# Patient Record
Sex: FEMALE | Race: WHITE | Employment: OTHER | URBAN - NONMETROPOLITAN AREA
[De-identification: names, ages, dates, MRNs, and addresses within clinical notes are randomized per-mention and may not be internally consistent; named-entity substitution may affect disease eponyms.]

---

## 2017-06-29 ENCOUNTER — HOSPITAL ENCOUNTER (OUTPATIENT)
Dept: WOMENS IMAGING | Age: 62
Discharge: HOME | End: 2017-06-29
Payer: COMMERCIAL

## 2017-06-29 DIAGNOSIS — Z12.31 SCREENING MAMMOGRAM, ENCOUNTER FOR: ICD-10-CM

## 2017-06-29 PROCEDURE — 77063 BREAST TOMOSYNTHESIS BI: CPT

## 2017-07-25 LAB
ANION GAP SERPL CALCULATED.3IONS-SCNC: 15 MMOL/L (ref 7–19)
BUN BLDV-MCNC: 22 MG/DL (ref 8–23)
CALCIUM SERPL-MCNC: 9.9 MG/DL (ref 8.8–10.2)
CHLORIDE BLD-SCNC: 100 MMOL/L (ref 98–111)
CO2: 28 MMOL/L (ref 22–29)
CREAT SERPL-MCNC: 0.7 MG/DL (ref 0.5–0.9)
GFR NON-AFRICAN AMERICAN: >60
GLUCOSE BLD-MCNC: 84 MG/DL (ref 74–109)
POTASSIUM SERPL-SCNC: 4.2 MMOL/L (ref 3.5–5)
SODIUM BLD-SCNC: 143 MMOL/L (ref 136–145)

## 2017-07-26 LAB
HAV IGM SER IA-ACNC: NORMAL
HEPATITIS B CORE IGM ANTIBODY: NORMAL
HEPATITIS B SURFACE ANTIGEN INTERPRETATION: NORMAL
HEPATITIS C ANTIBODY INTERPRETATION: NORMAL

## 2017-09-12 ENCOUNTER — OFFICE VISIT (OUTPATIENT)
Dept: FAMILY MEDICINE CLINIC | Age: 62
End: 2017-09-12
Payer: COMMERCIAL

## 2017-09-12 VITALS
OXYGEN SATURATION: 99 % | DIASTOLIC BLOOD PRESSURE: 62 MMHG | BODY MASS INDEX: 28.56 KG/M2 | WEIGHT: 182 LBS | HEART RATE: 82 BPM | SYSTOLIC BLOOD PRESSURE: 100 MMHG | HEIGHT: 67 IN | RESPIRATION RATE: 18 BRPM

## 2017-09-12 DIAGNOSIS — E78.01 FAMILIAL HYPERCHOLESTEROLEMIA: ICD-10-CM

## 2017-09-12 DIAGNOSIS — E55.9 VITAMIN D DEFICIENCY: Primary | ICD-10-CM

## 2017-09-12 DIAGNOSIS — F41.8 DEPRESSION WITH ANXIETY: ICD-10-CM

## 2017-09-12 DIAGNOSIS — I10 ESSENTIAL (PRIMARY) HYPERTENSION: ICD-10-CM

## 2017-09-12 PROCEDURE — 99213 OFFICE O/P EST LOW 20 MIN: CPT | Performed by: FAMILY MEDICINE

## 2017-09-12 RX ORDER — MONTELUKAST SODIUM 10 MG/1
10 TABLET ORAL NIGHTLY
Qty: 90 TABLET | Refills: 2 | Status: SHIPPED | OUTPATIENT
Start: 2017-09-12 | End: 2018-09-18 | Stop reason: SDUPTHER

## 2017-09-12 RX ORDER — LISINOPRIL 10 MG/1
10 TABLET ORAL DAILY
Qty: 90 TABLET | Refills: 2 | Status: SHIPPED | OUTPATIENT
Start: 2017-09-12 | End: 2018-07-05 | Stop reason: SDUPTHER

## 2017-09-12 RX ORDER — VENLAFAXINE HYDROCHLORIDE 225 MG/1
225 TABLET, EXTENDED RELEASE ORAL
Qty: 90 TABLET | Refills: 2 | Status: SHIPPED | OUTPATIENT
Start: 2017-09-12 | End: 2018-06-07 | Stop reason: SDUPTHER

## 2017-09-12 ASSESSMENT — PATIENT HEALTH QUESTIONNAIRE - PHQ9
SUM OF ALL RESPONSES TO PHQ QUESTIONS 1-9: 2
SUM OF ALL RESPONSES TO PHQ9 QUESTIONS 1 & 2: 2
2. FEELING DOWN, DEPRESSED OR HOPELESS: 1
1. LITTLE INTEREST OR PLEASURE IN DOING THINGS: 1

## 2017-09-17 PROBLEM — I10 ESSENTIAL (PRIMARY) HYPERTENSION: Status: ACTIVE | Noted: 2017-09-17

## 2017-09-17 PROBLEM — M54.32 BILATERAL SCIATICA: Status: ACTIVE | Noted: 2017-09-17

## 2017-09-17 PROBLEM — F41.8 DEPRESSION WITH ANXIETY: Status: ACTIVE | Noted: 2017-09-17

## 2017-09-17 PROBLEM — M54.31 BILATERAL SCIATICA: Status: ACTIVE | Noted: 2017-09-17

## 2017-09-17 PROBLEM — F41.9 ANXIETY: Status: ACTIVE | Noted: 2017-09-17

## 2017-09-17 PROBLEM — J30.9 ALLERGIC RHINITIS: Status: ACTIVE | Noted: 2017-09-17

## 2017-09-17 PROBLEM — M19.90 ARTHRITIS: Status: ACTIVE | Noted: 2017-09-17

## 2017-09-17 ASSESSMENT — ENCOUNTER SYMPTOMS
CHEST TIGHTNESS: 0
BLURRED VISION: 0
NAUSEA: 0
CONSTIPATION: 0
DIARRHEA: 0
COUGH: 0
ANAL BLEEDING: 0
ABDOMINAL PAIN: 0
SHORTNESS OF BREATH: 0

## 2017-10-04 ENCOUNTER — NURSE ONLY (OUTPATIENT)
Dept: FAMILY MEDICINE CLINIC | Age: 62
End: 2017-10-04
Payer: COMMERCIAL

## 2017-10-04 DIAGNOSIS — Z23 NEED FOR INFLUENZA VACCINATION: Primary | ICD-10-CM

## 2017-10-05 PROCEDURE — 90688 IIV4 VACCINE SPLT 0.5 ML IM: CPT | Performed by: FAMILY MEDICINE

## 2017-10-05 PROCEDURE — 90471 IMMUNIZATION ADMIN: CPT | Performed by: FAMILY MEDICINE

## 2017-11-07 ENCOUNTER — OFFICE VISIT (OUTPATIENT)
Dept: NEUROLOGY | Age: 62
End: 2017-11-07
Payer: COMMERCIAL

## 2017-11-07 VITALS
HEART RATE: 85 BPM | BODY MASS INDEX: 27.72 KG/M2 | WEIGHT: 176.6 LBS | HEIGHT: 67 IN | SYSTOLIC BLOOD PRESSURE: 105 MMHG | DIASTOLIC BLOOD PRESSURE: 66 MMHG

## 2017-11-07 DIAGNOSIS — M54.32 SCIATICA OF LEFT SIDE: ICD-10-CM

## 2017-11-07 DIAGNOSIS — Z99.89 CPAP (CONTINUOUS POSITIVE AIRWAY PRESSURE) DEPENDENCE: ICD-10-CM

## 2017-11-07 DIAGNOSIS — G47.33 OBSTRUCTIVE SLEEP APNEA: Primary | ICD-10-CM

## 2017-11-07 PROCEDURE — G8419 CALC BMI OUT NRM PARAM NOF/U: HCPCS | Performed by: PHYSICIAN ASSISTANT

## 2017-11-07 PROCEDURE — 99214 OFFICE O/P EST MOD 30 MIN: CPT | Performed by: PHYSICIAN ASSISTANT

## 2017-11-07 PROCEDURE — 1036F TOBACCO NON-USER: CPT | Performed by: PHYSICIAN ASSISTANT

## 2017-11-07 PROCEDURE — 3014F SCREEN MAMMO DOC REV: CPT | Performed by: PHYSICIAN ASSISTANT

## 2017-11-07 PROCEDURE — G8484 FLU IMMUNIZE NO ADMIN: HCPCS | Performed by: PHYSICIAN ASSISTANT

## 2017-11-07 PROCEDURE — G8427 DOCREV CUR MEDS BY ELIG CLIN: HCPCS | Performed by: PHYSICIAN ASSISTANT

## 2017-11-07 PROCEDURE — 3017F COLORECTAL CA SCREEN DOC REV: CPT | Performed by: PHYSICIAN ASSISTANT

## 2017-11-07 NOTE — PATIENT INSTRUCTIONS
 Yes. Here are some things that might help:  ? Stay off your back when sleeping. (This is not always practical, because people cannot control their position while asleep. Plus, it only helps some people.)  ? Lose weight, if you are overweight  ? Avoid alcohol, because it can make sleep apnea worse    How is sleep apnea treated?  As mentioned above, weight loss can help if you are overweight or obese. But losing weight can be challenging, and it takes time to lose enough weight to help with your sleep apnea. Most people need other treatment while they work on losing weight. The most effective treatment for sleep apnea is a device that keeps your airway open while you sleep. Treatment with this device is called \"continuous positive airway pressure,\" or CPAP. People getting CPAP wear a face mask at night that keeps them breathing. If your doctor or nurse recommends a CPAP machine, try to be patient about using it. The mask might seem uncomfortable to wear at first, and the machine might seem noisy, but using the machine can really pay off. People with sleep apnea who use a CPAP machine feel more rested and generally feel better. There is also another device that you wear in your mouth called an \"oral appliance\" or \"mandibular advancement device. \" It also helps keep your airway open while you sleep. This device is only recommended for mild cases of sleep apnea. It may not be covered by your insurance. In rare cases, when nothing else helps, doctors recommend surgery to keep the airway open. Surgery to do this is not always effective, and even when it is, the problem can come back. Is sleep apnea dangerous?  It can be. People with sleep apnea do not get good-quality sleep, so they are often tired and not alert. This puts them at risk for car accidents and other types of accidents.  Plus, studies show that people with sleep apnea are more likely than others to have high blood pressure, heart attacks, and other serious heart problems. In people with severe sleep apnea, getting treated (for example, with a CPAP machine) can help prevent some of these problems. Important information:  Medicare/private insurance CPAP/BiPAP/APAP requirements:  Medicare/private insurance has specific requirements for PAP compliance that must be met during the first 90 days of use to continue coverage for CPAP/BiPAP/APAP  from day 91 and beyond. The policy requires that patients use a PAP device 4 hours per 24 hour period, at least 70% of the time over a 30 day period. This data must be downloaded as a report direct from the PAP devices. This is called a compliance download. Your PAP supplier will assist you in this matter. Reminder:  Please bring a copy of the compliance download to your next office visit or have your supplier fax it to our office prior to your office visit. Note:  Where applicable, we will utilize PAP device efficiency reports, additional testing, and face-to-face  clinical evaluation subsequent to any treatment, changes in treatment, and continued treatment. Caution:  Please abstain from driving or engaging in other activities which may be hazardous in the presence of diminished alertness or daytime drowsiness. And avoid the use of sedatives or alcohol, which can worsen sleep apnea and daytime drowsiness. Mask suggestions:  - [employer]med Airfit N20 (Nasal) or F20 (Full face mask). They conform to your face, thus decreasing the potential for mask leakage. You might like the [employer] (full face mask). It has a \"memory foam\" like cushion. You might also like to try a nasal mask called a Dreamwear nasal mask or the Dreamwear nasal pillow. One other suggestion is the [employer], it is a minimal contact full face mask.   The James Stanley incredible under the nose design makes it the only full face mask that won't cause red marks on the bridge of your nose when compared to other Full Face Masks        Education:  1. American Sleep Association ([place]. org)  2. [employer] sMedio  3. [employer]. com  Patient Education        Sciatica: Care Instructions  Your Care Instructions    Sciatica (say \"fgd-MN-us-kuh\") is an irritation of one of the sciatic nerves, which come from the spinal cord in the lower back. The sciatic nerves and their branches extend down through the buttock to the foot. Sciatica can develop when an injured disc in the back presses against a spinal nerve root. Its main symptom is pain, numbness, or weakness that is often worse in the leg or foot than in the back. Sciatica often will improve and go away with time. Early treatment usually includes medicines and exercises to relieve pain. Follow-up care is a key part of your treatment and safety. Be sure to make and go to all appointments, and call your doctor if you are having problems. It's also a good idea to know your test results and keep a list of the medicines you take. How can you care for yourself at home? · Take pain medicines exactly as directed. ¨ If the doctor gave you a prescription medicine for pain, take it as prescribed. ¨ If you are not taking a prescription pain medicine, ask your doctor if you can take an over-the-counter medicine. · Use heat or ice to relieve pain. ¨ To apply heat, put a warm water bottle, heating pad set on low, or warm cloth on your back. Do not go to sleep with a heating pad on your skin. ¨ To use ice, put ice or a cold pack on the area for 10 to 20 minutes at a time. Put a thin cloth between the ice and your skin. · Avoid sitting if possible, unless it feels better than standing. · Alternate lying down with short walks. Increase your walking distance as you are able to without making your symptoms worse. · Do not do anything that makes your symptoms worse. When should you call for help? Call 911 anytime you think you may need emergency care.  For example, call if:  · You are unable to move a leg at all. Call your doctor now or seek immediate medical care if:  · You have new or worse symptoms in your legs or buttocks. Symptoms may include:  ¨ Numbness or tingling. ¨ Weakness. ¨ Pain. · You lose bladder or bowel control. Watch closely for changes in your health, and be sure to contact your doctor if:  · You are not getting better as expected. Where can you learn more? Go to https://[email or web address].[employer]. org and sign in to your [employer] account. Enter [phone] in the [employer].in box to learn more about \"Sciatica: Care Instructions. \"     If you do not have an account, please click on the \"Sign Up Now\" link. Current as of: March 21, 2017  Content Version: 11.3  © 5579-4195 [employer]. Care instructions adapted under license by [facility] ([facility]). If you have questions about a medical condition or this instruction, always ask your healthcare professional. [place] [address] any warranty or liability for your use of this information.

## 2018-03-05 DIAGNOSIS — E78.01 FAMILIAL HYPERCHOLESTEROLEMIA: ICD-10-CM

## 2018-03-05 DIAGNOSIS — E55.9 VITAMIN D DEFICIENCY: ICD-10-CM

## 2018-03-05 LAB
ALBUMIN SERPL-MCNC: 4.3 G/DL (ref 3.5–5.2)
ALP BLD-CCNC: 66 U/L (ref 35–104)
ALT SERPL-CCNC: 18 U/L (ref 5–33)
ANION GAP SERPL CALCULATED.3IONS-SCNC: 7 MMOL/L (ref 7–19)
AST SERPL-CCNC: 21 U/L (ref 5–32)
BILIRUB SERPL-MCNC: 0.4 MG/DL (ref 0.2–1.2)
BUN BLDV-MCNC: 22 MG/DL (ref 8–23)
CALCIUM SERPL-MCNC: 9.7 MG/DL (ref 8.8–10.2)
CHLORIDE BLD-SCNC: 104 MMOL/L (ref 98–111)
CHOLESTEROL, TOTAL: 164 MG/DL (ref 160–199)
CO2: 32 MMOL/L (ref 22–29)
CREAT SERPL-MCNC: 0.7 MG/DL (ref 0.5–0.9)
GFR NON-AFRICAN AMERICAN: >60
GLUCOSE BLD-MCNC: 95 MG/DL (ref 74–109)
HDLC SERPL-MCNC: 65 MG/DL (ref 65–121)
LDL CHOLESTEROL CALCULATED: 84 MG/DL
POTASSIUM SERPL-SCNC: 3.9 MMOL/L (ref 3.5–5)
SODIUM BLD-SCNC: 143 MMOL/L (ref 136–145)
TOTAL PROTEIN: 6.9 G/DL (ref 6.6–8.7)
TRIGL SERPL-MCNC: 74 MG/DL (ref 0–149)

## 2018-03-08 LAB
VITAMIN D2 AND D3, TOTAL: 47.3 NG/ML (ref 30–80)
VITAMIN D2, 25 HYDROXY: <1 NG/ML
VITAMIN D3,25 HYDROXY: 47.3 NG/ML

## 2018-03-12 ENCOUNTER — OFFICE VISIT (OUTPATIENT)
Dept: FAMILY MEDICINE CLINIC | Age: 63
End: 2018-03-12
Payer: MEDICARE

## 2018-03-12 VITALS
OXYGEN SATURATION: 99 % | WEIGHT: 166 LBS | SYSTOLIC BLOOD PRESSURE: 114 MMHG | DIASTOLIC BLOOD PRESSURE: 62 MMHG | HEART RATE: 82 BPM | HEIGHT: 67 IN | TEMPERATURE: 97.9 F | BODY MASS INDEX: 26.06 KG/M2

## 2018-03-12 DIAGNOSIS — I10 ESSENTIAL HYPERTENSION: ICD-10-CM

## 2018-03-12 DIAGNOSIS — Z13.820 ENCOUNTER FOR SCREENING FOR OSTEOPOROSIS: ICD-10-CM

## 2018-03-12 DIAGNOSIS — E78.2 MIXED HYPERLIPIDEMIA: ICD-10-CM

## 2018-03-12 DIAGNOSIS — Z12.11 SCREENING FOR COLON CANCER: ICD-10-CM

## 2018-03-12 DIAGNOSIS — Z78.0 MENOPAUSE: ICD-10-CM

## 2018-03-12 DIAGNOSIS — Z12.31 ENCOUNTER FOR SCREENING MAMMOGRAM FOR BREAST CANCER: Primary | ICD-10-CM

## 2018-03-12 PROCEDURE — G0402 INITIAL PREVENTIVE EXAM: HCPCS | Performed by: FAMILY MEDICINE

## 2018-03-12 PROCEDURE — G0009 ADMIN PNEUMOCOCCAL VACCINE: HCPCS | Performed by: FAMILY MEDICINE

## 2018-03-12 PROCEDURE — 99213 OFFICE O/P EST LOW 20 MIN: CPT | Performed by: FAMILY MEDICINE

## 2018-03-12 PROCEDURE — 90670 PCV13 VACCINE IM: CPT | Performed by: FAMILY MEDICINE

## 2018-03-12 ASSESSMENT — LIFESTYLE VARIABLES: HOW OFTEN DO YOU HAVE A DRINK CONTAINING ALCOHOL: 0

## 2018-03-12 ASSESSMENT — PATIENT HEALTH QUESTIONNAIRE - PHQ9: SUM OF ALL RESPONSES TO PHQ QUESTIONS 1-9: 2

## 2018-03-12 NOTE — PROGRESS NOTES
Medicare Annual Wellness Visit - Welcome to Medicare    Name: Gunner Trivedi Date: 3/12/2018   MRN: [id number] Sex: Female   Age: 70 y.o. Ethnicity: Non-/Non    : 1955 Race: Cole Yancey is here for   Chief Complaint   Patient presents with    Welcome To Medicare Visit        Screenings for behavioral, psychosocial and functional/safety risks, and cognitive dysfunction are all negative except as indicated below. These results, as well as other patient data from the [address] form, are documented in Flowsheets linked to this Encounter. Cscope due. Prevnar due today. Mammogram due in . Other immunizations UTD. Pap by Dr. John Marinelli. DEXA due. Allergies   Allergen Reactions    Latex        Prior to Visit Medications    Medication Sig Taking? Authorizing Provider   lisinopril (PRINIVIL;ZESTRIL) 10 MG tablet Take 1 tablet by mouth daily Yes Kristine Wilcox MD   montelukast (SINGULAIR) 10 MG tablet Take 1 tablet by mouth nightly Yes Kristine Wilcox MD   venlafaxine 225 MG extended release tablet Take 1 tablet by mouth daily (with breakfast) Yes Kristine Wilcox MD   fexofenadine [facility] ALLERGY) 180 MG tablet Take 180 mg by mouth daily Yes Historical Provider, MD   Misc Natural Products (LUTEIN 20) CAPS Take 20 mg by mouth daily Yes Historical Provider, MD   Calcium-Vitamin D (CALTRATE 600 PLUS-VIT D PO) Take  by mouth 2 times daily. Yes Historical Provider, MD   therapeutic multivitamin-minerals (THERAGRAN-M) tablet Take 1 tablet by mouth daily.  Yes Historical Provider, MD         Past Medical History:   Diagnosis Date    Anxiety     Asymptomatic postmenopausal status (age-related) (natural)     CPAP (continuous positive airway pressure) dependence     11cm    Depression     Headaches due to old head injury     Hyperlipidemia     Hypertension     Joint pain     knee and shoulder    Macular degeneration disease     beginning stage    Neoplasm of uncertain behavior of skin     Obstructive sleep apnea     AHI:  44.5 per PSG, 2/2009    Ringing in ears     Seasonal allergies     Unspecified sleep apnea     uses c-pap    Vitamin D deficiency          Past Surgical History:   Procedure Laterality Date    COLONOSCOPY  10 years    [place]-normal 10 yr recall    DILATION AND CURETTAGE OF UTERUS  2007       Family History   Problem Relation Age of Onset    Stroke Father     Hypertension Father     Other Father      tumor removal, [facility] Stroke Mother     Hypertension Mother    Josh Shillings Mother     Other Maternal Grandmother      hodgkins    Other Maternal Grandfather      leukemia    Cancer Paternal Grandmother 80     pancreatic cancer    Heart Disease Other      paternal side of family       CareTeam (Including outside providers/suppliers regularly involved in providing care):   Patient Care Team:  Faizan Mccann MD as PCP - General (Family Medicine)    Wt Readings from Last 3 Encounters:   03/12/18 166 lb (75.3 kg)   11/07/17 176 lb 9.6 oz (80.1 kg)   09/12/17 182 lb (82.6 kg)     Vitals:    03/12/18 1213   BP: 114/62   Pulse: 82   Temp: 97.9 °F (36.6 °C)   SpO2: 99%   Weight: 166 lb (75.3 kg)   Height: 5' 7\" (1.702 m)     Vision   Right eye 20/25  Left eye 20/25  Both eyes 20/20      General Appearance: alert and oriented to person, place and time, well developed and well- nourished, in no acute distress  Skin: warm and dry, no rash or erythema  Head: normocephalic and atraumatic  Eyes: pupils equal, round, and reactive to light, extraocular eye movements intact, conjunctivae normal  ENT: tympanic membrane, external ear and ear canal normal bilaterally, nose without deformity, nasal mucosa and turbinates normal without polyps  Neck: supple and non-tender without mass, no thyromegaly or thyroid nodules, no cervical lymphadenopathy  Pulmonary/Chest: clear to auscultation bilaterally- no wheezes, rales or rhonchi, normal air

## 2018-03-12 NOTE — PROGRESS NOTES
 Smokeless tobacco: Never Used    Alcohol use Yes      Current Outpatient Prescriptions   Medication Sig Dispense Refill    lisinopril (PRINIVIL;ZESTRIL) 10 MG tablet Take 1 tablet by mouth daily 90 tablet 2    montelukast (SINGULAIR) 10 MG tablet Take 1 tablet by mouth nightly 90 tablet 2    venlafaxine 225 MG extended release tablet Take 1 tablet by mouth daily (with breakfast) 90 tablet 2    fexofenadine (ALLEGRA ALLERGY) 180 MG tablet Take 180 mg by mouth daily      Misc Natural Products (LUTEIN 20) CAPS Take 20 mg by mouth daily      Calcium-Vitamin D (CALTRATE 600 PLUS-VIT D PO) Take  by mouth 2 times daily.  therapeutic multivitamin-minerals (THERAGRAN-M) tablet Take 1 tablet by mouth daily. No current facility-administered medications for this visit. Allergies   Allergen Reactions    Latex        Health Maintenance   Topic Date Due    HIV screen  02/15/1968    Cervical cancer screen  02/15/1974    Colon cancer screen colonoscopy  02/15/2003    Shingles Vaccine (1 of 2 - 2 Dose Series) 06/15/2013    DEXA (modify frequency per FRAX score)  02/15/2018    Potassium monitoring  03/05/2019    Creatinine monitoring  03/05/2019    Pneumococcal low/med risk (2 of 2 - PPSV23) 03/12/2019    Breast cancer screen  06/29/2019    Lipid screen  03/05/2023    DTaP/Tdap/Td vaccine (2 - Td) 07/15/2023    Flu vaccine  Completed    Hepatitis C screen  Completed       Subjective:      Review of Systems   Constitutional: Negative for chills and fever. HENT: Negative for congestion. Respiratory: Negative for cough, chest tightness and shortness of breath. Cardiovascular: Negative for chest pain, palpitations and leg swelling. Gastrointestinal: Negative for abdominal pain, anal bleeding, constipation, diarrhea and nausea. Genitourinary: Negative for difficulty urinating. Psychiatric/Behavioral: Negative. See HPI for visit specific review of symptoms.   All others

## 2018-03-16 ASSESSMENT — ENCOUNTER SYMPTOMS
SHORTNESS OF BREATH: 0
NAUSEA: 0
ANAL BLEEDING: 0
CHEST TIGHTNESS: 0
COUGH: 0
DIARRHEA: 0
CONSTIPATION: 0
ABDOMINAL PAIN: 0

## 2018-03-27 ENCOUNTER — TELEPHONE (OUTPATIENT)
Dept: GASTROENTEROLOGY | Age: 63
End: 2018-03-27

## 2018-06-07 RX ORDER — VENLAFAXINE HYDROCHLORIDE 225 MG/1
TABLET, EXTENDED RELEASE ORAL
Qty: 90 TABLET | Refills: 2 | Status: SHIPPED | OUTPATIENT
Start: 2018-06-07 | End: 2019-03-04 | Stop reason: SDUPTHER

## 2018-06-08 ENCOUNTER — OFFICE VISIT (OUTPATIENT)
Dept: URGENT CARE | Age: 63
End: 2018-06-08
Payer: MEDICARE

## 2018-06-08 VITALS
OXYGEN SATURATION: 97 % | SYSTOLIC BLOOD PRESSURE: 117 MMHG | BODY MASS INDEX: 24.74 KG/M2 | HEIGHT: 67 IN | WEIGHT: 157.6 LBS | HEART RATE: 90 BPM | RESPIRATION RATE: 18 BRPM | TEMPERATURE: 98 F | DIASTOLIC BLOOD PRESSURE: 60 MMHG

## 2018-06-08 DIAGNOSIS — H10.9 BACTERIAL CONJUNCTIVITIS OF LEFT EYE: Primary | ICD-10-CM

## 2018-06-08 PROCEDURE — 99213 OFFICE O/P EST LOW 20 MIN: CPT | Performed by: NURSE PRACTITIONER

## 2018-06-08 PROCEDURE — G8400 PT W/DXA NO RESULTS DOC: HCPCS | Performed by: NURSE PRACTITIONER

## 2018-06-08 PROCEDURE — 1123F ACP DISCUSS/DSCN MKR DOCD: CPT | Performed by: NURSE PRACTITIONER

## 2018-06-08 PROCEDURE — G8427 DOCREV CUR MEDS BY ELIG CLIN: HCPCS | Performed by: NURSE PRACTITIONER

## 2018-06-08 PROCEDURE — 1036F TOBACCO NON-USER: CPT | Performed by: NURSE PRACTITIONER

## 2018-06-08 PROCEDURE — 1090F PRES/ABSN URINE INCON ASSESS: CPT | Performed by: NURSE PRACTITIONER

## 2018-06-08 PROCEDURE — 3017F COLORECTAL CA SCREEN DOC REV: CPT | Performed by: NURSE PRACTITIONER

## 2018-06-08 PROCEDURE — G8420 CALC BMI NORM PARAMETERS: HCPCS | Performed by: NURSE PRACTITIONER

## 2018-06-08 PROCEDURE — 4040F PNEUMOC VAC/ADMIN/RCVD: CPT | Performed by: NURSE PRACTITIONER

## 2018-06-08 RX ORDER — CIPROFLOXACIN HYDROCHLORIDE 3.5 MG/ML
1 SOLUTION/ DROPS TOPICAL 3 TIMES DAILY
Qty: 1 BOTTLE | Refills: 0 | Status: SHIPPED | OUTPATIENT
Start: 2018-06-08 | End: 2018-06-15

## 2018-06-08 ASSESSMENT — ENCOUNTER SYMPTOMS
PHOTOPHOBIA: 1
EYE PAIN: 1
SINUS PRESSURE: 1
EYE ITCHING: 1
RHINORRHEA: 1
EYE REDNESS: 1
EYE DISCHARGE: 1

## 2018-07-02 ENCOUNTER — HOSPITAL ENCOUNTER (OUTPATIENT)
Dept: WOMENS IMAGING | Age: 63
Discharge: HOME | End: 2018-07-02
Payer: MEDICARE

## 2018-07-02 DIAGNOSIS — Z13.820 ENCOUNTER FOR SCREENING FOR OSTEOPOROSIS: ICD-10-CM

## 2018-07-02 DIAGNOSIS — Z12.31 ENCOUNTER FOR SCREENING MAMMOGRAM FOR BREAST CANCER: ICD-10-CM

## 2018-07-02 DIAGNOSIS — Z78.0 MENOPAUSE: ICD-10-CM

## 2018-07-02 LAB — HEREDITARY CANCER TEST-MYRIAD: NORMAL

## 2018-07-02 PROCEDURE — 77063 BREAST TOMOSYNTHESIS BI: CPT

## 2018-07-02 PROCEDURE — 77080 DXA BONE DENSITY AXIAL: CPT

## 2018-07-02 PROCEDURE — 36415 COLL VENOUS BLD VENIPUNCTURE: CPT

## 2018-07-05 RX ORDER — LISINOPRIL 10 MG/1
TABLET ORAL
Qty: 90 TABLET | Refills: 2 | Status: SHIPPED | OUTPATIENT
Start: 2018-07-05 | End: 2019-03-18 | Stop reason: SDUPTHER

## 2018-08-15 ENCOUNTER — ANESTHESIA EVENT (OUTPATIENT)
Dept: ENDOSCOPY | Age: 63
End: 2018-08-15
Payer: MEDICARE

## 2018-08-16 ENCOUNTER — ANESTHESIA (OUTPATIENT)
Dept: ENDOSCOPY | Age: 63
End: 2018-08-16
Payer: MEDICARE

## 2018-08-16 ENCOUNTER — HOSPITAL ENCOUNTER (OUTPATIENT)
Age: 63
Setting detail: OUTPATIENT SURGERY
Discharge: HOME | End: 2018-08-16
Attending: INTERNAL MEDICINE | Admitting: INTERNAL MEDICINE
Payer: MEDICARE

## 2018-08-16 ENCOUNTER — TELEPHONE (OUTPATIENT)
Dept: GASTROENTEROLOGY | Age: 63
End: 2018-08-16

## 2018-08-16 VITALS
DIASTOLIC BLOOD PRESSURE: 55 MMHG | RESPIRATION RATE: 13 BRPM | OXYGEN SATURATION: 100 % | SYSTOLIC BLOOD PRESSURE: 101 MMHG

## 2018-08-16 VITALS
RESPIRATION RATE: 18 BRPM | HEIGHT: 67 IN | TEMPERATURE: 98 F | WEIGHT: 158 LBS | SYSTOLIC BLOOD PRESSURE: 107 MMHG | DIASTOLIC BLOOD PRESSURE: 66 MMHG | OXYGEN SATURATION: 100 % | BODY MASS INDEX: 24.8 KG/M2 | HEART RATE: 78 BPM

## 2018-08-16 PROBLEM — Z86.010 HISTORY OF COLON POLYPS: Status: ACTIVE | Noted: 2018-08-16

## 2018-08-16 PROBLEM — Z86.0100 HISTORY OF COLON POLYPS: Status: ACTIVE | Noted: 2018-08-16

## 2018-08-16 PROCEDURE — 7100000011 HC PHASE II RECOVERY - ADDTL 15 MIN: Performed by: INTERNAL MEDICINE

## 2018-08-16 PROCEDURE — 45378 DIAGNOSTIC COLONOSCOPY: CPT | Performed by: INTERNAL MEDICINE

## 2018-08-16 PROCEDURE — 2500000003 HC RX 250 WO HCPCS: Performed by: NURSE ANESTHETIST, CERTIFIED REGISTERED

## 2018-08-16 PROCEDURE — 7100000010 HC PHASE II RECOVERY - FIRST 15 MIN: Performed by: INTERNAL MEDICINE

## 2018-08-16 PROCEDURE — 3609027000 HC COLONOSCOPY: Performed by: INTERNAL MEDICINE

## 2018-08-16 PROCEDURE — 2580000003 HC RX 258: Performed by: INTERNAL MEDICINE

## 2018-08-16 PROCEDURE — 6360000002 HC RX W HCPCS: Performed by: NURSE ANESTHETIST, CERTIFIED REGISTERED

## 2018-08-16 PROCEDURE — 3700000000 HC ANESTHESIA ATTENDED CARE: Performed by: INTERNAL MEDICINE

## 2018-08-16 PROCEDURE — 3700000001 HC ADD 15 MINUTES (ANESTHESIA): Performed by: INTERNAL MEDICINE

## 2018-08-16 RX ORDER — SODIUM CHLORIDE, SODIUM LACTATE, POTASSIUM CHLORIDE, CALCIUM CHLORIDE 600; 310; 30; 20 MG/100ML; MG/100ML; MG/100ML; MG/100ML
INJECTION, SOLUTION INTRAVENOUS CONTINUOUS
Status: DISCONTINUED | OUTPATIENT
Start: 2018-08-16 | End: 2018-08-16 | Stop reason: HOSPADM

## 2018-08-16 RX ORDER — PROPOFOL 10 MG/ML
INJECTION, EMULSION INTRAVENOUS PRN
Status: DISCONTINUED | OUTPATIENT
Start: 2018-08-16 | End: 2018-08-16 | Stop reason: SDUPTHER

## 2018-08-16 RX ORDER — LIDOCAINE HYDROCHLORIDE 20 MG/ML
INJECTION, SOLUTION INFILTRATION; PERINEURAL PRN
Status: DISCONTINUED | OUTPATIENT
Start: 2018-08-16 | End: 2018-08-16 | Stop reason: SDUPTHER

## 2018-08-16 RX ORDER — LIDOCAINE HYDROCHLORIDE 10 MG/ML
1 INJECTION, SOLUTION EPIDURAL; INFILTRATION; INTRACAUDAL; PERINEURAL ONCE
Status: DISCONTINUED | OUTPATIENT
Start: 2018-08-16 | End: 2018-08-16 | Stop reason: HOSPADM

## 2018-08-16 RX ADMIN — LIDOCAINE HYDROCHLORIDE 40 MG: 20 INJECTION, SOLUTION INFILTRATION; PERINEURAL at 09:05

## 2018-08-16 RX ADMIN — SODIUM CHLORIDE, POTASSIUM CHLORIDE, SODIUM LACTATE AND CALCIUM CHLORIDE: 600; 310; 30; 20 INJECTION, SOLUTION INTRAVENOUS at 08:32

## 2018-08-16 RX ADMIN — PROPOFOL 130 MG: 10 INJECTION, EMULSION INTRAVENOUS at 09:05

## 2018-08-16 ASSESSMENT — PAIN - FUNCTIONAL ASSESSMENT: PAIN_FUNCTIONAL_ASSESSMENT: 0-10

## 2018-08-16 NOTE — ANESTHESIA PRE PROCEDURE
PLT    CMP:   Lab Results   Component Value Date     03/05/2018    K 3.9 03/05/2018     03/05/2018    CO2 32 03/05/2018    BUN 22 03/05/2018    CREATININE 0.7 03/05/2018    LABGLOM >60 03/05/2018    GLUCOSE 95 03/05/2018    PROT 6.9 03/05/2018    CALCIUM 9.7 03/05/2018    BILITOT 0.4 03/05/2018    ALKPHOS 66 03/05/2018    AST 21 03/05/2018    ALT 18 03/05/2018       POC Tests: No results for input(s): POCGLU, POCNA, POCK, POCCL, POCBUN, POCHEMO, POCHCT in the last 72 hours. Coags: No results found for: PROTIME, INR, APTT    HCG (If Applicable): No results found for: PREGTESTUR, PREGSERUM, HCG, HCGQUANT     ABGs: No results found for: PHART, PO2ART, DWZ9RJL, QMQ9RYI, BEART, I3XSAZRT     Type & Screen (If Applicable):  No results found for: [facility]    Anesthesia Evaluation  Patient summary reviewed and Nursing notes reviewed no history of anesthetic complications:   Airway: Mallampati: II  TM distance: >3 FB   Neck ROM: full  Mouth opening: < 3 FB Dental: normal exam         Pulmonary:normal exam    (+) sleep apnea: on CPAP,                             Cardiovascular:    (+) hypertension:, hyperlipidemia         Beta Blocker:  Not on Beta Blocker         Neuro/Psych:   (+) headaches:, psychiatric history:depression/anxiety             GI/Hepatic/Renal:   (+) bowel prep,           Endo/Other: Negative Endo/Other ROS                    Abdominal:           Vascular:                                        Anesthesia Plan      general and TIVA     ASA 2       Induction: intravenous. Anesthetic plan and risks discussed with patient.                       HE Culp - CRNA   8/16/2018

## 2018-08-16 NOTE — H&P
Problems    Diagnosis Date Noted    History of colon polyps [Z86.010] 08/16/2018        All other pertinent GI symptoms negative. Physical Exam:  Cardiac:  [x]WNL  []Comments:  Pulmonary:  [x]WNL   []Comments:  Neuro/Mental Status:  [x]WNL  []Comments:  Abdominal:  [x]WNL    []Comments:  Other:   []WNL  []Comments:    Informed Consent:  The risks and benefits of the procedure have been discussed with either the patient or if they cannot consent, their representative. Assessment:  Patient examined and appropriate for planned sedation and procedure. Plan:  Proceed with planned sedation and procedure as above.     Anthony Omalley, DO  9:00 AM

## 2018-08-17 ENCOUNTER — ANESTHESIA (OUTPATIENT)
Dept: OPERATING ROOM | Age: 63
End: 2018-08-17

## 2018-08-17 ENCOUNTER — HOSPITAL ENCOUNTER (OUTPATIENT)
Age: 63
Setting detail: OUTPATIENT SURGERY
Discharge: HOME | End: 2018-08-17
Attending: INTERNAL MEDICINE | Admitting: INTERNAL MEDICINE
Payer: MEDICARE

## 2018-08-17 ENCOUNTER — ANESTHESIA EVENT (OUTPATIENT)
Dept: OPERATING ROOM | Age: 63
End: 2018-08-17

## 2018-08-17 VITALS
DIASTOLIC BLOOD PRESSURE: 65 MMHG | WEIGHT: 158 LBS | SYSTOLIC BLOOD PRESSURE: 102 MMHG | BODY MASS INDEX: 24.8 KG/M2 | OXYGEN SATURATION: 100 % | HEART RATE: 91 BPM | RESPIRATION RATE: 18 BRPM | HEIGHT: 67 IN

## 2018-08-17 VITALS — SYSTOLIC BLOOD PRESSURE: 80 MMHG | DIASTOLIC BLOOD PRESSURE: 47 MMHG | OXYGEN SATURATION: 99 %

## 2018-08-17 PROCEDURE — G8907 PT DOC NO EVENTS ON DISCHARG: HCPCS

## 2018-08-17 PROCEDURE — G8918 PT W/O PREOP ORDER IV AB PRO: HCPCS

## 2018-08-17 PROCEDURE — G0105 COLORECTAL SCRN; HI RISK IND: HCPCS

## 2018-08-17 PROCEDURE — G0105 COLORECTAL SCRN; HI RISK IND: HCPCS | Performed by: INTERNAL MEDICINE

## 2018-08-17 RX ORDER — PROPOFOL 10 MG/ML
INJECTION, EMULSION INTRAVENOUS PRN
Status: DISCONTINUED | OUTPATIENT
Start: 2018-08-17 | End: 2018-08-17 | Stop reason: SDUPTHER

## 2018-08-17 RX ORDER — LIDOCAINE HYDROCHLORIDE 10 MG/ML
INJECTION, SOLUTION EPIDURAL; INFILTRATION; INTRACAUDAL; PERINEURAL PRN
Status: DISCONTINUED | OUTPATIENT
Start: 2018-08-17 | End: 2018-08-17 | Stop reason: SDUPTHER

## 2018-08-17 RX ORDER — SODIUM CHLORIDE 9 MG/ML
INJECTION, SOLUTION INTRAVENOUS CONTINUOUS
Status: DISCONTINUED | OUTPATIENT
Start: 2018-08-17 | End: 2018-08-17 | Stop reason: HOSPADM

## 2018-08-17 RX ADMIN — PROPOFOL 220 MG: 10 INJECTION, EMULSION INTRAVENOUS at 12:41

## 2018-08-17 RX ADMIN — SODIUM CHLORIDE: 9 INJECTION, SOLUTION INTRAVENOUS at 12:11

## 2018-08-17 RX ADMIN — LIDOCAINE HYDROCHLORIDE 30 MG: 10 INJECTION, SOLUTION EPIDURAL; INFILTRATION; INTRACAUDAL; PERINEURAL at 12:41

## 2018-08-17 NOTE — H&P
Patient Name: Paulina Dewitt  : 1955  MRN: [id number]    Allergies: Allergies   Allergen Reactions    Latex          ENDOSCOPY / COLONOSCOPY / BRONCHOSCOPY      PRE-SEDATION ASSESSMENT      Procedure:    [x] Colonoscopy     [] Endoscopy      [] ERCP      [] Bronchoscopy      [] Other  [] History and Physical completed in chart for Inpatient or within 30 daysfrom office. I have examined the patient's status immediately prior to the procedure and:    [x] No interval change in patient status since H&P completed  [] Interval change in patient status (explained below)           BRIEF H&P    HPI/changes/indicators/diagnosis  Active Hospital Problems    Diagnosis Date Noted    History of colon polyps [Z86.010] 2018       Medications:   Prior to Admission medications    Medication Sig Start Date End Date Taking? Authorizing Provider   lisinopril (PRINIVIL;ZESTRIL) 10 MG tablet TAKE ONE TABLET BY MOUTH ONCE DAILY 18   Alvino Dubin, MD   venlafaxine 225 MG extended release tablet TAKE ONE  BY MOUTH ONCE DAILY(WITH BREAKFAST) 18   Alvino Dubin, MD   montelukast (SINGULAIR) 10 MG tablet Take 1 tablet by mouth nightly 17   Alvino Dubin, MD   fexofenadine (ALLEGRA ALLERGY) 180 MG tablet Take 180 mg by mouth daily    Historical Provider, MD   Misc Natural Products (LUTEIN 20) CAPS Take 20 mg by mouth daily    Historical Provider, MD   Calcium-Vitamin D (CALTRATE 600 PLUS-VIT D PO) Take  by mouth 2 times daily. Historical Provider, MD   therapeutic multivitamin-minerals (THERAGRAN-M) tablet Take 1 tablet by mouth daily. Historical Provider, MD       Allergies:   is allergic to latex.       Vital Signs:   Vitals:    18 1207   BP: 115/68   Pulse: 86   Resp: 18   SpO2: 100%       ROS:  Cardiac:  [x]WNL  []Comments:  Pulmonary:  [x]WNL   []Comments:  Neuro/Mental Status:  [x]WNL  []Comments:  Abdominal:                   Active Hospital Problems    Diagnosis Date Noted    History of

## 2018-08-17 NOTE — ANESTHESIA PRE PROCEDURE
Department of Anesthesiology  Preprocedure Note       Name:  Berkley Hernandez   Age:  70 y.o.  :  1955                                          MRN:  [id number]         Date:  2018      Surgeon: Turner Barthel):  Cheryle Heart Hendon, DO    Procedure: Procedure(s):  COLONOSCOPY    Medications prior to admission:   Prior to Admission medications    Medication Sig Start Date End Date Taking? Authorizing Provider   lisinopril (PRINIVIL;ZESTRIL) 10 MG tablet TAKE ONE TABLET BY MOUTH ONCE DAILY 18   Sultana Norton MD   venlafaxine 225 MG extended release tablet TAKE ONE  BY MOUTH ONCE DAILY(WITH BREAKFAST) 18   Sultana Norton MD   montelukast (SINGULAIR) 10 MG tablet Take 1 tablet by mouth nightly 17   Sultana Norton MD   fexofenadine (ALLEGRA ALLERGY) 180 MG tablet Take 180 mg by mouth daily    Historical Provider, MD   Misc Natural Products (LUTEIN 20) CAPS Take 20 mg by mouth daily    Historical Provider, MD   Calcium-Vitamin D (CALTRATE 600 PLUS-VIT D PO) Take  by mouth 2 times daily. Historical Provider, MD   therapeutic multivitamin-minerals (THERAGRAN-M) tablet Take 1 tablet by mouth daily. Historical Provider, MD       Current medications:    No current facility-administered medications for this visit. No current outpatient prescriptions on file. Facility-Administered Medications Ordered in Other Visits   Medication Dose Route Frequency Provider Last Rate Last Dose    0.9 % sodium chloride infusion   Intravenous Continuous Anthony Omalley  mL/hr at 18 1211         Allergies:     Allergies   Allergen Reactions    Latex        Problem List:    Patient Active Problem List   Diagnosis Code    Colon cancer screening Z12.11    HTN (hypertension) I10    Sleep apnea G47.30    Hyperlipidemia E78.5    Obstructive sleep apnea G47.33    CPAP (continuous positive airway pressure) dependence Z99.89    Vitamin D deficiency E55.9    Essential hypertension I10    Sciatica of 32 03/05/2018    BUN 22 03/05/2018    CREATININE 0.7 03/05/2018    LABGLOM >60 03/05/2018    GLUCOSE 95 03/05/2018    PROT 6.9 03/05/2018    CALCIUM 9.7 03/05/2018    BILITOT 0.4 03/05/2018    ALKPHOS 66 03/05/2018    AST 21 03/05/2018    ALT 18 03/05/2018       POC Tests: No results for input(s): POCGLU, POCNA, POCK, POCCL, POCBUN, POCHEMO, POCHCT in the last 72 hours. Coags: No results found for: PROTIME, INR, APTT    HCG (If Applicable): No results found for: PREGTESTUR, PREGSERUM, HCG, HCGQUANT     ABGs: No results found for: PHART, PO2ART, LQQ0ETG, UMO4DUW, BEART, T3YCMSCT     Type & Screen (If Applicable):  No results found for: LABABO, [address]    Anesthesia Evaluation  Patient summary reviewed and Nursing notes reviewed no history of anesthetic complications:   Airway: Mallampati: II  TM distance: >3 FB   Neck ROM: full  Mouth opening: < 3 FB Dental: normal exam         Pulmonary:normal exam    (+) sleep apnea: on CPAP,                             Cardiovascular:    (+) hypertension:, hyperlipidemia         Beta Blocker:  Not on Beta Blocker         Neuro/Psych:   (+) headaches:, psychiatric history:depression/anxiety             GI/Hepatic/Renal:   (+) bowel prep,           Endo/Other: Negative Endo/Other ROS                    Abdominal:           Vascular:                                          Anesthesia Plan      general and TIVA     ASA 2       Induction: intravenous. Anesthetic plan and risks discussed with patient.                       HE Shaw - CRNA   8/17/2018

## 2018-08-17 NOTE — ANESTHESIA POSTPROCEDURE EVALUATION
Department of Anesthesiology  Postprocedure Note    Patient: Gabriele Cole  MRN: [id number]  YOB: 1955  Date of evaluation: 8/17/2018  Time:  1:01 PM     Procedure Summary     Date:  08/17/18 Room / Location:  [facility] ENDO  / [facility] OR    Anesthesia Start:  1238 Anesthesia Stop:      Procedure:  COLONOSCOPY (N/A ) Diagnosis:  (SCREEN - HX CLN POLYPS (REPEAT FROM 8/16/18))    Surgeon:  Marquis Back DO Responsible Provider:  HE Miller CRNA    Anesthesia Type:  general, TIVA ASA Status:  2          Anesthesia Type: general, TIVA    Meghan Phase I:      Meghan Phase II:      Last vitals: Reviewed and per EMR flowsheets.        Anesthesia Post Evaluation    Patient location during evaluation: bedside  Patient participation: complete - patient participated  Level of consciousness: awake  Pain score: 0  Airway patency: patent  Nausea & Vomiting: no vomiting and no nausea  Complications: no  Cardiovascular status: hemodynamically stable  Respiratory status: acceptable  Hydration status: stable

## 2018-09-18 ENCOUNTER — OFFICE VISIT (OUTPATIENT)
Dept: FAMILY MEDICINE CLINIC | Age: 63
End: 2018-09-18
Payer: MEDICARE

## 2018-09-18 VITALS
DIASTOLIC BLOOD PRESSURE: 80 MMHG | SYSTOLIC BLOOD PRESSURE: 120 MMHG | OXYGEN SATURATION: 99 % | WEIGHT: 160 LBS | BODY MASS INDEX: 25.06 KG/M2 | HEART RATE: 83 BPM

## 2018-09-18 DIAGNOSIS — J30.9 ALLERGIC RHINITIS, UNSPECIFIED SEASONALITY, UNSPECIFIED TRIGGER: ICD-10-CM

## 2018-09-18 DIAGNOSIS — Z23 FLU VACCINE NEED: Primary | ICD-10-CM

## 2018-09-18 DIAGNOSIS — M85.80 OSTEOPENIA, UNSPECIFIED LOCATION: ICD-10-CM

## 2018-09-18 DIAGNOSIS — I10 ESSENTIAL (PRIMARY) HYPERTENSION: ICD-10-CM

## 2018-09-18 DIAGNOSIS — E78.2 MIXED HYPERLIPIDEMIA: ICD-10-CM

## 2018-09-18 PROCEDURE — 99213 OFFICE O/P EST LOW 20 MIN: CPT | Performed by: FAMILY MEDICINE

## 2018-09-18 PROCEDURE — G8419 CALC BMI OUT NRM PARAM NOF/U: HCPCS | Performed by: FAMILY MEDICINE

## 2018-09-18 PROCEDURE — G0008 ADMIN INFLUENZA VIRUS VAC: HCPCS | Performed by: FAMILY MEDICINE

## 2018-09-18 PROCEDURE — 90662 IIV NO PRSV INCREASED AG IM: CPT | Performed by: FAMILY MEDICINE

## 2018-09-18 PROCEDURE — 1036F TOBACCO NON-USER: CPT | Performed by: FAMILY MEDICINE

## 2018-09-18 PROCEDURE — 1123F ACP DISCUSS/DSCN MKR DOCD: CPT | Performed by: FAMILY MEDICINE

## 2018-09-18 PROCEDURE — 4040F PNEUMOC VAC/ADMIN/RCVD: CPT | Performed by: FAMILY MEDICINE

## 2018-09-18 PROCEDURE — 3017F COLORECTAL CA SCREEN DOC REV: CPT | Performed by: FAMILY MEDICINE

## 2018-09-18 PROCEDURE — G8399 PT W/DXA RESULTS DOCUMENT: HCPCS | Performed by: FAMILY MEDICINE

## 2018-09-18 PROCEDURE — 1090F PRES/ABSN URINE INCON ASSESS: CPT | Performed by: FAMILY MEDICINE

## 2018-09-18 PROCEDURE — 1101F PT FALLS ASSESS-DOCD LE1/YR: CPT | Performed by: FAMILY MEDICINE

## 2018-09-18 PROCEDURE — G8427 DOCREV CUR MEDS BY ELIG CLIN: HCPCS | Performed by: FAMILY MEDICINE

## 2018-09-18 RX ORDER — MONTELUKAST SODIUM 10 MG/1
10 TABLET ORAL NIGHTLY
Qty: 90 TABLET | Refills: 2 | Status: SHIPPED | OUTPATIENT
Start: 2018-09-18 | End: 2019-03-18 | Stop reason: SDUPTHER

## 2018-09-18 NOTE — PROGRESS NOTES
Age of Onset   [employer] Stroke Father     Hypertension Father     Other Father         tumor removal, lower abd area   [employer] Stroke Mother     Hypertension Mother    Jacki Hinklebert Mother     Other Maternal Grandmother         hodgkins    Other Maternal Grandfather         leukemia    Cancer Paternal Grandmother 80        pancreatic cancer    Heart Disease Paternal Cousin         paternal side of family    Cancer Paternal Cousin         colon       Social History   Substance Use Topics    Smoking status: Never Smoker    Smokeless tobacco: Never Used    Alcohol use No      Current Outpatient Prescriptions   Medication Sig Dispense Refill    montelukast (SINGULAIR) 10 MG tablet Take 1 tablet by mouth nightly 90 tablet 2    lisinopril (PRINIVIL;ZESTRIL) 10 MG tablet TAKE ONE TABLET BY MOUTH ONCE DAILY 90 tablet 2    venlafaxine 225 MG extended release tablet TAKE ONE  BY MOUTH ONCE DAILY(WITH BREAKFAST) 90 tablet 2    fexofenadine (ALLEGRA ALLERGY) 180 MG tablet Take 180 mg by mouth daily      Misc Natural Products (LUTEIN 20) CAPS Take 20 mg by mouth daily      Calcium-Vitamin D (CALTRATE 600 PLUS-VIT D PO) Take  by mouth 2 times daily.  therapeutic multivitamin-minerals (THERAGRAN-M) tablet Take 1 tablet by mouth daily. No current facility-administered medications for this visit.       Allergies   Allergen Reactions    Latex        Health Maintenance   Topic Date Due    Cervical cancer screen  02/15/1974    Shingles Vaccine (1 of 2 - 2 Dose Series) 06/15/2013    HIV screen  09/02/2019 (Originally 2/15/1968)    Potassium monitoring  03/05/2019    Creatinine monitoring  03/05/2019    Pneumococcal low/med risk (2 of 2 - PPSV23) 03/12/2019    Breast cancer screen  07/02/2020    Lipid screen  03/05/2023    DTaP/Tdap/Td vaccine (2 - Td) 07/15/2023    Colon cancer screen colonoscopy  08/17/2023    DEXA (modify frequency per FRAX score)  Completed    Flu vaccine  Completed    Hepatitis C screen

## 2018-09-25 PROBLEM — M85.80 OSTEOPENIA: Status: ACTIVE | Noted: 2018-09-25

## 2018-09-25 ASSESSMENT — ENCOUNTER SYMPTOMS
ANAL BLEEDING: 0
SHORTNESS OF BREATH: 0
COUGH: 0
NAUSEA: 0
ABDOMINAL PAIN: 0
DIARRHEA: 0
CHEST TIGHTNESS: 0
CONSTIPATION: 0

## 2018-11-07 ENCOUNTER — OFFICE VISIT (OUTPATIENT)
Dept: NEUROLOGY | Age: 63
End: 2018-11-07
Payer: MEDICARE

## 2018-11-07 VITALS
SYSTOLIC BLOOD PRESSURE: 112 MMHG | WEIGHT: 168 LBS | OXYGEN SATURATION: 98 % | DIASTOLIC BLOOD PRESSURE: 71 MMHG | HEIGHT: 67 IN | BODY MASS INDEX: 26.37 KG/M2 | HEART RATE: 88 BPM

## 2018-11-07 DIAGNOSIS — Z99.89 CPAP (CONTINUOUS POSITIVE AIRWAY PRESSURE) DEPENDENCE: ICD-10-CM

## 2018-11-07 DIAGNOSIS — G47.33 OBSTRUCTIVE SLEEP APNEA: Primary | ICD-10-CM

## 2018-11-07 PROCEDURE — G8482 FLU IMMUNIZE ORDER/ADMIN: HCPCS | Performed by: PHYSICIAN ASSISTANT

## 2018-11-07 PROCEDURE — 1036F TOBACCO NON-USER: CPT | Performed by: PHYSICIAN ASSISTANT

## 2018-11-07 PROCEDURE — 1123F ACP DISCUSS/DSCN MKR DOCD: CPT | Performed by: PHYSICIAN ASSISTANT

## 2018-11-07 PROCEDURE — G8399 PT W/DXA RESULTS DOCUMENT: HCPCS | Performed by: PHYSICIAN ASSISTANT

## 2018-11-07 PROCEDURE — G8419 CALC BMI OUT NRM PARAM NOF/U: HCPCS | Performed by: PHYSICIAN ASSISTANT

## 2018-11-07 PROCEDURE — 3017F COLORECTAL CA SCREEN DOC REV: CPT | Performed by: PHYSICIAN ASSISTANT

## 2018-11-07 PROCEDURE — 4040F PNEUMOC VAC/ADMIN/RCVD: CPT | Performed by: PHYSICIAN ASSISTANT

## 2018-11-07 PROCEDURE — G8427 DOCREV CUR MEDS BY ELIG CLIN: HCPCS | Performed by: PHYSICIAN ASSISTANT

## 2018-11-07 PROCEDURE — 99213 OFFICE O/P EST LOW 20 MIN: CPT | Performed by: PHYSICIAN ASSISTANT

## 2018-11-07 PROCEDURE — 1101F PT FALLS ASSESS-DOCD LE1/YR: CPT | Performed by: PHYSICIAN ASSISTANT

## 2018-11-07 PROCEDURE — 1090F PRES/ABSN URINE INCON ASSESS: CPT | Performed by: PHYSICIAN ASSISTANT

## 2018-11-07 NOTE — PATIENT INSTRUCTIONS
another sleep test. While the treatment may seem uncomfortable, noisy, or bulky at first, most people accept the treatment after experiencing better sleep. However, difficulty with mask comfort and nasal congestion prevent up to 50 percent of people from using the treatment on a regular basis. Continued follow up with a healthcare provider helps to ensure that the treatment is effective and comfortable. Information from the CPAP machine is often used by physicians, therapists, and insurers to track the success of treatment. CPAP can be delivered with different features to improve comfort and solve problems that may come up during treatment. Changes in treatment may be needed if symptoms do not improve or if the persons condition changes, such as a gain or loss of weight. Adjust sleep position -- Adjusting sleep position (to stay off the back) may help improve sleep quality in people who have PRASAD when sleeping on the back. However, this is difficult to maintain throughout the night and is rarely an adequate solution. Weight loss -- Weight loss may be helpful for obese or overweight patients. Weight loss may be accomplished with dietary changes, exercise, and/or surgical treatment. However, it can be difficult to maintain weight loss; the five-year success of non-surgical weight loss is only 5 percent, meaning that 95 percent of people regain lost weight. Avoid alcohol and other sedatives -- Alcohol can worsen sleepiness, potentially increasing the risk of accidents or injury. People with PRASAD are often counseled to drink little to no alcohol, even during the daytime. Similarly, people who take anti-anxiety medications or sedatives to sleep should speak with their healthcare provider about the safety of these medications. People with PRASAD must notify all healthcare providers, including surgeons, about their condition and the potential risks of being sedated.  People with PRASAD who are given anesthesia and/or pain

## 2019-03-04 RX ORDER — VENLAFAXINE HYDROCHLORIDE 225 MG/1
TABLET, EXTENDED RELEASE ORAL
Qty: 90 TABLET | Refills: 2 | Status: SHIPPED | OUTPATIENT
Start: 2019-03-04 | End: 2019-12-04 | Stop reason: SDUPTHER

## 2019-03-18 ENCOUNTER — OFFICE VISIT (OUTPATIENT)
Dept: FAMILY MEDICINE CLINIC | Age: 64
End: 2019-03-18
Payer: MEDICARE

## 2019-03-18 VITALS
WEIGHT: 167.5 LBS | DIASTOLIC BLOOD PRESSURE: 62 MMHG | TEMPERATURE: 97.9 F | HEIGHT: 67 IN | OXYGEN SATURATION: 96 % | HEART RATE: 82 BPM | SYSTOLIC BLOOD PRESSURE: 106 MMHG | BODY MASS INDEX: 26.29 KG/M2

## 2019-03-18 DIAGNOSIS — Z00.00 MEDICARE ANNUAL WELLNESS VISIT, INITIAL: Primary | ICD-10-CM

## 2019-03-18 DIAGNOSIS — Z12.31 ENCOUNTER FOR SCREENING MAMMOGRAM FOR BREAST CANCER: ICD-10-CM

## 2019-03-18 DIAGNOSIS — E55.9 VITAMIN D DEFICIENCY: ICD-10-CM

## 2019-03-18 DIAGNOSIS — E78.2 MIXED HYPERLIPIDEMIA: ICD-10-CM

## 2019-03-18 DIAGNOSIS — I10 ESSENTIAL (PRIMARY) HYPERTENSION: ICD-10-CM

## 2019-03-18 DIAGNOSIS — M85.80 OSTEOPENIA, UNSPECIFIED LOCATION: ICD-10-CM

## 2019-03-18 DIAGNOSIS — Z23 NEED FOR PNEUMOCOCCAL VACCINATION: ICD-10-CM

## 2019-03-18 PROCEDURE — G0009 ADMIN PNEUMOCOCCAL VACCINE: HCPCS | Performed by: FAMILY MEDICINE

## 2019-03-18 PROCEDURE — 90732 PPSV23 VACC 2 YRS+ SUBQ/IM: CPT | Performed by: FAMILY MEDICINE

## 2019-03-18 PROCEDURE — 1036F TOBACCO NON-USER: CPT | Performed by: FAMILY MEDICINE

## 2019-03-18 PROCEDURE — 1123F ACP DISCUSS/DSCN MKR DOCD: CPT | Performed by: FAMILY MEDICINE

## 2019-03-18 PROCEDURE — G8482 FLU IMMUNIZE ORDER/ADMIN: HCPCS | Performed by: FAMILY MEDICINE

## 2019-03-18 PROCEDURE — G0438 PPPS, INITIAL VISIT: HCPCS | Performed by: FAMILY MEDICINE

## 2019-03-18 PROCEDURE — 99213 OFFICE O/P EST LOW 20 MIN: CPT | Performed by: FAMILY MEDICINE

## 2019-03-18 PROCEDURE — G8427 DOCREV CUR MEDS BY ELIG CLIN: HCPCS | Performed by: FAMILY MEDICINE

## 2019-03-18 PROCEDURE — 1090F PRES/ABSN URINE INCON ASSESS: CPT | Performed by: FAMILY MEDICINE

## 2019-03-18 PROCEDURE — 3017F COLORECTAL CA SCREEN DOC REV: CPT | Performed by: FAMILY MEDICINE

## 2019-03-18 PROCEDURE — 4040F PNEUMOC VAC/ADMIN/RCVD: CPT | Performed by: FAMILY MEDICINE

## 2019-03-18 PROCEDURE — G8399 PT W/DXA RESULTS DOCUMENT: HCPCS | Performed by: FAMILY MEDICINE

## 2019-03-18 PROCEDURE — G8419 CALC BMI OUT NRM PARAM NOF/U: HCPCS | Performed by: FAMILY MEDICINE

## 2019-03-18 RX ORDER — LISINOPRIL 10 MG/1
TABLET ORAL
Qty: 90 TABLET | Refills: 3 | Status: SHIPPED | OUTPATIENT
Start: 2019-03-18 | End: 2020-03-30

## 2019-03-18 RX ORDER — MONTELUKAST SODIUM 10 MG/1
10 TABLET ORAL NIGHTLY
Qty: 90 TABLET | Refills: 3 | Status: SHIPPED | OUTPATIENT
Start: 2019-03-18 | End: 2019-09-19 | Stop reason: SDUPTHER

## 2019-03-18 ASSESSMENT — ANXIETY QUESTIONNAIRES: GAD7 TOTAL SCORE: 2

## 2019-03-18 ASSESSMENT — VISUAL ACUITY
OD_CC: 20/50
OS_CC: 20/25

## 2019-03-18 ASSESSMENT — PATIENT HEALTH QUESTIONNAIRE - PHQ9
SUM OF ALL RESPONSES TO PHQ QUESTIONS 1-9: 2
SUM OF ALL RESPONSES TO PHQ QUESTIONS 1-9: 2

## 2019-03-18 ASSESSMENT — LIFESTYLE VARIABLES: HOW OFTEN DO YOU HAVE A DRINK CONTAINING ALCOHOL: 0

## 2019-03-30 ASSESSMENT — ENCOUNTER SYMPTOMS
SHORTNESS OF BREATH: 0
ABDOMINAL PAIN: 0
ANAL BLEEDING: 0
CHEST TIGHTNESS: 0
DIARRHEA: 0
NAUSEA: 0
CONSTIPATION: 0
COUGH: 0

## 2019-07-05 ENCOUNTER — HOSPITAL ENCOUNTER (OUTPATIENT)
Dept: WOMENS IMAGING | Age: 64
Discharge: HOME | End: 2019-07-05
Payer: MEDICARE

## 2019-07-05 DIAGNOSIS — Z12.31 ENCOUNTER FOR SCREENING MAMMOGRAM FOR BREAST CANCER: ICD-10-CM

## 2019-07-05 PROCEDURE — 77063 BREAST TOMOSYNTHESIS BI: CPT

## 2019-09-19 ENCOUNTER — OFFICE VISIT (OUTPATIENT)
Dept: FAMILY MEDICINE CLINIC | Age: 64
End: 2019-09-19
Payer: MEDICARE

## 2019-09-19 VITALS
DIASTOLIC BLOOD PRESSURE: 56 MMHG | HEART RATE: 83 BPM | BODY MASS INDEX: 26.94 KG/M2 | WEIGHT: 172 LBS | OXYGEN SATURATION: 95 % | TEMPERATURE: 97.9 F | SYSTOLIC BLOOD PRESSURE: 108 MMHG

## 2019-09-19 DIAGNOSIS — Z11.51 SCREENING FOR HPV (HUMAN PAPILLOMAVIRUS): ICD-10-CM

## 2019-09-19 DIAGNOSIS — E78.2 MIXED HYPERLIPIDEMIA: ICD-10-CM

## 2019-09-19 DIAGNOSIS — Z23 FLU VACCINE NEED: Primary | ICD-10-CM

## 2019-09-19 DIAGNOSIS — I10 ESSENTIAL (PRIMARY) HYPERTENSION: ICD-10-CM

## 2019-09-19 DIAGNOSIS — F41.8 DEPRESSION WITH ANXIETY: ICD-10-CM

## 2019-09-19 DIAGNOSIS — Z92.89 PERSONAL HISTORY OF OTHER MEDICAL TREATMENT: ICD-10-CM

## 2019-09-19 DIAGNOSIS — E55.9 VITAMIN D DEFICIENCY: ICD-10-CM

## 2019-09-19 DIAGNOSIS — F41.9 ANXIETY: ICD-10-CM

## 2019-09-19 DIAGNOSIS — Z12.31 ENCOUNTER FOR SCREENING MAMMOGRAM FOR BREAST CANCER: ICD-10-CM

## 2019-09-19 LAB
ALBUMIN SERPL-MCNC: 4.3 G/DL (ref 3.5–5.2)
ALP BLD-CCNC: 69 U/L (ref 35–104)
ALT SERPL-CCNC: 15 U/L (ref 5–33)
ANION GAP SERPL CALCULATED.3IONS-SCNC: 12 MMOL/L (ref 7–19)
AST SERPL-CCNC: 16 U/L (ref 5–32)
BILIRUB SERPL-MCNC: 0.4 MG/DL (ref 0.2–1.2)
BUN BLDV-MCNC: 19 MG/DL (ref 8–23)
CALCIUM SERPL-MCNC: 9.5 MG/DL (ref 8.8–10.2)
CHLORIDE BLD-SCNC: 103 MMOL/L (ref 98–111)
CHOLESTEROL, TOTAL: 173 MG/DL (ref 160–199)
CO2: 28 MMOL/L (ref 22–29)
CREAT SERPL-MCNC: 0.9 MG/DL (ref 0.5–0.9)
GFR NON-AFRICAN AMERICAN: >60
GLUCOSE BLD-MCNC: 96 MG/DL (ref 74–109)
HDLC SERPL-MCNC: 64 MG/DL (ref 65–121)
LDL CHOLESTEROL CALCULATED: 92 MG/DL
POTASSIUM SERPL-SCNC: 4.8 MMOL/L (ref 3.5–5)
SODIUM BLD-SCNC: 143 MMOL/L (ref 136–145)
TOTAL PROTEIN: 7 G/DL (ref 6.6–8.7)
TRIGL SERPL-MCNC: 83 MG/DL (ref 0–149)
VITAMIN D 25-HYDROXY: 50.6 NG/ML

## 2019-09-19 PROCEDURE — G0008 ADMIN INFLUENZA VIRUS VAC: HCPCS | Performed by: FAMILY MEDICINE

## 2019-09-19 PROCEDURE — 1123F ACP DISCUSS/DSCN MKR DOCD: CPT | Performed by: FAMILY MEDICINE

## 2019-09-19 PROCEDURE — G8419 CALC BMI OUT NRM PARAM NOF/U: HCPCS | Performed by: FAMILY MEDICINE

## 2019-09-19 PROCEDURE — 4040F PNEUMOC VAC/ADMIN/RCVD: CPT | Performed by: FAMILY MEDICINE

## 2019-09-19 PROCEDURE — 90653 IIV ADJUVANT VACCINE IM: CPT | Performed by: FAMILY MEDICINE

## 2019-09-19 PROCEDURE — G8427 DOCREV CUR MEDS BY ELIG CLIN: HCPCS | Performed by: FAMILY MEDICINE

## 2019-09-19 PROCEDURE — G8399 PT W/DXA RESULTS DOCUMENT: HCPCS | Performed by: FAMILY MEDICINE

## 2019-09-19 PROCEDURE — 1090F PRES/ABSN URINE INCON ASSESS: CPT | Performed by: FAMILY MEDICINE

## 2019-09-19 PROCEDURE — 3017F COLORECTAL CA SCREEN DOC REV: CPT | Performed by: FAMILY MEDICINE

## 2019-09-19 PROCEDURE — 99214 OFFICE O/P EST MOD 30 MIN: CPT | Performed by: FAMILY MEDICINE

## 2019-09-19 PROCEDURE — 1036F TOBACCO NON-USER: CPT | Performed by: FAMILY MEDICINE

## 2019-09-19 RX ORDER — MONTELUKAST SODIUM 10 MG/1
10 TABLET ORAL NIGHTLY
Qty: 90 TABLET | Refills: 3 | Status: SHIPPED | OUTPATIENT
Start: 2019-09-19 | End: 2020-09-16

## 2019-09-29 ASSESSMENT — ENCOUNTER SYMPTOMS
DIARRHEA: 0
CONSTIPATION: 0
SHORTNESS OF BREATH: 0
COUGH: 0
NAUSEA: 0
CHEST TIGHTNESS: 0
ABDOMINAL PAIN: 0
ANAL BLEEDING: 0

## 2019-11-07 ENCOUNTER — OFFICE VISIT (OUTPATIENT)
Dept: NEUROLOGY | Age: 64
End: 2019-11-07
Payer: MEDICARE

## 2019-11-07 VITALS
OXYGEN SATURATION: 99 % | WEIGHT: 174 LBS | BODY MASS INDEX: 27.31 KG/M2 | HEIGHT: 67 IN | DIASTOLIC BLOOD PRESSURE: 67 MMHG | HEART RATE: 91 BPM | SYSTOLIC BLOOD PRESSURE: 113 MMHG

## 2019-11-07 DIAGNOSIS — Z99.89 CPAP (CONTINUOUS POSITIVE AIRWAY PRESSURE) DEPENDENCE: ICD-10-CM

## 2019-11-07 DIAGNOSIS — G47.33 OBSTRUCTIVE SLEEP APNEA: Primary | ICD-10-CM

## 2019-11-07 PROCEDURE — 99213 OFFICE O/P EST LOW 20 MIN: CPT | Performed by: PHYSICIAN ASSISTANT

## 2019-11-07 PROCEDURE — 1090F PRES/ABSN URINE INCON ASSESS: CPT | Performed by: PHYSICIAN ASSISTANT

## 2019-11-07 PROCEDURE — G8427 DOCREV CUR MEDS BY ELIG CLIN: HCPCS | Performed by: PHYSICIAN ASSISTANT

## 2019-11-07 PROCEDURE — 1123F ACP DISCUSS/DSCN MKR DOCD: CPT | Performed by: PHYSICIAN ASSISTANT

## 2019-11-07 PROCEDURE — 4040F PNEUMOC VAC/ADMIN/RCVD: CPT | Performed by: PHYSICIAN ASSISTANT

## 2019-11-07 PROCEDURE — G8419 CALC BMI OUT NRM PARAM NOF/U: HCPCS | Performed by: PHYSICIAN ASSISTANT

## 2019-11-07 PROCEDURE — G8399 PT W/DXA RESULTS DOCUMENT: HCPCS | Performed by: PHYSICIAN ASSISTANT

## 2019-11-07 PROCEDURE — 1036F TOBACCO NON-USER: CPT | Performed by: PHYSICIAN ASSISTANT

## 2019-11-07 PROCEDURE — G8482 FLU IMMUNIZE ORDER/ADMIN: HCPCS | Performed by: PHYSICIAN ASSISTANT

## 2019-11-07 PROCEDURE — 3017F COLORECTAL CA SCREEN DOC REV: CPT | Performed by: PHYSICIAN ASSISTANT

## 2019-11-18 ENCOUNTER — TELEPHONE (OUTPATIENT)
Dept: FAMILY MEDICINE CLINIC | Age: 64
End: 2019-11-18

## 2019-12-04 RX ORDER — VENLAFAXINE HYDROCHLORIDE 225 MG/1
TABLET, EXTENDED RELEASE ORAL
Qty: 90 TABLET | Refills: 2 | Status: SHIPPED | OUTPATIENT
Start: 2019-12-04 | End: 2019-12-04 | Stop reason: SDUPTHER

## 2019-12-04 RX ORDER — VENLAFAXINE HYDROCHLORIDE 225 MG/1
TABLET, EXTENDED RELEASE ORAL
Qty: 90 TABLET | Refills: 2 | Status: SHIPPED | OUTPATIENT
Start: 2019-12-04 | End: 2020-11-05 | Stop reason: CLARIF

## 2020-03-16 DIAGNOSIS — E78.2 MIXED HYPERLIPIDEMIA: ICD-10-CM

## 2020-03-16 LAB
ALBUMIN SERPL-MCNC: 4.4 G/DL (ref 3.5–5.2)
ALP BLD-CCNC: 58 U/L (ref 35–104)
ALT SERPL-CCNC: 18 U/L (ref 5–33)
ANION GAP SERPL CALCULATED.3IONS-SCNC: 13 MMOL/L (ref 7–19)
AST SERPL-CCNC: 18 U/L (ref 5–32)
BILIRUB SERPL-MCNC: 0.6 MG/DL (ref 0.2–1.2)
BUN BLDV-MCNC: 22 MG/DL (ref 8–23)
CALCIUM SERPL-MCNC: 9.5 MG/DL (ref 8.8–10.2)
CHLORIDE BLD-SCNC: 102 MMOL/L (ref 98–111)
CHOLESTEROL, TOTAL: 168 MG/DL (ref 160–199)
CO2: 28 MMOL/L (ref 22–29)
CREAT SERPL-MCNC: 0.8 MG/DL (ref 0.5–0.9)
GFR NON-AFRICAN AMERICAN: >60
GLUCOSE BLD-MCNC: 93 MG/DL (ref 74–109)
HDLC SERPL-MCNC: 67 MG/DL (ref 65–121)
LDL CHOLESTEROL CALCULATED: 84 MG/DL
POTASSIUM SERPL-SCNC: 4.2 MMOL/L (ref 3.5–5)
SODIUM BLD-SCNC: 143 MMOL/L (ref 136–145)
TOTAL PROTEIN: 6.8 G/DL (ref 6.6–8.7)
TRIGL SERPL-MCNC: 83 MG/DL (ref 0–149)

## 2020-03-30 RX ORDER — LISINOPRIL 10 MG/1
TABLET ORAL
Qty: 90 TABLET | Refills: 0 | Status: SHIPPED | OUTPATIENT
Start: 2020-03-30 | End: 2020-07-01

## 2020-04-06 ENCOUNTER — VIRTUAL VISIT (OUTPATIENT)
Dept: FAMILY MEDICINE CLINIC | Age: 65
End: 2020-04-06
Payer: MEDICARE

## 2020-04-06 VITALS — BODY MASS INDEX: 25.46 KG/M2 | WEIGHT: 168 LBS | HEIGHT: 68 IN

## 2020-04-06 PROCEDURE — G8399 PT W/DXA RESULTS DOCUMENT: HCPCS | Performed by: FAMILY MEDICINE

## 2020-04-06 PROCEDURE — 3017F COLORECTAL CA SCREEN DOC REV: CPT | Performed by: FAMILY MEDICINE

## 2020-04-06 PROCEDURE — G8427 DOCREV CUR MEDS BY ELIG CLIN: HCPCS | Performed by: FAMILY MEDICINE

## 2020-04-06 PROCEDURE — 4040F PNEUMOC VAC/ADMIN/RCVD: CPT | Performed by: FAMILY MEDICINE

## 2020-04-06 PROCEDURE — 1036F TOBACCO NON-USER: CPT | Performed by: FAMILY MEDICINE

## 2020-04-06 PROCEDURE — 99213 OFFICE O/P EST LOW 20 MIN: CPT | Performed by: FAMILY MEDICINE

## 2020-04-06 PROCEDURE — 1090F PRES/ABSN URINE INCON ASSESS: CPT | Performed by: FAMILY MEDICINE

## 2020-04-06 PROCEDURE — 1123F ACP DISCUSS/DSCN MKR DOCD: CPT | Performed by: FAMILY MEDICINE

## 2020-04-06 PROCEDURE — G8417 CALC BMI ABV UP PARAM F/U: HCPCS | Performed by: FAMILY MEDICINE

## 2020-04-06 PROCEDURE — G0439 PPPS, SUBSEQ VISIT: HCPCS | Performed by: FAMILY MEDICINE

## 2020-04-06 ASSESSMENT — LIFESTYLE VARIABLES: HOW OFTEN DO YOU HAVE A DRINK CONTAINING ALCOHOL: 0

## 2020-04-06 ASSESSMENT — PATIENT HEALTH QUESTIONNAIRE - PHQ9
SUM OF ALL RESPONSES TO PHQ QUESTIONS 1-9: 2
SUM OF ALL RESPONSES TO PHQ QUESTIONS 1-9: 2

## 2020-04-06 NOTE — PROGRESS NOTES
Medicare Annual Wellness Visit - Subsequent    Name: Recardo Dancer Date: 2020   MRN: [id number] Sex: Female   Age: 77 y.o. Ethnicity: Non-/Non    : 1955 Race: Thea Fields is here for   Chief Complaint   Patient presents with    Medicare AWV        Screenings for behavioral, psychosocial and functional/safety risks, and cognitive dysfunction are all negative except as indicated below. These results, as well as other patient data from the [address] form, are documented in Flowsheets linked to this Encounter. Mammogram due in July. Cscope UTD. Immunizations UTD. Pap UTD< wnl. Encounter through doxy. me,  End call time 1:32:42pm    Lab Results   Component Value Date     2020    K 4.2 2020     2020    CO2 28 2020    BUN 22 2020    CREATININE 0.8 2020    GLUCOSE 93 2020    CALCIUM 9.5 2020    PROT 6.8 2020    LABALBU 4.4 2020    BILITOT 0.6 2020    ALKPHOS 58 2020    AST 18 2020    ALT 18 2020    LABGLOM >60 2020         Lab Results   Component Value Date    CHOL 168 2020    CHOL 173 2019    CHOL 164 2018     Lab Results   Component Value Date    TRIG 83 2020    TRIG 83 2019    TRIG 74 2018     Lab Results   Component Value Date    HDL 67 2020    HDL 64 (L) 2019    HDL 65 2018     Lab Results   Component Value Date    LDLCALC 84 2020    LDLCALC 92 2019    LDLCALC 84 2018     No results found for: LABVLDL, VLDL  No results found for: CHOLHDLRATIO    Allergies   Allergen Reactions    Latex        Prior to Visit Medications    Medication Sig Taking?  Authorizing Provider   lisinopril (PRINIVIL;ZESTRIL) 10 MG tablet Take 1 tablet by mouth once daily Yes Laine Lim MD   venlafaxine 225 MG extended release tablet TAKE 1 TABLET BY MOUTH ONCE DAILY WITH BREAKFAST Yes Luisa Toro Grandfather         leukemia    Cancer Paternal Grandmother 80        pancreatic cancer    Heart Disease Paternal Cousin         paternal side of family    Cancer Paternal Cousin         colon       CareTeam (Including outside providers/suppliers regularly involved in providing care):   Patient Care Team:  Agnes Tinsley MD as PCP - General (Family Medicine)  Agnes Tinsley MD as PCP - [facility] Empaneled Provider    Wt Readings from Last 3 Encounters:   04/06/20 168 lb (76.2 kg)   11/07/19 174 lb (78.9 kg)   09/19/19 172 lb (78 kg)     Vitals:    04/06/20 1305   Weight: 168 lb (76.2 kg)   Height: 5' 7.5\" (1.715 m)       The following problems were reviewed today and where indicated follow up appointments were made and/or referrals ordered. Risk Factor Screenings with Interventions     Fall Risk:  Timed Up and Go Test > 12 seconds? (Complete if either Fall Risk answers are Yes): no  2 or more falls in past year?: no  Fall with injury in past year?: no  Fall Risk Interventions:    · No further action needed. Depression:  PHQ-2 Score: 2  Depression Interventions:  · Patient declines any further evaluation/treatment for this issue  Weather improving mood. Anxiety Interventions:  · No further action needed. Cognitive:     Cognitive Impairment Interventions:  · No further action needed. Substance Abuse:  Social History     Socioeconomic History    Marital status: Single     Spouse name: Not on file    Number of children: Not on file    Years of education: Not on file    Highest education level: Not on file   Occupational History    Not on file   Social Needs    Financial resource strain: Not on file    Food insecurity     Worry: Not on file     Inability: Not on file    Transportation needs     Medical: Not on file     Non-medical: Not on file   Tobacco Use    Smoking status: Never Smoker    Smokeless tobacco: Never Used   Substance and Sexual Activity    Alcohol use: No    Drug use:  No screen  03/16/2025    DEXA (modify frequency per FRAX score)  Completed    Flu vaccine  Completed    Pneumococcal 65+ years Vaccine  Completed    Hepatitis C screen  Completed    Hepatitis A vaccine  Aged Out    Hepatitis B vaccine  Aged Out    Hib vaccine  Aged Out    Meningococcal (ACWY) vaccine  Aged Out       Recommendations for [employer] Due: see orders. Recommended screening schedule for the next 5-10 years is provided to the patient in written form: see Patient Instructions/AVS.    Labs reviewed and discussed. Encouraged healthy diet and regular exercise. Immunizations UTD. See HPI for further details.

## 2020-04-15 PROBLEM — F41.9 ANXIETY: Status: RESOLVED | Noted: 2017-09-17 | Resolved: 2020-04-15

## 2020-04-15 ASSESSMENT — ENCOUNTER SYMPTOMS
CONSTIPATION: 0
NAUSEA: 0
SHORTNESS OF BREATH: 0
CHEST TIGHTNESS: 0
ABDOMINAL PAIN: 0
DIARRHEA: 0
COUGH: 0
ANAL BLEEDING: 0

## 2020-04-15 NOTE — PROGRESS NOTES
Laterality Date    COLONOSCOPY  10 years    [place]-normal 10 yr recall    DILATION AND CURETTAGE OF UTERUS  2007    [place] COLONOSCOPY FLX DX W/COLLJ SPEC WHEN PFRMD N/A 8/16/2018    Dr Omalley-incomplete    [place] COLONOSCOPY FLX DX W/COLLJ SPEC WHEN PFRMD N/A 8/17/2018    Dr Abena Capone, 5 yr recall   ,   Social History     Tobacco Use    Smoking status: Never Smoker    Smokeless tobacco: Never Used   Substance Use Topics    Alcohol use: No    Drug use: No       PHYSICAL EXAMINATION:  [ INSTRUCTIONS:  \"[x]\" Indicates a positive item  \"[]\" Indicates a negative item  -- DELETE ALL ITEMS NOT EXAMINED]  Vital Signs: (As obtained by patient/caregiver or practitioner observation)    Blood pressure-  Heart rate-    Respiratory rate-    Temperature-  Pulse oximetry-     Constitutional: [x] Appears well-developed and well-nourished [x] No apparent distress      [] Abnormal-   Mental status  [x] Alert and awake  [x] Oriented to person/place/time []Able to follow commands      Eyes:  EOM    [x]  Normal  [] Abnormal-  Sclera  [x]  Normal  [] Abnormal -         Discharge []  None visible  [] Abnormal -    HENT:   [x] Normocephalic, atraumatic.   [] Abnormal   [] Mouth/Throat: Mucous membranes are moist.     External Ears [x] Normal  [] Abnormal-     Neck: [x] No visualized mass     Pulmonary/Chest: [x] Respiratory effort normal.  [x] No visualized signs of difficulty breathing or respiratory distress        [] Abnormal-      Musculoskeletal:   [x] Normal gait with no signs of ataxia         [] Normal range of motion of neck        [] Abnormal-       Neurological:        [x] No Facial Asymmetry (Cranial nerve 7 motor function) (limited exam to video visit)          [] No gaze palsy        [] Abnormal-         Skin:        [x] No significant exanthematous lesions or discoloration noted on facial skin         [] Abnormal-            Psychiatric:       [x] Normal Affect [] No Hallucinations        [] Abnormal-     Other pertinent observable physical exam findings-     ASSESSMENT/PLAN:  1. Sensorineural hearing loss (SNHL) of both ears  Refer to audiology for testing after pandemic resolved. [facility] (Audiology) [place]    2. Encounter for screening mammogram for breast cancer  Refer for mammogram.   - [place] DIGITAL SCREEN W CAD BILATERAL; Future    3. Depression with anxiety  Stable, continue effexor at current dose. 4. Essential (primary) hypertension  Blood pressure is stable. Continue current medications. Monitor ambulatory bp readings, if persistently >140/90, return to clinic. 5. Mixed hyperlipidemia  STable, continue same. Return in about 6 months (around 10/6/2020). Brigid Bruno is a 77 y.o. female being evaluated by a Virtual Visit (video visit) encounter to address concerns as mentioned above. A caregiver was present when appropriate. Due to this being a TeleHealth encounter ([facility]), evaluation of the following organ systems was limited: Vitals/Constitutional/EENT/Resp/CV/GI//MS/Neuro/Skin/Heme-Lymph-Imm. Pursuant to the emergency declaration under the [facility], [address] [place] [address] authority and the [employer] Act, this Virtual Visit was conducted with patient's (and/or legal guardian's) consent, to reduce the patient's risk of exposure to COVID-19 and provide necessary medical care. The patient (and/or legal guardian) has also been advised to contact this office for worsening conditions or problems, and seek emergency medical treatment and/or call 911 if deemed necessary. Services were provided through a video synchronous discussion virtually to substitute for in-person clinic visit. Patient and provider were located at their individual homes. --Harper Holstein, MD on 4/15/2020 at 5:23 PM    An electronic signature was used to authenticate this note.

## 2020-05-21 ENCOUNTER — PROCEDURE VISIT (OUTPATIENT)
Dept: OTOLARYNGOLOGY | Age: 65
End: 2020-05-21
Payer: MEDICARE

## 2020-05-21 PROBLEM — H90.3 SENSORINEURAL HEARING LOSS (SNHL) OF BOTH EARS: Status: ACTIVE | Noted: 2020-05-21

## 2020-05-21 PROCEDURE — 92557 COMPREHENSIVE HEARING TEST: CPT | Performed by: AUDIOLOGIST

## 2020-05-21 PROCEDURE — 92550 TYMPANOMETRY & REFLEX THRESH: CPT | Performed by: AUDIOLOGIST

## 2020-05-22 ENCOUNTER — VIRTUAL VISIT (OUTPATIENT)
Dept: FAMILY MEDICINE CLINIC | Age: 65
End: 2020-05-22
Payer: MEDICARE

## 2020-05-22 PROCEDURE — 99213 OFFICE O/P EST LOW 20 MIN: CPT | Performed by: CLINICAL NURSE SPECIALIST

## 2020-05-22 PROCEDURE — 1123F ACP DISCUSS/DSCN MKR DOCD: CPT | Performed by: CLINICAL NURSE SPECIALIST

## 2020-05-22 PROCEDURE — G8427 DOCREV CUR MEDS BY ELIG CLIN: HCPCS | Performed by: CLINICAL NURSE SPECIALIST

## 2020-05-22 PROCEDURE — 1090F PRES/ABSN URINE INCON ASSESS: CPT | Performed by: CLINICAL NURSE SPECIALIST

## 2020-05-22 PROCEDURE — G8399 PT W/DXA RESULTS DOCUMENT: HCPCS | Performed by: CLINICAL NURSE SPECIALIST

## 2020-05-22 PROCEDURE — 3017F COLORECTAL CA SCREEN DOC REV: CPT | Performed by: CLINICAL NURSE SPECIALIST

## 2020-05-22 PROCEDURE — 4040F PNEUMOC VAC/ADMIN/RCVD: CPT | Performed by: CLINICAL NURSE SPECIALIST

## 2020-05-22 RX ORDER — FLUCONAZOLE 100 MG/1
100 TABLET ORAL DAILY
Qty: 7 TABLET | Refills: 0 | Status: SHIPPED | OUTPATIENT
Start: 2020-05-22 | End: 2020-05-29

## 2020-05-22 RX ORDER — CLOTRIMAZOLE AND BETAMETHASONE DIPROPIONATE 10; .64 MG/G; MG/G
CREAM TOPICAL
Qty: 15 G | Refills: 0 | Status: SHIPPED | OUTPATIENT
Start: 2020-05-22 | End: 2021-01-29

## 2020-05-22 NOTE — PROGRESS NOTES
SUBJECTIVE:  Amanda Woods is a 77 y.o. who presents today for Rash      HPI    VIRTUAL VISIT VIA TELEPHONE    _______________________________________________________________    Due to COVID 23 outbreak, patient's office visit was converted to telephone virtual visit. Patient was contacted and agreed to proceed with a telephone virtual visit. The risks and benefits of converting to a telephone virtual visit were discussed in light of the current infectious disease epidemic. Patient also understood that insurance coverage and co-pays are up to their individual insurance plans. Ms Oli Kearns was evaluated today via doxy. me for c/o rash/skin redness to her face. She developed a flat, erythematous area to the left side of her face at least 2 weeks ago. 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 She has used that without any relief. At first she thought possibly poison ivy and used alcohol but no change. The redness has worsened crossing her mouth and over to the right chin area. It remains flat, dry and scaling. Itching. No bleeding. No systemic symptoms or lesions elsewhere. She has been wearing face covering when out in public. Yesterday she wore a mask and it seemed to aggravate symptoms.      Past Medical History:   Diagnosis Date    Anxiety     Asymptomatic postmenopausal status (age-related) (natural)     CPAP (continuous positive airway pressure) dependence     11cm    Depression     Headaches due to old head injury     Hyperlipidemia     Hypertension     Joint pain     knee and shoulder    Macular degeneration disease     beginning stage    Neoplasm of uncertain behavior of skin     Obstructive sleep apnea     AHI:  44.5 per PSG, 2/2009    Ringing in ears     Seasonal allergies     Unspecified sleep apnea     uses c-pap    Vitamin D deficiency      Past Surgical History:   Procedure Laterality Date    COLONOSCOPY  10 years    [place] [id number] facility-administered medications for this visit. Allergies   Allergen Reactions    Latex        Review of Systems   Constitutional: Negative for chills and fever. Skin: Positive for rash. OBJECTIVE:  There were no vitals taken for this visit. Physical Exam  Constitutional:       General: She is not in acute distress. Appearance: Normal appearance. She is not ill-appearing or toxic-appearing. HENT:      Head: Normocephalic. Nose: Nose normal. No congestion. Neck:      Musculoskeletal: Normal range of motion. Pulmonary:      Effort: Pulmonary effort is normal. No respiratory distress. Breath sounds: No wheezing. Skin:     Findings: Erythema (flat erythema from left cheek across mouth to right chin area) present. Neurological:      Mental Status: She is alert. ASSESSMENT/PLAN:  1. Fungal skin infection  Suspected, most likely moisture from mask wearing is aggravating vs irritant/contact  Limit use of face covering when possible  Return if symptoms persist or worsen  - fluconazole (DIFLUCAN) 100 MG tablet; Take 1 tablet by mouth daily for 7 days  Dispense: 7 tablet; Refill: 0  - clotrimazole-betamethasone (LOTRISONE) 1-0.05 % cream; Apply topically 2 times daily. Dispense: 15 g; Refill: 0    Greater than 50% of 15 minute visit was spent in direct coordination with the patient for MDM. Return for already scheduled.

## 2020-06-12 ENCOUNTER — TELEPHONE (OUTPATIENT)
Dept: FAMILY MEDICINE CLINIC | Age: 65
End: 2020-06-12

## 2020-06-12 RX ORDER — FLUCONAZOLE 100 MG/1
100 TABLET ORAL DAILY
Qty: 7 TABLET | Refills: 0 | Status: SHIPPED | OUTPATIENT
Start: 2020-06-12 | End: 2020-06-19

## 2020-06-12 NOTE — TELEPHONE ENCOUNTER
Does she think she just needs the cream or the oral as well? Ideally I would just like to repeat the cream if she is out?

## 2020-07-01 RX ORDER — LISINOPRIL 10 MG/1
TABLET ORAL
Qty: 90 TABLET | Refills: 0 | Status: SHIPPED | OUTPATIENT
Start: 2020-07-01 | End: 2020-10-05

## 2020-07-06 ENCOUNTER — TELEPHONE (OUTPATIENT)
Dept: FAMILY MEDICINE CLINIC | Age: 65
End: 2020-07-06

## 2020-07-06 NOTE — TELEPHONE ENCOUNTER
She really should not have to keep taking the diflucan. It worries me that this keeps coming back.   I think she needs appt this week with someone, even if virtual

## 2020-07-06 NOTE — TELEPHONE ENCOUNTER
Patient called and is requesting a refill on diflucan due to \"fungus around my mouth\".  She states that she has enough ointment and just needs the pills

## 2020-07-07 ENCOUNTER — OFFICE VISIT (OUTPATIENT)
Dept: FAMILY MEDICINE CLINIC | Age: 65
End: 2020-07-07
Payer: MEDICARE

## 2020-07-07 VITALS
BODY MASS INDEX: 26.7 KG/M2 | WEIGHT: 173 LBS | OXYGEN SATURATION: 98 % | DIASTOLIC BLOOD PRESSURE: 60 MMHG | TEMPERATURE: 98.5 F | SYSTOLIC BLOOD PRESSURE: 114 MMHG | HEART RATE: 101 BPM

## 2020-07-07 DIAGNOSIS — R63.5 WEIGHT GAIN: ICD-10-CM

## 2020-07-07 DIAGNOSIS — R21 SKIN RASH: ICD-10-CM

## 2020-07-07 LAB
ALBUMIN SERPL-MCNC: 4.3 G/DL (ref 3.5–5.2)
ALP BLD-CCNC: 55 U/L (ref 35–104)
ALT SERPL-CCNC: 18 U/L (ref 5–33)
ANION GAP SERPL CALCULATED.3IONS-SCNC: 11 MMOL/L (ref 7–19)
AST SERPL-CCNC: 20 U/L (ref 5–32)
BASOPHILS ABSOLUTE: 0 K/UL (ref 0–0.2)
BASOPHILS RELATIVE PERCENT: 0.8 % (ref 0–1)
BILIRUB SERPL-MCNC: 0.3 MG/DL (ref 0.2–1.2)
BUN BLDV-MCNC: 37 MG/DL (ref 8–23)
CALCIUM SERPL-MCNC: 9.3 MG/DL (ref 8.8–10.2)
CHLORIDE BLD-SCNC: 103 MMOL/L (ref 98–111)
CO2: 28 MMOL/L (ref 22–29)
CREAT SERPL-MCNC: 1 MG/DL (ref 0.5–0.9)
EOSINOPHILS ABSOLUTE: 0.1 K/UL (ref 0–0.6)
EOSINOPHILS RELATIVE PERCENT: 1.2 % (ref 0–5)
FOLATE: >20 NG/ML (ref 4.8–37.3)
GFR NON-AFRICAN AMERICAN: 55
GLUCOSE BLD-MCNC: 91 MG/DL (ref 74–109)
HCT VFR BLD CALC: 37.4 % (ref 37–47)
HEMOGLOBIN: 12.3 G/DL (ref 12–16)
IMMATURE GRANULOCYTES #: 0 K/UL
LYMPHOCYTES ABSOLUTE: 1.9 K/UL (ref 1.1–4.5)
LYMPHOCYTES RELATIVE PERCENT: 37.5 % (ref 20–40)
MCH RBC QN AUTO: 30.3 PG (ref 27–31)
MCHC RBC AUTO-ENTMCNC: 32.9 G/DL (ref 33–37)
MCV RBC AUTO: 92.1 FL (ref 81–99)
MONOCYTES ABSOLUTE: 0.5 K/UL (ref 0–0.9)
MONOCYTES RELATIVE PERCENT: 9.9 % (ref 0–10)
NEUTROPHILS ABSOLUTE: 2.6 K/UL (ref 1.5–7.5)
NEUTROPHILS RELATIVE PERCENT: 50.2 % (ref 50–65)
PDW BLD-RTO: 12.4 % (ref 11.5–14.5)
PLATELET # BLD: 230 K/UL (ref 130–400)
PMV BLD AUTO: 10.1 FL (ref 9.4–12.3)
POTASSIUM SERPL-SCNC: 4.2 MMOL/L (ref 3.5–5)
RBC # BLD: 4.06 M/UL (ref 4.2–5.4)
SODIUM BLD-SCNC: 142 MMOL/L (ref 136–145)
TOTAL PROTEIN: 6.6 G/DL (ref 6.6–8.7)
TSH SERPL DL<=0.05 MIU/L-ACNC: 1.27 UIU/ML (ref 0.27–4.2)
VITAMIN B-12: 750 PG/ML (ref 211–946)
WBC # BLD: 5.1 K/UL (ref 4.8–10.8)

## 2020-07-07 PROCEDURE — G8399 PT W/DXA RESULTS DOCUMENT: HCPCS | Performed by: CLINICAL NURSE SPECIALIST

## 2020-07-07 PROCEDURE — G8427 DOCREV CUR MEDS BY ELIG CLIN: HCPCS | Performed by: CLINICAL NURSE SPECIALIST

## 2020-07-07 PROCEDURE — G8417 CALC BMI ABV UP PARAM F/U: HCPCS | Performed by: CLINICAL NURSE SPECIALIST

## 2020-07-07 PROCEDURE — 3017F COLORECTAL CA SCREEN DOC REV: CPT | Performed by: CLINICAL NURSE SPECIALIST

## 2020-07-07 PROCEDURE — 1090F PRES/ABSN URINE INCON ASSESS: CPT | Performed by: CLINICAL NURSE SPECIALIST

## 2020-07-07 PROCEDURE — 1123F ACP DISCUSS/DSCN MKR DOCD: CPT | Performed by: CLINICAL NURSE SPECIALIST

## 2020-07-07 PROCEDURE — 4040F PNEUMOC VAC/ADMIN/RCVD: CPT | Performed by: CLINICAL NURSE SPECIALIST

## 2020-07-07 PROCEDURE — 99213 OFFICE O/P EST LOW 20 MIN: CPT | Performed by: CLINICAL NURSE SPECIALIST

## 2020-07-07 PROCEDURE — 1036F TOBACCO NON-USER: CPT | Performed by: CLINICAL NURSE SPECIALIST

## 2020-07-07 NOTE — PROGRESS NOTES
SUBJECTIVE:  Charito Lamb is a 77 y.o. who presents today for Rash (Patient has rash around the outside of her mouth.)      HPI    Ms Eleni Nolan presents today for recurrent skin rash around her mouth. She initially had virtual visit with me on 5/22. She had a flat, erythematous rash around her mouth that had been present for 2 weeks. Another provider had treated her with lotrimin but she did not improve. She was concerned it was contact or irritant from mask wearing. I treated her for fungal skin infection with lotrisone cream and diflucan. This did resolve, but now has come back 3 times. I retreated her the 2nd time, but asked her to come today. She continues with cracking to lips, flat erythematous skin patches to each side of mouth. Other than wearing a mask in public, which is once a week, she does wear CPAP mask at night and a face mask while mowing. Nothing new to skin otherwise. There are no systemic changes, she has gained some weight but blames on diet. Generally feels well otherwise.      Past Medical History:   Diagnosis Date    Anxiety     Asymptomatic postmenopausal status (age-related) (natural)     CPAP (continuous positive airway pressure) dependence     11cm    Depression     Headaches due to old head injury     Hyperlipidemia     Hypertension     Joint pain     knee and shoulder    Macular degeneration disease     beginning stage    Neoplasm of uncertain behavior of skin     Obstructive sleep apnea     AHI:  44.5 per PSG, 2/2009    Ringing in ears     Seasonal allergies     Unspecified sleep apnea     uses c-pap    Vitamin D deficiency      Past Surgical History:   Procedure Laterality Date    COLONOSCOPY  10 years    [place]-normal 10 yr recall    DILATION AND CURETTAGE OF UTERUS  2007    [place] COLONOSCOPY FLX DX W/COLLJ SPEC WHEN PFRMD N/A 8/16/2018    Dr Omalley-incomplete    [place] COLONOSCOPY FLX DX W/COLLJ SPEC WHEN PFRMD N/A 8/17/2018    Dr Jaxson Gonzalez, 5 yr recall     Family History   Problem Relation Age of Onset   Ambroc[employer] Filter Stroke Father     Hypertension Father     Other Father         tumor removal, lower [place] area   [place] Filter Stroke Mother     Hypertension Mother    Boaz Mcnulty Mother     Other Maternal Grandmother         hodgkins    Other Maternal Grandfather         leukemia    Cancer Paternal Grandmother 80        pancreatic cancer    Heart Disease Paternal Cousin         paternal side of family    Cancer Paternal Cousin         colon     Social History     Tobacco Use    Smoking status: Never Smoker    Smokeless tobacco: Never Used   Substance Use Topics    Alcohol use: No     Current Outpatient Medications   Medication Sig Dispense Refill    lisinopril (PRINIVIL;ZESTRIL) 10 MG tablet Take 1 tablet by mouth once daily 90 tablet 0    clotrimazole-betamethasone (LOTRISONE) 1-0.05 % cream Apply topically 2 times daily. 15 g 0    venlafaxine 225 MG extended release tablet TAKE 1 TABLET BY MOUTH ONCE DAILY WITH BREAKFAST 90 tablet 2    montelukast (SINGULAIR) 10 MG tablet Take 1 tablet by mouth nightly 90 tablet 3    Multiple Vitamins-Minerals (OCUVITE ADULT 50+ PO) Take by mouth daily      zoster recombinant adjuvanted vaccine (SHINGRIX) 50 MCG/0.5ML SUSR injection Inject 0.5 mLs into the muscle See Admin Instructions 1 dose now and repeat in 2-6 months 1 each 1    fexofenadine (ALLEGRA ALLERGY) 180 MG tablet Take 180 mg by mouth daily      Calcium-Vitamin D (CALTRATE 600 PLUS-VIT D PO) Take  by mouth 2 times daily.  therapeutic multivitamin-minerals (THERAGRAN-M) tablet Take 1 tablet by mouth daily. No current facility-administered medications for this visit. Allergies   Allergen Reactions    Latex        Review of Systems   Constitutional: Negative for activity change, appetite change, chills, fever and unexpected weight change. Musculoskeletal: Negative for arthralgias and myalgias. Skin: Positive for rash.    Neurological: Negative for dizziness, weakness and headaches. Psychiatric/Behavioral: Negative for dysphoric mood and sleep disturbance. OBJECTIVE:  /60   Pulse 101   Temp 98.5 °F (36.9 °C) (Temporal)   Wt 173 lb (78.5 kg)   SpO2 98%   BMI 26.70 kg/m²    Physical Exam  Vitals signs reviewed. Constitutional:       General: She is not in acute distress. Appearance: Normal appearance. She is not ill-appearing or toxic-appearing. HENT:      Head: Normocephalic. Nose: Nose normal. No congestion or rhinorrhea. Mouth/Throat:      Mouth: Mucous membranes are dry. Pharynx: No oropharyngeal exudate or posterior oropharyngeal erythema. Neck:      Musculoskeletal: Normal range of motion. Lymphadenopathy:      Cervical: No cervical adenopathy. Skin:     Findings: Erythema (skin cracking to lips, flat patch like erythema to left lateral aspect and right lower skin below mouth) present. Neurological:      General: No focal deficit present. Mental Status: She is alert and oriented to person, place, and time. Mental status is at baseline. Motor: No weakness. Psychiatric:         Mood and Affect: Mood normal.         Behavior: Behavior normal.         Thought Content: Thought content normal.         Judgment: Judgment normal.         ASSESSMENT/PLAN:  1. Skin rash  Recurrent, r/o systemic cause  Continue lotrisone for now  Apply aquaphor as moisture barrier  - CBC Auto Differential; Future  - Comprehensive Metabolic Panel; Future  - Vitamin B12; Future  - TSH without Reflex; Future  - Folate; Future    2. Weight gain  - CBC Auto Differential; Future  - Comprehensive Metabolic Panel; Future  - Vitamin B12; Future  - TSH without Reflex; Future  - Folate; Future          Return for already scheduled.

## 2020-07-13 ENCOUNTER — HOSPITAL ENCOUNTER (OUTPATIENT)
Dept: WOMENS IMAGING | Age: 65
Discharge: HOME | End: 2020-07-13
Payer: MEDICARE

## 2020-07-13 PROCEDURE — 77063 BREAST TOMOSYNTHESIS BI: CPT

## 2020-09-10 ENCOUNTER — NURSE ONLY (OUTPATIENT)
Dept: FAMILY MEDICINE CLINIC | Age: 65
End: 2020-09-10
Payer: MEDICARE

## 2020-09-10 PROCEDURE — G0008 ADMIN INFLUENZA VIRUS VAC: HCPCS | Performed by: CLINICAL NURSE SPECIALIST

## 2020-09-10 PROCEDURE — 90694 VACC AIIV4 NO PRSRV 0.5ML IM: CPT | Performed by: CLINICAL NURSE SPECIALIST

## 2020-09-16 RX ORDER — MONTELUKAST SODIUM 10 MG/1
10 TABLET ORAL NIGHTLY
Qty: 90 TABLET | Refills: 0 | Status: SHIPPED | OUTPATIENT
Start: 2020-09-16 | End: 2020-12-29

## 2020-10-22 ENCOUNTER — OFFICE VISIT (OUTPATIENT)
Dept: FAMILY MEDICINE CLINIC | Age: 65
End: 2020-10-22
Payer: MEDICARE

## 2020-10-22 VITALS
OXYGEN SATURATION: 96 % | HEIGHT: 67 IN | TEMPERATURE: 97.1 F | WEIGHT: 168.4 LBS | SYSTOLIC BLOOD PRESSURE: 110 MMHG | HEART RATE: 76 BPM | BODY MASS INDEX: 26.43 KG/M2 | DIASTOLIC BLOOD PRESSURE: 74 MMHG

## 2020-10-22 DIAGNOSIS — I10 ESSENTIAL (PRIMARY) HYPERTENSION: ICD-10-CM

## 2020-10-22 PROBLEM — I83.813 VARICOSE VEINS OF BOTH LOWER EXTREMITIES WITH PAIN: Status: ACTIVE | Noted: 2020-10-22

## 2020-10-22 LAB
ANION GAP SERPL CALCULATED.3IONS-SCNC: 11 MMOL/L (ref 7–19)
BUN BLDV-MCNC: 24 MG/DL (ref 8–23)
CALCIUM SERPL-MCNC: 9.9 MG/DL (ref 8.8–10.2)
CHLORIDE BLD-SCNC: 102 MMOL/L (ref 98–111)
CO2: 30 MMOL/L (ref 22–29)
CREAT SERPL-MCNC: 0.8 MG/DL (ref 0.5–0.9)
GFR AFRICAN AMERICAN: >59
GFR NON-AFRICAN AMERICAN: >60
GLUCOSE BLD-MCNC: 94 MG/DL (ref 74–109)
POTASSIUM SERPL-SCNC: 4.2 MMOL/L (ref 3.5–5)
SODIUM BLD-SCNC: 143 MMOL/L (ref 136–145)

## 2020-10-22 PROCEDURE — 1036F TOBACCO NON-USER: CPT | Performed by: FAMILY MEDICINE

## 2020-10-22 PROCEDURE — G8427 DOCREV CUR MEDS BY ELIG CLIN: HCPCS | Performed by: FAMILY MEDICINE

## 2020-10-22 PROCEDURE — 4040F PNEUMOC VAC/ADMIN/RCVD: CPT | Performed by: FAMILY MEDICINE

## 2020-10-22 PROCEDURE — 3017F COLORECTAL CA SCREEN DOC REV: CPT | Performed by: FAMILY MEDICINE

## 2020-10-22 PROCEDURE — G8484 FLU IMMUNIZE NO ADMIN: HCPCS | Performed by: FAMILY MEDICINE

## 2020-10-22 PROCEDURE — 99214 OFFICE O/P EST MOD 30 MIN: CPT | Performed by: FAMILY MEDICINE

## 2020-10-22 PROCEDURE — G8399 PT W/DXA RESULTS DOCUMENT: HCPCS | Performed by: FAMILY MEDICINE

## 2020-10-22 PROCEDURE — 1123F ACP DISCUSS/DSCN MKR DOCD: CPT | Performed by: FAMILY MEDICINE

## 2020-10-22 PROCEDURE — G8417 CALC BMI ABV UP PARAM F/U: HCPCS | Performed by: FAMILY MEDICINE

## 2020-10-22 PROCEDURE — 1090F PRES/ABSN URINE INCON ASSESS: CPT | Performed by: FAMILY MEDICINE

## 2020-10-22 NOTE — PROGRESS NOTES
[facility] PHYSICIAN SERVICES  [facility] FAMILY MEDICINE  [address] [place] [address] Jeyson Roldan [id number]  Dept: [phone]  Dept Fax: [phone]  Loc: [phone]     Sudie Bence is a 77 y.o. female who presents today for her medical conditions/complaintsas noted below. Sudie Bence is c/o of Follow-up        HPI:   Patient is here for follow up. Hypertension  Compliant with medications. No adverse effects from medication. No lightheadedness, palpitations, or chest pain. She has swelling and spider veins on bilateral legs, worse on left with swelling. She has pain in legs, would like procedure to help with swelling and pain, has used compression hose. She reports lateral knee pain at times, no swelling. She has aching pain, no buckling, no injury. Anxiety  Compliant with medications. No adverse effects from medication. No panic or anxiety attacks since last visit. Symptoms are controlled. No excessive worry or insomnia. No issues with depression    Hypertension  Compliant with medications. No adverse effects from medication. No lightheadedness, palpitations, or chest pain.         HPI    Past Medical History:   Diagnosis Date    Anxiety     Asymptomatic postmenopausal status (age-related) (natural)     CPAP (continuous positive airway pressure) dependence     11cm    Depression     Headaches due to old head injury     Hyperlipidemia     Hypertension     Joint pain     knee and shoulder    Macular degeneration disease     beginning stage    Neoplasm of uncertain behavior of skin     Obstructive sleep apnea     AHI:  44.5 per PSG, 2/2009    Ringing in ears     Seasonal allergies     Unspecified sleep apnea     uses c-pap    Vitamin D deficiency      Past Surgical History:   Procedure Laterality Date    COLONOSCOPY  10 years    [place]-normal 10 yr recall    DILATION AND CURETTAGE OF UTERUS  2007    [place] COLONOSCOPY FLX DX W/COLLJ SPEC WHEN PFRMD N/A 8/16/2018    Dr Harjinder Peck FLX DX W/COLLJ SPEC WHEN PFRMD N/A 8/17/2018    Dr Pascale Roy, 5 yr recall       Family History   Problem Relation Age of Onset    Stroke Father     Hypertension Father     Other Father         tumor removal, [facility]   [facility] Stroke Mother     Hypertension Mother    Gallito Servant Mother     Other Maternal Grandmother         hodgkins    Other Maternal Grandfather         leukemia    Cancer Paternal Grandmother 80        pancreatic cancer    Heart Disease Paternal Cousin         paternal side of family    Cancer Paternal Cousin         colon       Social History     Tobacco Use    Smoking status: Never Smoker    Smokeless tobacco: Never Used   Substance Use Topics    Alcohol use: No      Current Outpatient Medications   Medication Sig Dispense Refill    lisinopril (PRINIVIL;ZESTRIL) 10 MG tablet Take 1 tablet by mouth once daily 90 tablet 2    montelukast (SINGULAIR) 10 MG tablet Take 1 tablet by mouth nightly 90 tablet 0    venlafaxine 225 MG extended release tablet TAKE 1 TABLET BY MOUTH ONCE DAILY WITH BREAKFAST 90 tablet 2    Multiple Vitamins-Minerals (OCUVITE ADULT 50+ PO) Take by mouth daily      fexofenadine (ALLEGRA ALLERGY) 180 MG tablet Take 180 mg by mouth daily      Calcium-Vitamin D (CALTRATE 600 PLUS-VIT D PO) Take  by mouth 2 times daily.  therapeutic multivitamin-minerals (THERAGRAN-M) tablet Take 1 tablet by mouth daily.  clotrimazole-betamethasone (LOTRISONE) 1-0.05 % cream Apply topically 2 times daily. (Patient not taking: Reported on 10/22/2020) 15 g 0    zoster recombinant adjuvanted vaccine (SHINGRIX) 50 MCG/0.5ML SUSR injection Inject 0.5 mLs into the muscle See Admin Instructions 1 dose now and repeat in 2-6 months 1 each 1     No current facility-administered medications for this visit.       Allergies   Allergen Reactions    Latex        Health Maintenance   Topic Date Due    Shingles Vaccine (2 of 3)

## 2020-11-02 ENCOUNTER — TELEPHONE (OUTPATIENT)
Dept: FAMILY MEDICINE CLINIC | Age: 65
End: 2020-11-02

## 2020-11-02 NOTE — TELEPHONE ENCOUNTER
Pt calls today because she is trying to set up her medications for medicare drug plan for 2021. Her Venlafaxine is not covered for 2021. The alternatives that her insurance will cover are Citalopram 40 mg and sertraline 100 mg. These doses cannot be increased.  Please advise

## 2020-11-03 ENCOUNTER — TELEPHONE (OUTPATIENT)
Dept: FAMILY MEDICINE CLINIC | Age: 65
End: 2020-11-03

## 2020-11-03 NOTE — TELEPHONE ENCOUNTER
Pt calls because she is trying to get her medications sorted out for her medicare drug plan for 2021. Venlafaxine is not covered this year at her 225 dose. The max dose they will cover is 150. She wants to know if you think it is ok for her to take that dose or should she try an alternative? She said she can get the 150 90 day supply at [facility] for a good price. Please advise.

## 2020-11-03 NOTE — TELEPHONE ENCOUNTER
May not be covered due to extended release she is taking.  Would suggest she call and see if covered for immediate release, could do 75 mg tablets to equal total dose

## 2020-11-03 NOTE — TELEPHONE ENCOUNTER
Pt called today and said that they will cover a max dose of 150 Venlafaxine. She wants to know if you think that dose would be ok.

## 2020-11-05 RX ORDER — VENLAFAXINE HYDROCHLORIDE 150 MG/1
150 CAPSULE, EXTENDED RELEASE ORAL DAILY
Qty: 90 CAPSULE | Refills: 0 | Status: SHIPPED | OUTPATIENT
Start: 2020-11-05 | End: 2020-12-01 | Stop reason: SDUPTHER

## 2020-11-05 ASSESSMENT — ENCOUNTER SYMPTOMS
CONSTIPATION: 0
SHORTNESS OF BREATH: 0
ANAL BLEEDING: 0
NAUSEA: 0
DIARRHEA: 0
ABDOMINAL PAIN: 0
COUGH: 0
CHEST TIGHTNESS: 0

## 2020-11-09 ENCOUNTER — OFFICE VISIT (OUTPATIENT)
Dept: NEUROLOGY | Age: 65
End: 2020-11-09
Payer: MEDICARE

## 2020-11-09 VITALS
BODY MASS INDEX: 26.68 KG/M2 | OXYGEN SATURATION: 98 % | SYSTOLIC BLOOD PRESSURE: 104 MMHG | WEIGHT: 170 LBS | HEART RATE: 86 BPM | DIASTOLIC BLOOD PRESSURE: 61 MMHG | HEIGHT: 67 IN | TEMPERATURE: 97.5 F

## 2020-11-09 PROCEDURE — 1090F PRES/ABSN URINE INCON ASSESS: CPT | Performed by: PHYSICIAN ASSISTANT

## 2020-11-09 PROCEDURE — 1123F ACP DISCUSS/DSCN MKR DOCD: CPT | Performed by: PHYSICIAN ASSISTANT

## 2020-11-09 PROCEDURE — G8484 FLU IMMUNIZE NO ADMIN: HCPCS | Performed by: PHYSICIAN ASSISTANT

## 2020-11-09 PROCEDURE — 3017F COLORECTAL CA SCREEN DOC REV: CPT | Performed by: PHYSICIAN ASSISTANT

## 2020-11-09 PROCEDURE — G8417 CALC BMI ABV UP PARAM F/U: HCPCS | Performed by: PHYSICIAN ASSISTANT

## 2020-11-09 PROCEDURE — G8399 PT W/DXA RESULTS DOCUMENT: HCPCS | Performed by: PHYSICIAN ASSISTANT

## 2020-11-09 PROCEDURE — G8428 CUR MEDS NOT DOCUMENT: HCPCS | Performed by: PHYSICIAN ASSISTANT

## 2020-11-09 PROCEDURE — 4040F PNEUMOC VAC/ADMIN/RCVD: CPT | Performed by: PHYSICIAN ASSISTANT

## 2020-11-09 PROCEDURE — 99213 OFFICE O/P EST LOW 20 MIN: CPT | Performed by: PHYSICIAN ASSISTANT

## 2020-11-09 PROCEDURE — 1036F TOBACCO NON-USER: CPT | Performed by: PHYSICIAN ASSISTANT

## 2020-11-09 NOTE — PATIENT INSTRUCTIONS
Patient education: Sleep apnea in adults       INTRODUCTION -- Normally during sleep, air moves through the throat and in and out of the lungs at a regular rhythm. In a person with sleep apnea, air movement is periodically diminished or stopped. There are two types of sleep apnea: obstructive sleep apnea and central sleep apnea. In obstructive sleep apnea, breathing is abnormal because of narrowing or closure of the throat. In central sleep apnea, breathing is abnormal because of a change in the breathing control and rhythm. Sleep apnea is a serious condition that can affect a person's ability to safely perform normal daily activities and can affect long term health. Approximately 25 percent of adults are at risk for sleep apnea of some degree. Men are more commonly affected than women. Other risk factors include middle and older age, being overweight or obese, and having a small mouth and throat. This topic review focuses on the most common type of sleep apnea in adults, obstructive sleep apnea (PRASAD). HOW SLEEP APNEA OCCURS -- The throat is surrounded by muscles that control the airway for speaking, swallowing, and breathing. During sleep, these muscles are less active, and this causes the throat to narrow. In most people, this narrowing does not affect breathing. In others, it can cause snoring, sometimes with reduced or completely blocked airflow. A completely blocked airway without airflow is called an obstructive apnea. Partial obstruction with diminished airflow is called a hypopnea. A person may have apnea and hypopnea during sleep. Insufficient breathing due to apnea or hypopnea causes oxygen levels to fall and carbon dioxide to rise. Because the airway is blocked, breathing faster or harder does not help to improve oxygen levels until the airway is reopened. Typically, the obstruction requires the person to awaken to activate the upper airway muscles.  Once the airway is opened, the person then takes several deep breaths to catch up on breathing. As the person awakens, he or she may move briefly, snort or snore, and take a deep breath. Less frequently, a person may awaken completely with a sensation of gasping, smothering, or choking. If the person falls back to sleep quickly, he or she will not remember the event. Many people with sleep apnea are unaware of their abnormal breathing in sleep, and all patients underestimate how often their sleep is interrupted. Awakening from sleep causes sleep to be unrefreshing and causes fatigue and daytime sleepiness. Anatomic causes of obstructive sleep apnea --  Most patients have PRASAD because of a small upper airway. As the bones of the face and skull develop, some people develop a small lower face, a small mouth, and a tongue that seems too large for the mouth. These features are genetically determined, which explains why PRASAD tends to cluster in families. Obesity is another major factor. Tonsil enlargement can be an important cause, especially in children. SLEEP APNEA SYMPTOMS -- The main symptoms of PRASAD are loud snoring, fatigue, and daytime sleepiness. However, some people have no symptoms. For example, if the person does not have a bed partner, he or she may not be aware of the snoring. Fatigue and sleepiness have many causes and are often attributed to overwork and increasing age. As a result, a person may be slow to recognize that they have a problem. A bed partner or spouse often prompts the patient to seek medical care. Other symptoms may include one or more of the following:  ?Restless sleep  ? Awakening with choking, gasping, or smothering  ? Morning headaches, dry mouth, or sore throat  ? Waking frequently to urinate  ? Awakening unrested, groggy  ? Low energy, difficulty concentrating, memory impairment    Risk factors -- Certain factors increase the risk of sleep apnea.   ?Increasing age - PRASAD occurs at all ages, but it is more common in middle and apnea. Testing is usually performed in a sleep laboratory. A full sleep study is called a polysomnogram. The polysomnogram measures the breathing effort and airflow, blood oxygen level, heart rate and rhythm, duration of the various stages of sleep, body position, and movement of the arms/legs. Home monitoring devices are available that can perform a sleep study. This is a reasonable alternative to conventional testing in a sleep laboratory if the clinician strongly suspects moderate or severe sleep apnea and the patient does not have other illnesses or sleep disorders that may interfere with the results. SLEEP APNEA TREATMENT -- Sleep apnea is best treated by a knowledgeable sleep medicine specialist. The goal of treatment is to maintain an open airway during sleep. Effective treatment will eliminate the symptoms of sleep disturbance; long-term health consequences are also reduced. Most treatments require nightly use. The challenge for the clinician and the patient is to select an effective therapy that is appropriate for the patient's problem and that is acceptable for long term use. Auto-titrating CPAP delivers an amount of PAP that varies during the night. The variation is dependent on event detection software algorithms, which will increase the pressure gradually in response to flow changes until adequate patency is detected. After a period of sustained upper airway patency, the delivered level of pressure gradually decreases until the algorithm identifies recurrent upper airway obstruction, at which point the delivered pressure again increases. The result is that the delivered pressure varies throughout the night, in an effort to provide the lowest pressure that is necessary to maintain upper airway patency. Continuous positive airway pressure (CPAP) -- The most effective treatment for sleep apnea uses air pressure from a mechanical device to keep the upper airway open during sleep.  A CPAP (continuous positive airway pressure)  device uses an air-tight attachment to the nose, typically a mask, connected to a tube and a blower which generates the pressure. Devices that fit comfortably into the nasal opening, rather than over the nose, are also available. CPAP should be used any time the person sleeps (day or night). The CPAP device is usually used for the first time in the sleep lab, where a technician can adjust the pressure and select the best equipment to keep the airway open. Alternatively, an auto device with a self-adjusting pressure feature, provided with proper education and training, can get treatment started without another sleep test. While the treatment may seem uncomfortable, noisy, or bulky at first, most people accept the treatment after experiencing better sleep. However, difficulty with mask comfort and nasal congestion prevent up to 50 percent of people from using the treatment on a regular basis. Continued follow up with a healthcare provider helps to ensure that the treatment is effective and comfortable. Information from the CPAP machine is often used by physicians, therapists, and insurers to track the success of treatment. CPAP can be delivered with different features to improve comfort and solve problems that may come up during treatment. Changes in treatment may be needed if symptoms do not improve or if the persons condition changes, such as a gain or loss of weight. Adjust sleep position -- Adjusting sleep position (to stay off the back) may help improve sleep quality in people who have PRASAD when sleeping on the back. However, this is difficult to maintain throughout the night and is rarely an adequate solution. Weight loss -- Weight loss may be helpful for obese or overweight patients. Weight loss may be accomplished with dietary changes, exercise, and/or surgical treatment.  However, it can be difficult to maintain weight loss; the five-year success of non-surgical weight loss is only 5 percent, meaning that 95 percent of people regain lost weight. Avoid alcohol and other sedatives -- Alcohol can worsen sleepiness, potentially increasing the risk of accidents or injury. People with PRASAD are often counseled to drink little to no alcohol, even during the daytime. Similarly, people who take anti-anxiety medications or sedatives to sleep should speak with their healthcare provider about the safety of these medications. People with PRASAD must notify all healthcare providers, including surgeons, about their condition and the potential risks of being sedated. People with PRASAD who are given anesthesia and/or pain medications require special management and close monitoring to reduce the risk of a blocked airway. Dental devices -- A dental device, called an oral appliance or mandibular advancement device, can reposition the jaw (mandible), bringing the tongue and soft palate forward as well. This may relieve obstruction in some people. This treatment is excellent for reducing snoring, although the effect on PRASAD is sometimes more limited. As a result, dental devices are best used for mild cases of PRASAD when relief of snoring is the main goal. Failure to tolerate and accept CPAP is another indication for dental devices. While dental devices are not as effective as CPAP for PRASAD, some patients prefer a dental device to CPAP. Side effects of dental devices are generally minor but may include changes to the bite with prolonged use. Surgical treatment -- Surgery is an alternative therapy for patients who cannot tolerate or do not improve with nonsurgical treatments such as CPAP or oral devices. Surgery can also be used in combination with other nonsurgical treatments. Surgical procedures reshape structures in the upper airways or surgically reposition bone or soft tissue. Uvulopalatopharyngoplasty (UPPP) removes the uvula and excessive tissue in the throat, including the tonsils, if present. Other procedures, such as maxillomandibular advancement (MMA), address both the upper and lower pharyngeal airway more globally. UPPP alone has limited success rates (less than 50 percent) and people can relapse (when PRASAD symptoms return after surgery). As a result, this surgery is only recommended in a minority of people and should be considered with caution. MMA may have a higher success rate, particularly in people with abnormal jaw (maxilla and mandible) anatomy, but it is the most complicated procedure. A newer surgical approach, nerve stimulation to protrude the tongue, has promising success rates in very selected people. Tracheostomy creates a permanent opening in the neck. It is reserved for people with severe disease in whom less drastic measures have failed or are inappropriate. Although it is always successful in eliminating obstructive sleep apnea, tracheostomy requires significant lifestyle changes and carries some serious risks (eg, infection, bleeding, blockage). All surgical treatments require discussions about the goals of treatment, the expected outcomes, and potential complications. Hypoglossal nerve stimulator- \"Inspire\" device    PAP treatment failure:  Possible causes of treatment failure include nonadherence or suboptimal adherence, weight gain, an inappropriate level of prescribed positive pressure, or an additional disorder causing sleepiness (eg, narcolepsy) that may require alterations in the therapeutic regimen. A review of medications should also be undertaken since many drugs may lead to sleepiness. Inadequate sleep time may also negate the expected effects from treatment of PRASAD. Also, pt's can have persistent hypersomnolence associated with sleep apnea even in the presence of adequate therapy and at those times Provigil or Nuvigil or other stimulants may be indicated.     Once the patient's positive airway pressure therapy has been optimized and symptoms resolved, a regimen of long-term follow-up should be established. Annual visits are reasonable, with more frequent visits in between if new issues arise. The purpose of long-term follow-up is to assess usage and monitor for recurrent PRASAD, new side effects, air leakage, and fluctuations in body weight. WHERE TO GET MORE INFORMATION -- Your healthcare provider is the best source of information for questions and concerns related to your medical problem. Organizations  American Sleep Apnea Association  Provides information about sleep apnea to the public, publishes a newsletter, and serves as an advocate for people with the disorder. Yefrifabrizio, [address]   Daryl@[employer]. org   [employer].. org   Tel: [phone]   Fax: [facility] organization that works to [employer] and safety by promoting public understanding of sleep and sleep disorders. Supports sleep-related education, research, and advocacy; produces and distributes educational materials to the public and healthcare professionals; and offers postdoctoral fellowships and grants for sleep researchers. Farrah [address]   [email or web address]   [email or web address]. [email or web address]   Tel: [phone]   Fax: [phone]    Important information:  Medicare/private insurance CPAP/BiPAP/APAP requirements:  Medicare/private insurance has specific requirements for PAP compliance that must be met during the first 90 days of use to continue coverage for CPAP/BiPAP/APAP  from day 91 and beyond. The policy requires that patients use a PAP device 4 hours per 24 hour period, at least 70% of the time over a 30 day period. This data must be downloaded as a report direct from the PAP devices. This is called a compliance download. Your PAP supplier will assist you in this matter.      Reminder:  Please bring a copy of the compliance download to your next office visit or have your supplier fax it to our office prior to your office visit. Note:  Where applicable, we will utilize PAP device efficiency reports, additional testing, and face-to-face  clinical evaluation subsequent to any treatment, changes in treatment, and continued treatment. Caution:  Please abstain from driving or engaging in other activities which may be hazardous in the presence of diminished alertness or daytime drowsiness. And avoid the use of sedatives or alcohol, which can worsen sleep apnea and daytime drowsiness. Mask suggestions:  -     Resmed Airfit N20 (Nasal) or F20 (Full face mask). They conform to your face, thus decreasing the potential for mask leakage. You might like the AirTouch F20(full face mask). It has a \"memory foam\" like cushion. The AirFit F30 is a smaller style full face mask designed to sit low on and cover less of your face for fewer facial marks. AirFit N30i has a top of the head tube with a nasal mask. AirFit P10 reported to be the most comfortable nasal pillow mask. Resmed Mirage FX reported to be the most comfortable nasal mask. Resmed Mirage Quay reported to be the most comfortable hybrid mask. AirTouch N20-memory foam nasal mask. Respironics: You might also like to try a nasal mask called a Dreamwear nasal mask or the Dreamwear nasal pillow. Another suggestion is the [facility], it is a minimal contact full face mask. The Bernbaldomero Linn incredible under the nose design makes it the only full face mask that won't cause red marks on the bridge of your nose when compared to other full face masks. The Dreamwear full face mask has a  soft feel, unique in-frame air-flow, and innovative air tube connection at the top of the head for the ultimate in sleep comfort. Comfort Gel Blue. Dreamwear gel pillows.     Gerber & Eliud: Dominique Hoyos nasal pillow mask and Simplus FFM    The use of a memory foam CPAP pillow supports the head and neck throughout the night.

## 2020-11-09 NOTE — PROGRESS NOTES
[facility]  [address] [place] [address] Drive, [address]  [facility], [place] [address]  Phone [phone]  Fax [phone]       [facility] Sleep Follow Up Encounter      Information:   Patient Name: Mae Joel  :   1955  Age:   77 y.o. MRN:   [id number]  Account #:  [de-identified]  Today:                20    Provider:  Navi Brewer PA-C    Chief Complaint   Patient presents with    1 Year Follow Up     obstructive sleep apnea         Subjective:   Mae Joel is a 77 y.o. female  with a history of severe PRASAD who comes in for an annual sleep clinic follow up. The PSG, 2009 revealed an AHI of 44.5. She is prescribed CPAP at 11cm of pressure. The compliance report indicates that she is averaging 6.5 hours of CPAP use per day.  She reports that consistent CPAP use has alleviated the previous PRASAD symptoms.     Location or symptom:  PRASAD  Onset:  PS2009  Timing:  q hs  Severity:  Severe  Associated:  Snoring, witnessed apneas, and excessive daytime somnolence  Alleviated:  CPAP      Objective:     Past Medical History:   Diagnosis Date    Anxiety     Asymptomatic postmenopausal status (age-related) (natural)     CPAP (continuous positive airway pressure) dependence     11cm    Depression     Headaches due to old head injury     Hyperlipidemia     Hypertension     Joint pain     knee and shoulder    Macular degeneration disease     beginning stage    Neoplasm of uncertain behavior of skin     Obstructive sleep apnea     AHI:  44.5 per PSG, 2009    Ringing in ears     Seasonal allergies     Unspecified sleep apnea     uses c-pap    Vitamin D deficiency        Past Surgical History:   Procedure Laterality Date    COLONOSCOPY  10 years    [place]-normal 10 yr recall    DILATION AND CURETTAGE OF UTERUS      MA COLONOSCOPY FLX DX W/COLLJ SPEC WHEN PFRMD N/A 2018    Dr Omalley-incomplete    MA COLONOSCOPY FLX DX W/COLLJ SPEC WHEN PFRMD N/A 2018    Dr Marciano Weiss, 5 yr Denies all unless marked  HENT:[]? Headache  []? Head Injury  []? Sore Throat  []? Ear Pain  []? Dizziness []? Hearing Loss   [x]? Denies all unless marked  Spine:  []? Neck pain  []? Back pain  []? Sciaticia  [x]? Denies all unless marked  Cardiovascular:[]? Chest Pain []? Palpitations []? Heart Disease  [x]? Denies all unless marked  Pulmonary: []? Shortness of Breath []? Cough   [x]? Denies all unless marked  Gastrointestinal:  []? Abdominal Pain  []? Blood in Stool  []? Diarrhea []? Constipation []? Nausea  []? Vomiting  [x]? Denies all unless marked  Genitourinary:  []? Dysuria []? Frequency  []? Incontinence []? Urgency   [x]? Denies all unless marked  Musculoskeletal: []? Arthralgia  []? Myalgias []? Muscle cramps  []? Muscle twitches   [x]? Denies all unless marked   Extremities:   []? Pain   []? Swelling   [x]? Denies all unless marked  Skin:[]? Rash  []? Color Change  [x]? Denies all unless marked  Neurological:[]? Visual Disturbance []? Double Vision []? Slurred Speech []? Trouble swallowing  []? Vertigo []? Tingling []? Numbness []? Weakness []? Loss of Balance   []? Loss of Consciousness []? Memory Loss []? Seizures  [x]? Denies all unless marked  Psychiatric/Behavioral:[]? Depression []? Anxiety  [x]? Denies all unless marked  Sleep: []? Insomnia []? Sleep Disturbance []? Snoring []? Restless Legs []? Daytime Sleepiness [x]? Sleep Apnea  []? Denies all unless marked    The MA has completed the ROS with the patient. I have reviewed it in its' entirety with the patient and agree with the documentation.      PHYSICAL EXAM  /61 (Site: Right Upper Arm, Position: Sitting, Cuff Size: Medium Adult)   Pulse 86   Temp 97.5 °F (36.4 °C)   Ht 5' 7\" (1.702 m)   Wt 170 lb (77.1 kg)   SpO2 98%   BMI 26.63 kg/m²      Constitutional -  Alert in NAD, well developed, pleasant and cooperative with exam; body habitus overweight as indicated by BMI  HEENT- Conjunctiva normal.  No scars, masses, or lesions over external nose or ears, hearing intact, no neck masses noted, no jugular vein distension, no bruit  Cardiac- Regular rate and rhythm  Pulmonary- Clear to auscultation, good expansion, normal effort without use of accessory muscles  Musculoskeletal - No significant wasting of muscles noted, no bony deformities  Extremities - No clubbing, cyanosis or edema  Skin - Warm, dry, and intact. No rash, erythema, or pallor  Psychiatric - Mood, affect, and behavior appear normal      Neurologic:  Extraocular movements are intact without nystagmus. PERRL. Visual fields are full to confrontation. Facial movements are symmetrical and normal.  Speech is precise. Extremity strength is normal in both uppers and lowers. Deep tendon reflexes are intact and symmetrical.  Rapid alternating movements are unimpaired. Finger-to-nose testing is performed well, without dysmetria. Gait is normal.    I reviewed the following studies:       []  :  Clinical laboratory test results     []  :  Radiology reports                    [x]  :  Review and summarization of medical records and/or obtain medical records        []  :  Previous/recent polysomnogram report(s)     []  :  [place] Sleepiness Scale       [x]  :  Compliance download: The CPAP is set at a pressure of 11cm. Compliance download shows that she uses device: 100% of the time;  percentage of days with usage >=4 hours: 100%. AHI: 3.3 (leaks noted)-discussed    Assessment:       ICD-10-CM    1. Obstructive sleep apnea  G47.33    2. CPAP (continuous positive airway pressure) dependence  Z99.89           []  :  Stable     []  :  Improved                       [x]  :  Well controlled              []  :  Resolving     []  :  Resolved     []  :  Inadequately controlled     []  :  Worsening     []  :  Additional workup planned    Patient is compliant and benefiting from therapy as indicated by compliance evaluation and patient report.       Plan:     No orders of the defined types were placed in this encounter. 1.   Patient advised of the etiology,  pathophysiology, diagnosis, treatment options, and risks of untreated PRASAD. Risks may include, but are not limited to  hypertension, coronary artery disease, diabetes, stroke, weight gain, impaired cognition, daytime somnolence, and motor vehicle accidents. Advised to abstain from driving or operating heavy machinery when drowsy and the use of respiratory suppressants. Discussed diagnostic studies and potential treatment plan. Will evaluate for clinical benefit and and compliance during a 30 day period within the preceding 90 days. 2.  The following educational material has been included in this visit after visit summary for your review: PRASAD/PAP guidelines-Discussed with the patient and all questions fully answered. 3.  Continue CPAP  4. Follow up in 1 year      Note:  A total of >50% (>8 minutes) of 15 minutes was spent discussing the pathophysiology and treatment and/or coordination of care of the above diagnoses.

## 2020-11-23 ENCOUNTER — VIRTUAL VISIT (OUTPATIENT)
Dept: VASCULAR SURGERY | Age: 65
End: 2020-11-23
Payer: MEDICARE

## 2020-11-23 PROCEDURE — 99213 OFFICE O/P EST LOW 20 MIN: CPT | Performed by: NURSE PRACTITIONER

## 2020-11-23 PROCEDURE — G8484 FLU IMMUNIZE NO ADMIN: HCPCS | Performed by: NURSE PRACTITIONER

## 2020-11-23 PROCEDURE — 1090F PRES/ABSN URINE INCON ASSESS: CPT | Performed by: NURSE PRACTITIONER

## 2020-11-23 PROCEDURE — 4040F PNEUMOC VAC/ADMIN/RCVD: CPT | Performed by: NURSE PRACTITIONER

## 2020-11-23 PROCEDURE — 1123F ACP DISCUSS/DSCN MKR DOCD: CPT | Performed by: NURSE PRACTITIONER

## 2020-11-23 PROCEDURE — G8399 PT W/DXA RESULTS DOCUMENT: HCPCS | Performed by: NURSE PRACTITIONER

## 2020-11-23 PROCEDURE — G8427 DOCREV CUR MEDS BY ELIG CLIN: HCPCS | Performed by: NURSE PRACTITIONER

## 2020-11-23 PROCEDURE — 3017F COLORECTAL CA SCREEN DOC REV: CPT | Performed by: NURSE PRACTITIONER

## 2020-11-23 PROCEDURE — 1036F TOBACCO NON-USER: CPT | Performed by: NURSE PRACTITIONER

## 2020-11-23 PROCEDURE — G8417 CALC BMI ABV UP PARAM F/U: HCPCS | Performed by: NURSE PRACTITIONER

## 2020-11-23 NOTE — PROGRESS NOTES
2020    TELEHEALTH EVALUATION -- Audio/Visual (During [facility] public [facility] emergency)    HPI:    Patient is located at home  Provider is located at [facility]   Also present during call is jordan Smith (:  1955) has requested an audio/video evaluation for the following concern(s):    She has a known history of of varicose veins, spider veins and leg swelling. She reports prominent veins on the  Left leg(s), lower extremity edema on the  Left leg(s), the veins are painful -- severity:  moderate and pain is aggravated by upright posture. She reports previous treatment includes none. She does not have a history of spontaneous rupture. Differential diagnosis for leg pain includes but is not limited to: varicose veins, peripheral vascular disease, arthritic pain, DVT, lumbar disc disease, and neurogenic pain. Prior to Visit Medications    Medication Sig Taking? Authorizing Provider   venlafaxine (EFFEXOR XR) 150 MG extended release capsule Take 1 capsule by mouth daily Yes Andre Augustine MD   lisinopril (PRINIVIL;ZESTRIL) 10 MG tablet Take 1 tablet by mouth once daily Yes Andre Augustine MD   montelukast (SINGULAIR) 10 MG tablet Take 1 tablet by mouth nightly Yes Andre Augustine MD   clotrimazole-betamethasone (LOTRISONE) 1-0.05 % cream Apply topically 2 times daily. Yes HE Abad   Multiple Vitamins-Minerals (OCUVITE ADULT 50+ PO) Take by mouth daily Yes Historical Provider, MD   zoster recombinant adjuvanted vaccine (SHINGRIX) 50 MCG/0.5ML SUSR injection Inject 0.5 mLs into the muscle See Admin Instructions 1 dose now and repeat in 2-6 months Yes Andre Augustine MD   fexofenadine TY [facility] ALLERGY) 180 MG tablet Take 180 mg by mouth daily Yes Historical Provider, MD   Calcium-Vitamin D (CALTRATE 600 PLUS-VIT D PO) Take  by mouth 2 times daily. Yes Historical Provider, MD   therapeutic multivitamin-minerals (THERAGRAN-M) tablet Take 1 tablet by mouth daily.  Yes Historical Provider, MD       Social History     Tobacco Use    Smoking status: Never Smoker    Smokeless tobacco: Never Used   Substance Use Topics    Alcohol use: No    Drug use: No        Allergies   Allergen Reactions    Latex    ,   Past Medical History:   Diagnosis Date    Anxiety     Asymptomatic postmenopausal status (age-related) (natural)     CPAP (continuous positive airway pressure) dependence     11cm    Depression     Headaches due to old head injury     Hyperlipidemia     Hypertension     Joint pain     knee and shoulder    Macular degeneration disease     beginning stage    Neoplasm of uncertain behavior of skin     Obstructive sleep apnea     AHI:  44.5 per PSG, 2/2009    Ringing in ears     Seasonal allergies     Unspecified sleep apnea     uses c-pap    Vitamin D deficiency    ,   Past Surgical History:   Procedure Laterality Date    COLONOSCOPY  10 years    [place]-normal 10 yr recall    DILATION AND CURETTAGE OF UTERUS  2007    [place] COLONOSCOPY FLX DX W/COLLJ SPEC WHEN PFRMD N/A 8/16/2018    Dr Omalley-incomplete    NV COLONOSCOPY FLX DX W/COLLJ SPEC WHEN PFRMD N/A 8/17/2018    Dr Angelique Hastings, 5 yr recall   ,   Social History     Tobacco Use    Smoking status: Never Smoker    Smokeless tobacco: Never Used   Substance Use Topics    Alcohol use: No    Drug use: No   ,   Family History   Problem Relation Age of Onset    Stroke Father     Hypertension Father     Other Father         tumor removal, [facility] Stroke Mother     Hypertension Mother     Macular Degen Mother     Other Maternal Grandmother         hodgkins    Other Maternal Grandfather         leukemia    Cancer Paternal Grandmother 80        pancreatic cancer    Heart Disease Paternal Cousin         paternal side of family    Cancer Paternal Cousin         colon   ,   Health Maintenance   Topic Date Due    Shingles Vaccine (2 of 3) 06/10/2013    Annual Wellness Visit (AWV)  04/07/2021    Vitals/Constitutional/EENT/Resp/CV/GI//MS/Neuro/Skin/Heme-Lymph-Imm. Constitutional - well developed, well nourished. No diaphoresis or acute distress. HENT - head normocephalic. Right external ear canal appears normal.  Left external ear canal appears normal.  Septum appears midline. Eyes - conjunctiva normal.   No exudate. No icterus. Neck- ROM appears normal, no tracheal deviation. Extremities -No cyanosis, clubbing, has edema. No signs atheroembolic event. Pulmonary - effort appears normal.  No respiratory distress. No accessory muscle use  Musculoskeletal -   Motor grossly intact in visible lower extremities and upper extremities  Neurologic - alert and oriented X 3. Cranial Nerves II-XII grossly intact  Skin - intact. No rash, erythema, or pallor. Left lateral reticular complex and varicose and behind knee and left thigh medial; right leg few spider veins. Psychiatric - mood, affect, and behavior appear normal.  Judgment and thought processes appear normal.    ASSESSMENT/PLAN:  1. Leg swelling    2. Venous insufficiency    3. Leg pain, right    4. Leg pain, left    5. Varicose veins of both lower extremities with pain        No follow-ups on file. Support hose  20-30 mmHg compression  Start/Continue ASA EC 81 mg daily  Leg elevation  Good moisturization  Good skin care  Follow up in  12 Week(s) with venous scan for reflux  Diet   excercise    Strongly encouraged start/continue statin therapy  Recommended no smoking  An  electronic signature was used to authenticate this note.     --HE Suarez on 11/25/2020 at 1:17 PM        Pursuant to the emergency declaration under the [facility] waiver authority and the [employer] Act, this Virtual  Visit was conducted, with patient's consent, to reduce the patient's risk of exposure to COVID-19 and provide continuity of care for an established patient. Services were provided through a video synchronous discussion virtually to substitute for in-person clinic visit.

## 2020-11-30 ENCOUNTER — PROCEDURE VISIT (OUTPATIENT)
Dept: OTOLARYNGOLOGY | Age: 65
End: 2020-11-30
Payer: MEDICARE

## 2020-11-30 PROCEDURE — 92552 PURE TONE AUDIOMETRY AIR: CPT | Performed by: AUDIOLOGIST

## 2020-11-30 NOTE — PROGRESS NOTES
History   Mae Joel is a 77 y.o. female who presented to the clinic this date for a recheck of hearing due to left asymmetrical hearing loss. She denied problems or concerns since last visit. Summary   Pure tone testing indicates moderate high frequency SNHL bilaterally. When compared to audiogram obtained in May, thresholds remained stable bilaterally. Results   Otoscopy:    Right: Clear EAC/Normal TM   Left: Clear EAC/Normal TM    Audiometry:    Right: Moderate high frequency SNHL   Left: Moderate high frequency SNHL         Plan   Results of today's testing were discussed with Ms. Jenny Mak and the following recommendations were made:    1. Monitor hearing yearly, sooner with changes. 2. May consider hearing aid evaluation if hearing loss causes communication difficulties. 3. Hearing protection as warranted.         Audiogram and Acoustic Immittance

## 2020-12-01 RX ORDER — VENLAFAXINE HYDROCHLORIDE 150 MG/1
150 CAPSULE, EXTENDED RELEASE ORAL DAILY
Qty: 90 CAPSULE | Refills: 0 | Status: SHIPPED | OUTPATIENT
Start: 2020-12-01 | End: 2021-05-06 | Stop reason: SDUPTHER

## 2020-12-29 RX ORDER — MONTELUKAST SODIUM 10 MG/1
10 TABLET ORAL NIGHTLY
Qty: 90 TABLET | Refills: 0 | Status: SHIPPED | OUTPATIENT
Start: 2020-12-29 | End: 2021-04-06 | Stop reason: SDUPTHER

## 2021-01-29 DIAGNOSIS — B36.9 FUNGAL SKIN INFECTION: ICD-10-CM

## 2021-01-29 RX ORDER — CLOTRIMAZOLE AND BETAMETHASONE DIPROPIONATE 10; .64 MG/G; MG/G
CREAM TOPICAL
Qty: 15 G | Refills: 0 | Status: SHIPPED | OUTPATIENT
Start: 2021-01-29

## 2021-03-05 ENCOUNTER — HOSPITAL ENCOUNTER (OUTPATIENT)
Dept: VASCULAR LAB | Age: 66
Discharge: HOME | End: 2021-03-05
Payer: MEDICARE

## 2021-03-05 DIAGNOSIS — I87.2 VENOUS INSUFFICIENCY: ICD-10-CM

## 2021-03-05 DIAGNOSIS — M79.605 LEG PAIN, LEFT: ICD-10-CM

## 2021-03-05 DIAGNOSIS — M79.89 LEG SWELLING: ICD-10-CM

## 2021-03-05 DIAGNOSIS — M79.604 LEG PAIN, RIGHT: ICD-10-CM

## 2021-03-05 DIAGNOSIS — I83.813 VARICOSE VEINS OF BOTH LOWER EXTREMITIES WITH PAIN: ICD-10-CM

## 2021-03-05 PROCEDURE — 93970 EXTREMITY STUDY: CPT

## 2021-03-08 ENCOUNTER — VIRTUAL VISIT (OUTPATIENT)
Dept: VASCULAR SURGERY | Age: 66
End: 2021-03-08
Payer: MEDICARE

## 2021-03-08 DIAGNOSIS — I87.2 VENOUS INSUFFICIENCY: Primary | ICD-10-CM

## 2021-03-08 PROCEDURE — 99213 OFFICE O/P EST LOW 20 MIN: CPT | Performed by: NURSE PRACTITIONER

## 2021-03-08 PROCEDURE — G8427 DOCREV CUR MEDS BY ELIG CLIN: HCPCS | Performed by: NURSE PRACTITIONER

## 2021-03-08 PROCEDURE — 1036F TOBACCO NON-USER: CPT | Performed by: NURSE PRACTITIONER

## 2021-03-08 PROCEDURE — 1090F PRES/ABSN URINE INCON ASSESS: CPT | Performed by: NURSE PRACTITIONER

## 2021-03-08 PROCEDURE — G8417 CALC BMI ABV UP PARAM F/U: HCPCS | Performed by: NURSE PRACTITIONER

## 2021-03-08 PROCEDURE — 4040F PNEUMOC VAC/ADMIN/RCVD: CPT | Performed by: NURSE PRACTITIONER

## 2021-03-08 PROCEDURE — G8399 PT W/DXA RESULTS DOCUMENT: HCPCS | Performed by: NURSE PRACTITIONER

## 2021-03-08 PROCEDURE — 3017F COLORECTAL CA SCREEN DOC REV: CPT | Performed by: NURSE PRACTITIONER

## 2021-03-08 PROCEDURE — G8484 FLU IMMUNIZE NO ADMIN: HCPCS | Performed by: NURSE PRACTITIONER

## 2021-03-08 PROCEDURE — 1123F ACP DISCUSS/DSCN MKR DOCD: CPT | Performed by: NURSE PRACTITIONER

## 2021-03-08 NOTE — PROGRESS NOTES
Lynne Hinds (:  1955) is a 74 y.o. female,Established patient, here for evaluation of the following chief complaint(s): Follow-up          SUBJECTIVE/OBJECTIVE:    She has a known history of of varicose veins and chronic venous insufficiency. She reports prominent veins on the  Bilateral leg(s), lower extremity edema on the  Bilateral leg(s), the veins are painful -- severity:  moderate and pain is aggravated by upright posture. She reports previous treatment includes prescriptions strength hose for greater than 3 months. She does not have a history of spontaneous rupture. Differential diagnosis for leg pain includes but is not limited to: varicose veins, peripheral vascular disease, arthritic pain, DVT, lumbar disc disease, and neurogenic pain.       Yoandy Gil is a 74 y.o. female with the following history as recorded in [facility]:  Patient Active Problem List    Diagnosis Date Noted    Varicose veins of both lower extremities with pain 10/22/2020    Sensorineural hearing loss (SNHL) of both ears 2020    Osteopenia 2018    History of colon polyps 2018    Menopause 2018    Sciatica of left side 2017    Essential (primary) hypertension 2017    Arthritis 2017    Depression with anxiety 2017    Allergic rhinitis 2017    Vitamin D deficiency 2017    Obstructive sleep apnea     CPAP (continuous positive airway pressure) dependence     Sleep apnea 2013    Hyperlipidemia 2013     Current Outpatient Medications   Medication Sig Dispense Refill    clotrimazole-betamethasone (LOTRISONE) 1-0.05 % cream APPLY  CREAM TOPICALLY TWICE DAILY 15 g 0    montelukast (SINGULAIR) 10 MG tablet Take 1 tablet by mouth nightly 90 tablet 0    venlafaxine (EFFEXOR XR) 150 MG extended release capsule Take 1 capsule by mouth daily 90 capsule 0    lisinopril (PRINIVIL;ZESTRIL) 10 MG tablet Take 1 tablet by mouth once daily 90 tablet 2  Multiple Vitamins-Minerals (OCUVITE ADULT 50+ PO) Take by mouth daily      zoster recombinant adjuvanted vaccine (SHINGRIX) 50 MCG/0.5ML SUSR injection Inject 0.5 mLs into the muscle See Admin Instructions 1 dose now and repeat in 2-6 months 1 each 1    fexofenadine (ALLEGRA ALLERGY) 180 MG tablet Take 180 mg by mouth daily      Calcium-Vitamin D (CALTRATE 600 PLUS-VIT D PO) Take  by mouth 2 times daily.  therapeutic multivitamin-minerals (THERAGRAN-M) tablet Take 1 tablet by mouth daily. No current facility-administered medications for this visit.       Allergies: Latex  Past Medical History:   Diagnosis Date    Anxiety     Asymptomatic postmenopausal status (age-related) (natural)     CPAP (continuous positive airway pressure) dependence     11cm    Depression     Headaches due to old head injury     Hyperlipidemia     Hypertension     Joint pain     knee and shoulder    Macular degeneration disease     beginning stage    Neoplasm of uncertain behavior of skin     Obstructive sleep apnea     AHI:  44.5 per PSG, 2/2009    Ringing in ears     Seasonal allergies     Unspecified sleep apnea     uses c-pap    Vitamin D deficiency      Past Surgical History:   Procedure Laterality Date    COLONOSCOPY  10 years    [place]-normal 10 yr recall    DILATION AND CURETTAGE OF UTERUS  2007    [place] COLONOSCOPY FLX DX W/COLLJ SPEC WHEN PFRMD N/A 8/16/2018    Dr Marck Lopes FLX DX W/COLLJ SPEC WHEN PFRMD N/A 8/17/2018    Dr Zoila iMchaels, 5 yr recall     Family History   Problem Relation Age of Onset    Stroke Father     Hypertension Father     Other Father         [facility], [facility]   [facility] St[facility] Mother     Hypertension Mother     Yevgeniy Daunt Mother     Other Maternal Grandmother         hodgkins    Other Maternal Grandfather         leukemia    Cancer Paternal Grandmother 80        pancreatic cancer    Heart Disease Paternal Cousin         paternal side of family    Cancer Paternal Cousin         colon     Social History     Tobacco Use    Smoking status: Never Smoker    Smokeless tobacco: Never Used   Substance Use Topics    Alcohol use: No       ROS  Eyes  no sudden vision change or amaurosis. Respiratory  no significant shortness of breath,  Cardiovascular  no chest pain or syncope. No  significant leg swelling. No claudication. Musculoskeletal  no gait disturbance  Skin  no new wound. Neurologic   No speech difficulty or lateralizing weakness. All other review of systems are negative. No flowsheet data found. Physical Exam    Due to this being a TeleHealth encounter, evaluation of the following organ systems is limited: Vitals/Constitutional/EENT/Resp/CV/GI//MS/Neuro/Skin/Heme-Lymph-Imm. Constitutional  well developed, well nourished. No diaphoresis or acute distress. Neck- ROM appears normal  Extremities -No cyanosis, clubbing, has edema. No signs atheroembolic event. Left lateral reticular complex and varicose vein behind knee and left medial thigh; right leg with spider veins  Pulmonary  effort appears normal.  No respiratory distress. No accessory muscle use  Neurologic  alert and oriented X 3. Cranial Nerves II-XII grossly intact  Skin  intact. No rash, erythema, or pallor. Psychiatric  mood, affect, and behavior appear normal.  Judgment and thought processes appear normal.    Risk factors for atherosclerosis of all vascular beds have been reviewed with the patient including:  Family history, tobacco abuse in all forms, elevated cholesterol, hyperlipidemia, and diabetes. Venous scan -   Right Side: The anterior branch greater saphenous vein exhibits reflux    lasting for a maximum of 1291 milliseconds in the saphenofemoral junction.    It disappears proximal/mid thigh.  There is no evidence of deep venous    thrombosis nor superficial thrombophlebitis in the segments insonated.    There is 1108 seconds of deep vein reflux. There is no short saphenous    vein reflux.        Left Side: The greater saphenous vein exhibits reflux lasting for a    maximum of 2758 milliseconds in the mid thigh. There is no evidence of    deep venous thrombosis nor superficial thrombophlebitis in the segments    insonated. There is no deep vein reflux. There is no short saphenous vein    reflux. Individual films reviewed: Yes. These results were reviewed with the patient. Disease process is stable          ASSESSMENT/PLAN:  1. Venous insufficiency    1. Venous insufficiency     stable and chronic    Support hose  20-30 mmHg compression  Start/Continue ASA EC 81 mg daily  Leg elevation  Good moisturization  Good skin care  Follow up if discomfort gets worse and we would rescan a that time to see if it progresses into  SFJ  Diet   yumiko Bond is a 74 y.o. female being evaluated by a Virtual Visit (video visit) encounter to address concerns as mentioned above. A caregiver was present when appropriate. Due to this being a TeleHealth encounter (During [facility] public health emergency), evaluation of the following organ systems was limited: Vitals/Constitutional/EENT/Resp/CV/GI//MS/Neuro/Skin/Heme-Lymph-Imm. Pursuant to the emergency declaration under the [facility] [address] [facility], [address] [place] [address] authority and the [employer] Act, this Virtual Visit was conducted with patient's (and/or legal guardian's) consent, to reduce the patient's risk of exposure to COVID-19 and provide necessary medical care. The patient (and/or legal guardian) has also been advised to contact this office for worsening conditions or problems, and seek emergency medical treatment and/or call 911 if deemed necessary.      Patient identification was verified at the start of the visit: Yes      Services were provided through a video synchronous discussion virtually to substitute for in-person clinic visit. Patient and provider were located at their individual homes. An electronic signature was used to authenticate this note.     --Yanelis Franz APRN

## 2021-04-06 ENCOUNTER — TELEPHONE (OUTPATIENT)
Dept: NEUROLOGY | Age: 66
End: 2021-04-06

## 2021-04-06 RX ORDER — MONTELUKAST SODIUM 10 MG/1
10 TABLET ORAL NIGHTLY
Qty: 90 TABLET | Refills: 1 | Status: SHIPPED | OUTPATIENT
Start: 2021-04-06 | End: 2021-10-04

## 2021-04-06 NOTE — TELEPHONE ENCOUNTER
Patient called stating she needs a prescription for sleep supplies sent to [facility] : Fax: [phone] or [phone] prescription needs to state: Obstructive Sleep Apnea or Central Sleep apnea. Also needs notes from last face-to-face sent as well.

## 2021-05-06 ENCOUNTER — OFFICE VISIT (OUTPATIENT)
Dept: FAMILY MEDICINE CLINIC | Age: 66
End: 2021-05-06
Payer: MEDICARE

## 2021-05-06 VITALS
DIASTOLIC BLOOD PRESSURE: 62 MMHG | HEIGHT: 67 IN | WEIGHT: 174 LBS | BODY MASS INDEX: 27.31 KG/M2 | OXYGEN SATURATION: 98 % | TEMPERATURE: 97.5 F | SYSTOLIC BLOOD PRESSURE: 112 MMHG | HEART RATE: 80 BPM

## 2021-05-06 DIAGNOSIS — E78.2 MIXED HYPERLIPIDEMIA: ICD-10-CM

## 2021-05-06 DIAGNOSIS — F41.8 DEPRESSION WITH ANXIETY: ICD-10-CM

## 2021-05-06 DIAGNOSIS — Z00.00 MEDICARE ANNUAL WELLNESS VISIT, SUBSEQUENT: Primary | ICD-10-CM

## 2021-05-06 DIAGNOSIS — E55.9 VITAMIN D DEFICIENCY: ICD-10-CM

## 2021-05-06 DIAGNOSIS — I10 ESSENTIAL (PRIMARY) HYPERTENSION: ICD-10-CM

## 2021-05-06 DIAGNOSIS — Z12.31 ENCOUNTER FOR SCREENING MAMMOGRAM FOR BREAST CANCER: ICD-10-CM

## 2021-05-06 DIAGNOSIS — M54.32 SCIATICA OF LEFT SIDE: ICD-10-CM

## 2021-05-06 DIAGNOSIS — Z00.00 ROUTINE GENERAL MEDICAL EXAMINATION AT A HEALTH CARE FACILITY: ICD-10-CM

## 2021-05-06 DIAGNOSIS — Z78.0 MENOPAUSE: ICD-10-CM

## 2021-05-06 DIAGNOSIS — M85.80 OSTEOPENIA, UNSPECIFIED LOCATION: ICD-10-CM

## 2021-05-06 PROCEDURE — G8399 PT W/DXA RESULTS DOCUMENT: HCPCS | Performed by: FAMILY MEDICINE

## 2021-05-06 PROCEDURE — 1123F ACP DISCUSS/DSCN MKR DOCD: CPT | Performed by: FAMILY MEDICINE

## 2021-05-06 PROCEDURE — 3017F COLORECTAL CA SCREEN DOC REV: CPT | Performed by: FAMILY MEDICINE

## 2021-05-06 PROCEDURE — 4040F PNEUMOC VAC/ADMIN/RCVD: CPT | Performed by: FAMILY MEDICINE

## 2021-05-06 PROCEDURE — 99213 OFFICE O/P EST LOW 20 MIN: CPT | Performed by: FAMILY MEDICINE

## 2021-05-06 PROCEDURE — G8427 DOCREV CUR MEDS BY ELIG CLIN: HCPCS | Performed by: FAMILY MEDICINE

## 2021-05-06 PROCEDURE — 1036F TOBACCO NON-USER: CPT | Performed by: FAMILY MEDICINE

## 2021-05-06 PROCEDURE — G0439 PPPS, SUBSEQ VISIT: HCPCS | Performed by: FAMILY MEDICINE

## 2021-05-06 PROCEDURE — 1090F PRES/ABSN URINE INCON ASSESS: CPT | Performed by: FAMILY MEDICINE

## 2021-05-06 PROCEDURE — G8417 CALC BMI ABV UP PARAM F/U: HCPCS | Performed by: FAMILY MEDICINE

## 2021-05-06 RX ORDER — VENLAFAXINE HYDROCHLORIDE 150 MG/1
150 CAPSULE, EXTENDED RELEASE ORAL DAILY
Qty: 90 CAPSULE | Refills: 3 | Status: SHIPPED | OUTPATIENT
Start: 2021-05-06 | End: 2022-06-09

## 2021-05-06 ASSESSMENT — PATIENT HEALTH QUESTIONNAIRE - PHQ9
SUM OF ALL RESPONSES TO PHQ QUESTIONS 1-9: 2
SUM OF ALL RESPONSES TO PHQ QUESTIONS 1-9: 2

## 2021-05-06 NOTE — PROGRESS NOTES
[facility] PHYSIC[facility]  [facility]  [address] [address] [facility] [address] [address]  Dept: [phone]  Dept Fax: [phone]  Loc: [phone]     Toya Can is a 74 y.o. female who presents today for her medical conditions/complaintsas noted below. Toya Can is c/o of Medicare AWV        HPI:   Patient is here for AWV and follow up. Labs due. Hypertension  Compliant with medications. No adverse effects from medication. No lightheadedness, palpitations, or chest pain. Anxiety  Compliant with medications. No adverse effects from medication. No panic or anxiety attacks since last visit. Symptoms are controlled. No excessive worry or insomnia. No issues with depression    She reports sciatica persistent on left side. She is taking naproxen as needed for back pain, takes 2 per day on average when back hurting.        HPI    Past Medical History:   Diagnosis Date    Anxiety     Asymptomatic postmenopausal status (age-related) (natural)     CPAP (continuous positive airway pressure) dependence     11cm    Depression     Headaches due to old head injury     Hyperlipidemia     Hypertension     Joint pain     knee and shoulder    Macular degeneration disease     beginning stage    Neoplasm of uncertain behavior of skin     Obstructive sleep apnea     AHI:  44.5 per PSG, 2/2009    Ringing in ears     Seasonal allergies     Unspecified sleep apnea     uses c-pap    Vitamin D deficiency      Past Surgical History:   Procedure Laterality Date    COLONOSCOPY  10 years    [place]-normal 10 yr recall    DILATION AND CURETTAGE OF UTERUS  2007    [place] COLONOSCOPY FLX DX W/COLLJ SPEC WHEN PFRMD N/A 8/16/2018    Dr Hermilo Garcia FLX DX W/COLLJ SPEC WHEN PFRMD N/A 8/17/2018    Dr Sergey Mcclain, 5 yr recall       Family History   Problem Relation Age of Onset    Stroke Father     Hypertension Father     Other Father         tumor removal, lower abd area    Stroke Mother     Hypertension Mother     Macular Degen Mother     Other Maternal Grandmother         hodgkins    Other Maternal Grandfather         leukemia    Cancer Paternal Grandmother 80        pancreatic cancer    Heart Disease Paternal Cousin         paternal side of family    Cancer Paternal Cousin         colon       Social History     Tobacco Use    Smoking status: Never Smoker    Smokeless tobacco: Never Used   Substance Use Topics    Alcohol use: No      Current Outpatient Medications   Medication Sig Dispense Refill    venlafaxine (EFFEXOR XR) 150 MG extended release capsule Take 1 capsule by mouth daily 90 capsule 3    montelukast (SINGULAIR) 10 MG tablet Take 1 tablet by mouth nightly 90 tablet 1    lisinopril (PRINIVIL;ZESTRIL) 10 MG tablet Take 1 tablet by mouth once daily 90 tablet 2    Multiple Vitamins-Minerals (OCUVITE ADULT 50+ PO) Take by mouth daily      fexofenadine (ALLEGRA ALLERGY) 180 MG tablet Take 180 mg by mouth daily      Calcium-Vitamin D (CALTRATE 600 PLUS-VIT D PO) Take  by mouth 2 times daily.  therapeutic multivitamin-minerals (THERAGRAN-M) tablet Take 1 tablet by mouth daily.  clotrimazole-betamethasone (LOTRISONE) 1-0.05 % cream APPLY  CREAM TOPICALLY TWICE DAILY (Patient not taking: Reported on 5/6/2021) 15 g 0    zoster recombinant adjuvanted vaccine (SHINGRIX) 50 MCG/0.5ML SUSR injection Inject 0.5 mLs into the muscle See Admin Instructions 1 dose now and repeat in 2-6 months 1 each 1     No current facility-administered medications for this visit.      Allergies   Allergen Reactions    Latex        Health Maintenance   Topic Date Due    Shingles Vaccine (2 of 3) 06/10/2013    Annual Wellness Visit (AWV)  04/07/2021    Potassium monitoring  10/22/2021    Creatinine monitoring  10/22/2021    Breast cancer screen  07/13/2022    DTaP/Tdap/Td vaccine (2 - Td) 07/15/2023    Colon cancer screen colonoscopy  08/17/2023  Lipid screen  03/16/2025    DEXA (modify frequency per FRAX score)  Completed    Flu vaccine  Completed    Pneumococcal 65+ years Vaccine  Completed    COVID-19 Vaccine  Completed    Hepatitis C screen  Completed    Hepatitis A vaccine  Aged Out    Hepatitis B vaccine  Aged Out    Hib vaccine  Aged Out    Meningococcal (ACWY) vaccine  Aged Out       Subjective:     Review of Systems   Constitutional: Negative for chills and fever. HENT: Negative for congestion. Respiratory: Negative for cough, chest tightness and shortness of breath. Cardiovascular: Negative for chest pain, palpitations and leg swelling. Gastrointestinal: Negative for abdominal pain, anal bleeding, constipation, diarrhea and nausea. Genitourinary: Negative for difficulty urinating. Psychiatric/Behavioral: Negative. See HPI for visit specific review of symptoms. All others negative      Objective:   /62   Pulse 80   Temp 97.5 °F (36.4 °C)   Ht 5' 7\" (1.702 m)   Wt 174 lb (78.9 kg)   SpO2 98%   BMI 27.25 kg/m²    Physical Exam  Constitutional:       Appearance: She is well-developed. She is not ill-appearing. Eyes:      Pupils: Pupils are equal, round, and reactive to light. Cardiovascular:      Rate and Rhythm: Normal rate and regular rhythm. Heart sounds: No murmur heard. No friction rub. Pulmonary:      Effort: Pulmonary effort is normal. No respiratory distress. Breath sounds: Normal breath sounds. No wheezing or rales. Abdominal:      General: Bowel sounds are normal. There is no distension. Palpations: Abdomen is soft. Tenderness: There is no abdominal tenderness. There is no guarding or rebound. Musculoskeletal:      Cervical back: Normal range of motion and neck supple. Neurological:      Mental Status: She is alert. Psychiatric:         Behavior: Behavior normal.           Lab Review   No results found for this or any previous visit (from the past 672 hour(s)). Assessment & Plan: The following diagnoses and conditions are stable withno further orders unless indicated:  Therese Quintero was seen today for medicare awv. Diagnoses and all orders for this visit:    Medicare annual wellness visit, subsequent  See other note. Essential (primary) hypertension  Blood pressure is stable. Continue current medications. Monitor ambulatory bp readings, if persistently >140/90, return to clinic. Osteopenia, unspecified location  Refer for DEXA. Vitamin D deficiency    Depression with anxiety  Stable,continue effexor. Mixed hyperlipidemia  Will follow. Sciatica of left side  Avoid heavy lifting. Stretching exercises encouraged. Other orders  -     venlafaxine (EFFEXOR XR) 150 MG extended release capsule; Take 1 capsule by mouth daily            Return in 6 months (on 11/6/2021). Discussed use, benefit, and side effects of prescribed medications. All patient questions answered. Pt voiced understanding. Reviewed health maintenance. Instructed to continue current medications, diet and exercise. Patient agreedwith treatment plan. Follow up as directed.

## 2021-05-06 NOTE — PROGRESS NOTES
Medicare Annual Wellness Visit  Name: Randal Snell Date: 2021   MRN: [id number] Sex: Female   Age: 74 y.o. Ethnicity: Non-/Non    : 1955 Race: White      Lynne Hinds is here for Medicare AWV    Screenings for behavioral, psychosocial and functional/safety risks, and cognitive dysfunction are all negative except as indicated below. These results, as well as other patient data from the [address] form, are documented in Flowsheets linked to this Encounter. Cscope UTD. Covid vaccine UTD. Other immunizations UTD. DEXA due this year. Mammogram due. Pap UTD. Allergies   Allergen Reactions    Latex          Prior to Visit Medications    Medication Sig Taking? Authorizing Provider   venlafaxine (EFFEXOR XR) 150 MG extended release capsule Take 1 capsule by mouth daily Yes Alex Escobedo MD   montelukast (SINGULAIR) 10 MG tablet Take 1 tablet by mouth nightly Yes Alex Escobedo MD   lisinopril (PRINIVIL;ZESTRIL) 10 MG tablet Take 1 tablet by mouth once daily Yes Alex Escobedo MD   Multiple Vitamins-Minerals (OCUVITE ADULT 50+ PO) Take by mouth daily Yes Historical Provider, MD   fexofenadine (ALLEGRA ALLERGY) 180 MG tablet Take 180 mg by mouth daily Yes Historical Provider, MD   Calcium-Vitamin D (CALTRATE 600 PLUS-VIT D PO) Take  by mouth 2 times daily. Yes Historical Provider, MD   therapeutic multivitamin-minerals (THERAGRAN-M) tablet Take 1 tablet by mouth daily.  Yes Historical Provider, MD   clotrimazole-betamethasone (Tiffany Marika) 1-0.05 % cream APPLY  CREAM TOPICALLY TWICE DAILY  Patient not taking: Reported on 2021  HE Mendoza   zoster recombinant adjuvanted vaccine [facility]) 50 MCG/0.5ML SUSR injection Inject 0.5 mLs into the muscle See Admin Instructions 1 dose now and repeat in 2-6 months  Alex Escobedo MD         Past Medical History:   Diagnosis Date    Anxiety     Asymptomatic postmenopausal status (age-related) (natural)     CPAP (continuous positive airway pressure) dependence     11cm    Depression     Headaches due to old head injury     Hyperlipidemia     Hypertension     Joint pain     knee and shoulder    Macular degeneration disease     beginning stage    Neoplasm of uncertain behavior of skin     Obstructive sleep apnea     AHI:  44.5 per PSG, 2/2009    Ringing in ears     Seasonal allergies     Unspecified sleep apnea     uses c-pap    Vitamin D deficiency        Past Surgical History:   Procedure Laterality Date    COLONOSCOPY  10 years    [place]-normal 10 yr recall    DILATION AND CURETTAGE OF UTERUS  2007    [place] COLONOSCOPY FLX DX W/COLLJ SPEC WHEN PFRMD N/A 8/16/2018    Dr Sanchez Baptise FLX DX W/COLLJ SPEC WHEN PFRMD N/A 8/17/2018    Dr Amie Merlos, 5 yr recall         Family History   Problem Relation Age of Onset    Stroke Father     Hypertension Father     Other Father         tumor removal, lower abd area   Rhys Concho Stroke Mother     Hypertension Mother    Lara Freitas Mother     Other Maternal Grandmother         hodgkins    Other Maternal Grandfather         leukemia    Cancer Paternal Grandmother 80        pancreatic cancer    Heart Disease Paternal Cousin         paternal side of family    Cancer Paternal Cousin         colon       CareTeam (Including outside providers/suppliers regularly involved in providing care):   Patient Care Team:  Alex Escobedo MD as PCP - General (Family Medicine)  Alex Escobedo MD as PCP - [facility] Empaneled Provider  Anderson Dominguez as Audiologist (Audiology)  Anderson Dominguez as Audiologist (Audiology)  RENAY Palomo (Physician Carlton Johnson)  Manasa Gaines MD as Consulting Physician (Vascular Surgery)    Wt Readings from Last 3 Encounters:   05/06/21 174 lb (78.9 kg)   11/09/20 170 lb (77.1 kg)   10/22/20 168 lb 6.4 oz (76.4 kg)     Vitals:    05/06/21 1018   BP: 112/62   Pulse: 80   Temp: 97.5 °F (36.4 °C)   SpO2: 98%   Weight: 174 lb (78.9 kg)   Height: 5' 7\" (1.702 m)     Body mass index is 27.25 kg/m². Based upon direct observation of the patient, evaluation of cognition reveals recent and remote memory intact. Patient's complete Health Risk Assessment and screening values have been reviewed and are found in Flowsheets. The following problems were reviewed today and where indicated follow up appointments were made and/or referrals ordered. Positive Risk Factor Screenings with Interventions:            General Health and ACP:  General  In general, how would you say your health is?: Fair  In the past 7 days, have you experienced any of the following?  New or Increased Pain, New or Increased Fatigue, Loneliness, Social Isolation, Stress or Anger?: (!) Stress, Anger  Do you get the social and emotional support that you need?: Yes  Do you have a Living Will?: Yes  Advance Directives     Power of  Living Will ACP-Advance Directive ACP-Power of     Not on File Not on File Not on File Not on File      General Health Risk Interventions:  · Stress: continue effexor    Health Habits/Nutrition:  Health Habits/Nutrition  Do you exercise for at least 20 minutes 2-3 times per week?: (!) No  Have you lost any weight without trying in the past 3 months?: No  Do you eat only one meal per day?: No  Have you seen the dentist within the past year?: Yes  Body mass index: (!) 27.25  Health Habits/Nutrition Interventions:  · Inadequate physical activity:  patient agrees to increase physical activity as follows: increase physical exercise daily    Hearing/Vision:  No exam data present  Hearing/Vision  Do you or your family notice any trouble with your hearing that hasn't been managed with hearing aids?: (!) Yes  Do you have difficulty driving, watching TV, or doing any of your daily activities because of your eyesight?: No  Have you had an eye exam within the past year?: Yes  Hearing/Vision Interventions:  · (primary) hypertension    Osteopenia, unspecified location  -     DEXA BONE DENSITY 2 SITES; Future    Vitamin D deficiency    Depression with anxiety    Mixed hyperlipidemia    Sciatica of left side    Encounter for screening mammogram for breast cancer  -     [place] DIGITAL SCREEN W OR WO CAD BILATERAL; Future    Menopause  -     DEXA BONE DENSITY 2 SITES; Future    Other orders  -     venlafaxine (EFFEXOR XR) 150 MG extended release capsule;  Take 1 capsule by mouth daily

## 2021-05-16 ASSESSMENT — ENCOUNTER SYMPTOMS
CONSTIPATION: 0
ABDOMINAL PAIN: 0
DIARRHEA: 0
CHEST TIGHTNESS: 0
SHORTNESS OF BREATH: 0
ANAL BLEEDING: 0
COUGH: 0
NAUSEA: 0

## 2021-06-11 RX ORDER — LISINOPRIL 10 MG/1
TABLET ORAL
Qty: 90 TABLET | Refills: 2 | Status: SHIPPED | OUTPATIENT
Start: 2021-06-11 | End: 2022-04-04

## 2021-07-22 ENCOUNTER — HOSPITAL ENCOUNTER (OUTPATIENT)
Dept: WOMENS IMAGING | Age: 66
Discharge: HOME | End: 2021-07-22
Payer: MEDICARE

## 2021-07-22 DIAGNOSIS — M85.80 OSTEOPENIA, UNSPECIFIED LOCATION: ICD-10-CM

## 2021-07-22 DIAGNOSIS — Z12.31 ENCOUNTER FOR SCREENING MAMMOGRAM FOR BREAST CANCER: ICD-10-CM

## 2021-07-22 DIAGNOSIS — Z78.0 MENOPAUSE: ICD-10-CM

## 2021-07-22 PROCEDURE — 77080 DXA BONE DENSITY AXIAL: CPT

## 2021-07-22 PROCEDURE — 77067 SCR MAMMO BI INCL CAD: CPT

## 2021-08-26 NOTE — PROGRESS NOTES
[facility] Sleep Follow Up Encounter      Information:   Patient Name: Khari Grande  :   1955  Age:   57 y.o. MRN:   [id number]  Account #:  [de-identified]  Today:                17    Provider:  Moris Farfan PA-C    Chief Complaint   Patient presents with    Sleep Apnea     Patient reports she can tell when she has no slept enough. Patient reports not waking up with pounding headache. Subjective:   Khari Grande is a 57 y.o. woman  with a history of severe PRASAD who comes in for an annual sleep clinic follow up. The PSG, 2009 revealed an AHI of 44.5. She is prescribed CPAP at 11cm of pressure. Today she reports that she is using CPAP 6 hours per night  and  is asymptomatic. She denies PRASAD symptoms. She uses a CPAP pillow. She is aware of leaks. It is time to replace the mask. She has a 3 year history of left sciatica. She had an x-ray in the past. The symptoms were aggravated by riding a bouncing lawnmower. She discontinued lawn mowing and the symptoms abated. She denies LBP, numbness, weakness, and tingling. She does have occasional sciatica described as burning with certain activities. She notes dizziness with quick position change. Her B/P runs low. She doesn't think she drinks enough water. She denies chest pain, palpitations, or weakness. She has a stress test about 1 year ago.      Location or symptom:  PRASAD  Onset:  PS2009  Timing:  q hs  Severity:  Severe  Associated:  Snoring, witnessed apneas, and excessive daytime somnolence  Alleviated:  CPAP      Objective:     Past Medical History:   Diagnosis Date    Anxiety     Asymptomatic postmenopausal status (age-related) (natural)     CPAP (continuous positive airway pressure) dependence     11cm    Depression     Headaches due to old head injury     Hyperlipidemia     Hypertension     Joint pain     knee and shoulder    Macular degeneration disease     beginning stage    Neoplasm of uncertain behavior of skin     Savi was working on the PA for this    Obstructive sleep apnea     AHI:  44.5 per PSG, 2/2009    Ringing in ears     Seasonal allergies     Unspecified sleep apnea     uses c-pap    Vitamin D deficiency        Past Surgical History:   Procedure Laterality Date    COLONOSCOPY  10 years    [place]-normal 10 yr recall    DILATION AND CURETTAGE OF UTERUS  2007       Recent Hospitalizations  ·     Significant Injuries  ·     Family History   Problem Relation Age of Onset    Stroke Father     Hypertension Father     Other Father      tumor removal, lower abd area   Alexy Hallmark Stroke Mother     Hypertension Mother    Francisca Kaska Mother     Other Maternal Grandmother      hodgkins    Other Maternal Grandfather      leukemia    Cancer Paternal Grandmother 80     pancreatic cancer    Heart Disease Other      paternal side of family       Social History  History   Smoking Status    Never Smoker   Smokeless Tobacco    Never Used     History   Alcohol Use    Yes     History   Drug Use No         Current Outpatient Prescriptions   Medication Sig Dispense Refill    lisinopril (PRINIVIL;ZESTRIL) 10 MG tablet Take 1 tablet by mouth daily 90 tablet 2    montelukast (SINGULAIR) 10 MG tablet Take 1 tablet by mouth nightly 90 tablet 2    venlafaxine 225 MG extended release tablet Take 1 tablet by mouth daily (with breakfast) 90 tablet 2    cyclobenzaprine (FLEXERIL) 5 MG tablet as needed       b complex vitamins capsule Take 1 capsule by mouth daily      fexofenadine (ALLEGRA ALLERGY) 180 MG tablet Take 180 mg by mouth daily      Misc Natural Products (LUTEIN 20) CAPS Take 20 mg by mouth daily      Calcium-Vitamin D (CALTRATE 600 PLUS-VIT D PO) Take  by mouth 2 times daily.  therapeutic multivitamin-minerals (THERAGRAN-M) tablet Take 1 tablet by mouth daily.  Probiotic Product (PROBIOTIC DAILY PO) Take by mouth       No current facility-administered medications for this visit.         Allergies:  Review of patient's allergies indicates no present plan. 4.  Continue CPAP  5. Follow up in 1 year      Note:  A total of >50% (>13 minutes) of 25 minutes was spent discussing the pathophysiology and treatment and/or coordination of care of the above diagnoses.

## 2021-09-27 ENCOUNTER — TELEPHONE (OUTPATIENT)
Dept: NEUROLOGY | Age: 66
End: 2021-09-27

## 2021-09-27 NOTE — TELEPHONE ENCOUNTER
Called patient to let them know we had to reschedule appointment. Mandy Dawson will not be in the office. Patient is aware of new appointment time and date.

## 2021-10-04 RX ORDER — MONTELUKAST SODIUM 10 MG/1
TABLET ORAL
Qty: 90 TABLET | Refills: 1 | Status: SHIPPED | OUTPATIENT
Start: 2021-10-04 | End: 2022-04-04

## 2021-11-16 ENCOUNTER — HOSPITAL ENCOUNTER (OUTPATIENT)
Dept: GENERAL RADIOLOGY | Age: 66
Discharge: HOME | End: 2021-11-16
Payer: MEDICARE

## 2021-11-16 ENCOUNTER — OFFICE VISIT (OUTPATIENT)
Dept: FAMILY MEDICINE CLINIC | Age: 66
End: 2021-11-16
Payer: MEDICARE

## 2021-11-16 ENCOUNTER — OFFICE VISIT (OUTPATIENT)
Dept: NEUROLOGY | Age: 66
End: 2021-11-16
Payer: MEDICARE

## 2021-11-16 VITALS
HEART RATE: 104 BPM | HEIGHT: 67 IN | SYSTOLIC BLOOD PRESSURE: 127 MMHG | BODY MASS INDEX: 27.47 KG/M2 | DIASTOLIC BLOOD PRESSURE: 74 MMHG | WEIGHT: 175 LBS | OXYGEN SATURATION: 98 %

## 2021-11-16 VITALS
OXYGEN SATURATION: 98 % | DIASTOLIC BLOOD PRESSURE: 74 MMHG | HEIGHT: 67 IN | BODY MASS INDEX: 27.47 KG/M2 | WEIGHT: 175 LBS | HEART RATE: 104 BPM | SYSTOLIC BLOOD PRESSURE: 124 MMHG | TEMPERATURE: 97.2 F

## 2021-11-16 DIAGNOSIS — G89.29 CHRONIC BILATERAL LOW BACK PAIN WITH LEFT-SIDED SCIATICA: ICD-10-CM

## 2021-11-16 DIAGNOSIS — M54.42 CHRONIC BILATERAL LOW BACK PAIN WITH LEFT-SIDED SCIATICA: ICD-10-CM

## 2021-11-16 DIAGNOSIS — I10 ESSENTIAL (PRIMARY) HYPERTENSION: ICD-10-CM

## 2021-11-16 DIAGNOSIS — G47.33 OBSTRUCTIVE SLEEP APNEA: Primary | ICD-10-CM

## 2021-11-16 DIAGNOSIS — Z99.89 CPAP (CONTINUOUS POSITIVE AIRWAY PRESSURE) DEPENDENCE: ICD-10-CM

## 2021-11-16 DIAGNOSIS — M85.80 OSTEOPENIA, UNSPECIFIED LOCATION: ICD-10-CM

## 2021-11-16 DIAGNOSIS — M25.561 ACUTE PAIN OF RIGHT KNEE: Primary | ICD-10-CM

## 2021-11-16 DIAGNOSIS — M65.9 TENOSYNOVITIS OF LEFT WRIST: ICD-10-CM

## 2021-11-16 DIAGNOSIS — M25.561 ACUTE PAIN OF RIGHT KNEE: ICD-10-CM

## 2021-11-16 PROBLEM — M65.932 TENOSYNOVITIS OF LEFT WRIST: Status: ACTIVE | Noted: 2021-11-16

## 2021-11-16 PROCEDURE — G8417 CALC BMI ABV UP PARAM F/U: HCPCS | Performed by: PHYSICIAN ASSISTANT

## 2021-11-16 PROCEDURE — G8399 PT W/DXA RESULTS DOCUMENT: HCPCS | Performed by: PHYSICIAN ASSISTANT

## 2021-11-16 PROCEDURE — G8427 DOCREV CUR MEDS BY ELIG CLIN: HCPCS | Performed by: FAMILY MEDICINE

## 2021-11-16 PROCEDURE — 1123F ACP DISCUSS/DSCN MKR DOCD: CPT | Performed by: PHYSICIAN ASSISTANT

## 2021-11-16 PROCEDURE — G8399 PT W/DXA RESULTS DOCUMENT: HCPCS | Performed by: FAMILY MEDICINE

## 2021-11-16 PROCEDURE — G8482 FLU IMMUNIZE ORDER/ADMIN: HCPCS | Performed by: FAMILY MEDICINE

## 2021-11-16 PROCEDURE — 99214 OFFICE O/P EST MOD 30 MIN: CPT | Performed by: FAMILY MEDICINE

## 2021-11-16 PROCEDURE — 4040F PNEUMOC VAC/ADMIN/RCVD: CPT | Performed by: FAMILY MEDICINE

## 2021-11-16 PROCEDURE — 1090F PRES/ABSN URINE INCON ASSESS: CPT | Performed by: FAMILY MEDICINE

## 2021-11-16 PROCEDURE — 1036F TOBACCO NON-USER: CPT | Performed by: PHYSICIAN ASSISTANT

## 2021-11-16 PROCEDURE — 3017F COLORECTAL CA SCREEN DOC REV: CPT | Performed by: PHYSICIAN ASSISTANT

## 2021-11-16 PROCEDURE — 99213 OFFICE O/P EST LOW 20 MIN: CPT | Performed by: PHYSICIAN ASSISTANT

## 2021-11-16 PROCEDURE — 1036F TOBACCO NON-USER: CPT | Performed by: FAMILY MEDICINE

## 2021-11-16 PROCEDURE — G8482 FLU IMMUNIZE ORDER/ADMIN: HCPCS | Performed by: PHYSICIAN ASSISTANT

## 2021-11-16 PROCEDURE — 1123F ACP DISCUSS/DSCN MKR DOCD: CPT | Performed by: FAMILY MEDICINE

## 2021-11-16 PROCEDURE — 1090F PRES/ABSN URINE INCON ASSESS: CPT | Performed by: PHYSICIAN ASSISTANT

## 2021-11-16 PROCEDURE — 3017F COLORECTAL CA SCREEN DOC REV: CPT | Performed by: FAMILY MEDICINE

## 2021-11-16 PROCEDURE — G8427 DOCREV CUR MEDS BY ELIG CLIN: HCPCS | Performed by: PHYSICIAN ASSISTANT

## 2021-11-16 PROCEDURE — 73562 X-RAY EXAM OF KNEE 3: CPT

## 2021-11-16 PROCEDURE — 72100 X-RAY EXAM L-S SPINE 2/3 VWS: CPT

## 2021-11-16 PROCEDURE — G8417 CALC BMI ABV UP PARAM F/U: HCPCS | Performed by: FAMILY MEDICINE

## 2021-11-16 PROCEDURE — 4040F PNEUMOC VAC/ADMIN/RCVD: CPT | Performed by: PHYSICIAN ASSISTANT

## 2021-11-16 NOTE — PROGRESS NOTES
[facility] Neurology and Sleep Medicine  [address] [place] [address] Drive, [address]  [place] l[place]  Phone [phone]  Fax [phone]       [facility] Sleep Follow Up Encounter      Information:   Patient Name: Arlene Bravo  :   1955  Age:   74 y.o. MRN:   [id number]  Account #:  [de-identified]  Today:                21    Provider:  Sue Taylor PA-C    Chief Complaint   Patient presents with    Sleep Apnea     follow up         Subjective:   Arlene Bravo is a 74 y.o. female  with a history of severe PRASAD who comes in for an annual sleep clinic follow up. The PSG, 2009 revealed an AHI of 44.5. She is prescribed CPAP at 11cm of pressure. The compliance report indicates that she is averaging 6.5  hours of CPAP use per day. She averages 6-7 hours of sleep per night. She reports that consistent CPAP use has alleviated the previous PRASAD symptoms.  The CPAP is possible out of date.      Location or symptom:  PRASAD  Onset:  PS2009  Timing:  q hs  Severity:  Severe  Associated:  Snoring, witnessed apneas, and excessive daytime somnolence  Alleviated:  CPAP (2nd CPAP)      Objective:     Past Medical History:   Diagnosis Date    Anxiety     Asymptomatic postmenopausal status (age-related) (natural)     CPAP (continuous positive airway pressure) dependence     11cm    Depression     Headaches due to old head injury     Hyperlipidemia     Hypertension     Joint pain     knee and shoulder    Macular degeneration disease     beginning stage    Neoplasm of uncertain behavior of skin     Obstructive sleep apnea     AHI:  44.5 per PSG, 2009    Ringing in ears     Seasonal allergies     Unspecified sleep apnea     uses c-pap    Vitamin D deficiency        Past Surgical History:   Procedure Laterality Date    COLONOSCOPY  10 years    [place]-normal 10 yr recall    DILATION AND CURETTAGE OF UTERUS  2007    [place] COLONOSCOPY FLX DX W/COLLJ SPEC WHEN PFRMD N/A 2018    Dr Silvana Reyes FLX DX W/COLLJ SPEC WHEN PFRMD N/A 8/17/2018    Dr Thom Parker, 5 yr recall       Recent Hospitalizations  ·     Significant Injuries  ·     Family History   Problem Relation Age of Onset    Stroke Father     Hypertension Father     Other Father         tumor removal, lower abd area   Elle Skeans Stroke Mother     Hypertension Mother    Zelda Lula Mother     Other Maternal Grandmother         hodgkins    Other Maternal Grandfather         leukemia    Cancer Paternal Grandmother 80        pancreatic cancer    Heart Disease Paternal Cousin         paternal side of family    Cancer Paternal Cousin         colon    Breast Cancer Paternal Aunt        Social History  Social History     Tobacco Use   Smoking Status Never Smoker   Smokeless Tobacco Never Used     Social History     Substance and Sexual Activity   Alcohol Use No     Social History     Substance and Sexual Activity   Drug Use No         Current Outpatient Medications   Medication Sig Dispense Refill    diclofenac sodium (VOLTAREN) 1 % GEL Apply 4 g topically 4 times daily 480 g 2    montelukast (SINGULAIR) 10 MG tablet TAKE 1 TABLET BY MOUTH EVERY NIGHT 90 tablet 1    lisinopril (PRINIVIL;ZESTRIL) 10 MG tablet TAKE 1 TABLET BY MOUTH EVERY DAY 90 tablet 2    venlafaxine (EFFEXOR XR) 150 MG extended release capsule Take 1 capsule by mouth daily 90 capsule 3    clotrimazole-betamethasone (LOTRISONE) 1-0.05 % cream APPLY  CREAM TOPICALLY TWICE DAILY 15 g 0    Multiple Vitamins-Minerals (OCUVITE ADULT 50+ PO) Take by mouth daily      zoster recombinant adjuvanted vaccine (SHINGRIX) 50 MCG/0.5ML SUSR injection Inject 0.5 mLs into the muscle See Admin Instructions 1 dose now and repeat in 2-6 months 1 each 1    fexofenadine (ALLEGRA ALLERGY) 180 MG tablet Take 180 mg by mouth daily      Calcium-Vitamin D (CALTRATE 600 PLUS-VIT D PO) Take  by mouth 2 times daily.       therapeutic multivitamin-minerals (THERAGRAN-M) tablet Take 1 tablet by mouth erythema, or pallor  Psychiatric  Mood, affect, and behavior appear normal      Neurologic:  Extraocular movements are intact without nystagmus. PERRL. Visual fields are full to confrontation. Facial movements are symmetrical and normal.  Speech is precise. Extremity strength is normal in both uppers and lowers. Deep tendon reflexes are intact and symmetrical.  Rapid alternating movements are unimpaired. Finger-to-nose testing is performed well, without dysmetria. Gait is normal.    I reviewed the following studies:       []  :  Clinical laboratory test results     []  :  Radiology reports                    [x]  :  Review and summarization of medical records and/or obtain medical records        []  :  Previous/recent polysomnogram report(s)     []  :  [place] Sleepiness Scale       [x]  :  Compliance download: The CPAP is set at a pressure of 11cm. Compliance download shows that she uses device: 100% of the time;  percentage of days with usage >=4 hours: 93%. AHI: 3.3    Assessment:       ICD-10-CM    1. Obstructive sleep apnea  G47.33    2. CPAP (continuous positive airway pressure) dependence  Z99.89           []  :  Stable     []  :  Improved                       [x]  :  Well controlled              []  :  Resolving     []  :  Resolved     []  :  Inadequately controlled     []  :  Worsening     []  :  Additional workup planned    Patient is compliant and benefiting from therapy as indicated by compliance evaluation and patient report. Plan:     No orders of the defined types were placed in this encounter. 1.   Patient advised of the etiology,  pathophysiology, diagnosis, treatment options, and risks of untreated PRASAD. Risks may include, but are not limited to  hypertension, coronary artery disease, diabetes, stroke, weight gain, impaired cognition, daytime somnolence, and motor vehicle accidents.  Advised to abstain from driving or operating heavy machinery when drowsy and the use of respiratory suppressants. Discussed diagnostic studies and potential treatment plan. Will evaluate for clinical benefit and and compliance during a 30 day period within the preceding 90 days. 2.  The following educational material has been included in this visit after visit summary for your review: PRASAD/PAP guidelines-Discussed with the patient and all questions fully answered. 3.  Continue CPAP  Therapy/Resmed. The patient voices understanding and recognizes the need for adherence to the prescribed therapy. 4. Order-supplies-[facility]  5.   Follow up in 1 year

## 2021-11-16 NOTE — PROGRESS NOTES
postmenopausal status (age-related) (natural)     CPAP (continuous positive airway pressure) dependence     11cm    Depression     Headaches due to old head injury     Hyperlipidemia     Hypertension     Joint pain     knee and shoulder    Macular degeneration disease     beginning stage    Neoplasm of uncertain behavior of skin     Obstructive sleep apnea     AHI:  44.5 per PSG, 2/2009    Ringing in ears     Seasonal allergies     Unspecified sleep apnea     uses c-pap    Vitamin D deficiency      Past Surgical History:   Procedure Laterality Date    COLONOSCOPY  10 years    [place]-normal 10 yr recall    DILATION AND CURETTAGE OF UTERUS  2007    [place] COLONOSCOPY FLX DX W/COLLJ SPEC WHEN PFRMD N/A 8/16/2018    Dr Omalley-incomplete    [place] COLONOSCOPY FLX DX W/COLLJ SPEC WHEN PFRMD N/A 8/17/2018    Dr Moses Henriquez, 5 yr recall       Family History   Problem Relation Age of Onset    Stroke Father     Hypertension Father     Other Father         tumor removal, lower abd area    Stroke Mother     Hypertension Mother    Griselda Shall Mother     Other Maternal Grandmother         hodgkins    Other Maternal Grandfather         leukemia    Cancer Paternal Grandmother 80        pancreatic cancer    Heart Disease Paternal Cousin         paternal side of family    Cancer Paternal Cousin         colon    Breast Cancer Paternal Aunt        Social History     Tobacco Use    Smoking status: Never Smoker    Smokeless tobacco: Never Used   Substance Use Topics    Alcohol use: No      Current Outpatient Medications   Medication Sig Dispense Refill    diclofenac sodium (VOLTAREN) 1 % GEL Apply 4 g topically 4 times daily 480 g 2    montelukast (SINGULAIR) 10 MG tablet TAKE 1 TABLET BY MOUTH EVERY NIGHT 90 tablet 1    lisinopril (PRINIVIL;ZESTRIL) 10 MG tablet TAKE 1 TABLET BY MOUTH EVERY DAY 90 tablet 2    venlafaxine (EFFEXOR XR) 150 MG extended release capsule Take 1 capsule by mouth daily 90 capsule 3    clotrimazole-betamethasone (LOTRISONE) 1-0.05 % cream APPLY  CREAM TOPICALLY TWICE DAILY 15 g 0    Multiple Vitamins-Minerals (OCUVITE ADULT 50+ PO) Take by mouth daily      zoster recombinant adjuvanted vaccine (SHINGRIX) 50 MCG/0.5ML SUSR injection Inject 0.5 mLs into the muscle See Admin Instructions 1 dose now and repeat in 2-6 months 1 each 1    fexofenadine (ALLEGRA ALLERGY) 180 MG tablet Take 180 mg by mouth daily      Calcium-Vitamin D (CALTRATE 600 PLUS-VIT D PO) Take  by mouth 2 times daily.  therapeutic multivitamin-minerals (THERAGRAN-M) tablet Take 1 tablet by mouth daily. No current facility-administered medications for this visit. Allergies   Allergen Reactions    Latex        Health Maintenance   Topic Date Due    Shingles Vaccine (2 of 3) 06/10/2013    Annual Wellness Visit (AWV)  05/07/2022    Potassium monitoring  09/28/2022    Creatinine monitoring  09/28/2022    DTaP/Tdap/Td vaccine (2 - Td or Tdap) 07/15/2023    Breast cancer screen  07/22/2023    Colon cancer screen colonoscopy  08/17/2023    Lipid screen  09/28/2026    DEXA (modify frequency per FRAX score)  Completed    Flu vaccine  Completed    Pneumococcal 65+ years Vaccine  Completed    COVID-19 Vaccine  Completed    Hepatitis C screen  Completed    Hepatitis A vaccine  Aged Out    Hepatitis B vaccine  Aged Out    Hib vaccine  Aged Out    Meningococcal (ACWY) vaccine  Aged Out       Subjective:     Review of Systems   Constitutional: Negative for chills and fever. HENT: Negative for congestion. Respiratory: Negative for cough, chest tightness and shortness of breath. Cardiovascular: Negative for chest pain, palpitations and leg swelling. Gastrointestinal: Negative for abdominal pain, anal bleeding, constipation, diarrhea and nausea. Genitourinary: Negative for difficulty urinating. Musculoskeletal: Positive for arthralgias and back pain.    Neurological: Positive for numbness. Negative for weakness. Psychiatric/Behavioral: Negative. See HPI for visit specific review of symptoms. All others negative      Objective:   /74   Pulse 104   Temp 97.2 °F (36.2 °C)   Ht 5' 7\" (1.702 m)   Wt 175 lb (79.4 kg)   SpO2 98%   BMI 27.41 kg/m²    Physical Exam  Constitutional:       Appearance: She is well-developed. She is not ill-appearing. Eyes:      Pupils: Pupils are equal, round, and reactive to light. Cardiovascular:      Rate and Rhythm: Normal rate and regular rhythm. Heart sounds: No murmur heard. No friction rub. Pulmonary:      Effort: Pulmonary effort is normal. No respiratory distress. Breath sounds: Normal breath sounds. No wheezing or rales. Abdominal:      General: Bowel sounds are normal. There is no distension. Palpations: Abdomen is soft. Tenderness: There is no abdominal tenderness. There is no guarding or rebound. Musculoskeletal:         General: Tenderness (right anterior knee) present. Cervical back: Normal range of motion and neck supple. Neurological:      Mental Status: She is alert. Psychiatric:         Behavior: Behavior normal.           Lab Review   No results found for this or any previous visit (from the past 672 hour(s)). Assessment & Plan: The following diagnoses and conditions are stable withno further orders unless indicated:  Yosef Valenzuela was seen today for 6 month follow-up and knee pain. Diagnoses and all orders for this visit:    Acute pain of right knee  -     XR KNEE RIGHT (3 VIEWS); Future  -     [facility]  Refer for knee xray. Refer for PT. Wear knee brace for support. Chronic bilateral low back pain with left-sided sciatica  -     XR LUMBAR SPINE (2-3 VIEWS); Future  Refer for lumbar xray, refer to PT. Stretching exercises encouraged. Essential (primary) hypertension  Blood pressure is stable. Continue current medications.  Monitor ambulatory bp readings, if persistently >140/90, return to clinic. Tenosynovitis of left wrist  Tx with voltaren gel, wrist brace for support. Osteopenia, unspecified location  Calcium, vit D supplements, weight bearing exercises. Other orders  -     diclofenac sodium (VOLTAREN) 1 % GEL; Apply 4 g topically 4 times daily            Return in about 6 months (around 5/16/2022) for Annual Wellness Exam.            Discussed use, benefit, and side effects of prescribed medications. All patient questions answered. Pt voiced understanding. Reviewed health maintenance. Instructed to continue current medications, diet and exercise. Patient agreedwith treatment plan. Follow up as directed.

## 2021-11-16 NOTE — LETTER
[facility]  [address] [place] [address] Drive, [address] [place] [address]  Phone [phone]  Fax [phone]             Re:  Alison Lake    19  :  1955  Address: [facility] [address]       Replinishible PAP Supplies, 1 year supply  Item HPCPS Code Frequency   Mask of choice  or  1 per 3 months   Nasal Mask cushion/pillows  or  2 per 30 days   Full Face Mask Interface  1 per 30 days   Headgear  1 per 6 months   Tubing, length of choice  or  1 per 3 months   Water Chamber  1 per 6 months   Chinstrap  1 per 6 months   Disposable Filters  2 per 30 days   Reusable Filters  1 per 6 months     Diagnoses:  Obstructive sleep apnea (G47.33)  Length of Need: Lifetime, 99    Ordering Provider: Imtiaz Henderson PA-C  NPI:  [id number]        Signature: [unfilled]        Date: 2021      Electronically Signed by Imtiaz Henderson PA-C  on 2021 at 1:40 PM

## 2021-11-16 NOTE — PATIENT INSTRUCTIONS
Patient education: Sleep apnea in adults       INTRODUCTION  Normally during sleep, air moves through the throat and in and out of the lungs at a regular rhythm. In a person with sleep apnea, air movement is periodically diminished or stopped. There are two types of sleep apnea: obstructive sleep apnea and central sleep apnea. In obstructive sleep apnea, breathing is abnormal because of narrowing or closure of the throat. In central sleep apnea, breathing is abnormal because of a change in the breathing control and rhythm. Sleep apnea is a serious condition that can affect a person's ability to safely perform normal daily activities and can affect long term health. Approximately 25 percent of adults are at risk for sleep apnea of some degree. Men are more commonly affected than women. Other risk factors include middle and older age, being overweight or obese, and having a small mouth and throat. This topic review focuses on the most common type of sleep apnea in adults, obstructive sleep apnea (PRASAD). HOW SLEEP APNEA OCCURS  The throat is surrounded by muscles that control the airway for speaking, swallowing, and breathing. During sleep, these muscles are less active, and this causes the throat to narrow. In most people, this narrowing does not affect breathing. In others, it can cause snoring, sometimes with reduced or completely blocked airflow. A completely blocked airway without airflow is called an obstructive apnea. Partial obstruction with diminished airflow is called a hypopnea. A person may have apnea and hypopnea during sleep. Insufficient breathing due to apnea or hypopnea causes oxygen levels to fall and carbon dioxide to rise. Because the airway is blocked, breathing faster or harder does not help to improve oxygen levels until the airway is reopened. Typically, the obstruction requires the person to awaken to activate the upper airway muscles.  Once the airway is opened, the person then takes several deep breaths to catch up on breathing. As the person awakens, he or she may move briefly, snort or snore, and take a deep breath. Less frequently, a person may awaken completely with a sensation of gasping, smothering, or choking. If the person falls back to sleep quickly, he or she will not remember the event. Many people with sleep apnea are unaware of their abnormal breathing in sleep, and all patients underestimate how often their sleep is interrupted. Awakening from sleep causes sleep to be unrefreshing and causes fatigue and daytime sleepiness. Anatomic causes of obstructive sleep apnea   Most patients have PRASAD because of a small upper airway. As the bones of the face and skull develop, some people develop a small lower face, a small mouth, and a tongue that seems too large for the mouth. These features are genetically determined, which explains why PRASAD tends to cluster in families. Obesity is another major factor. Tonsil enlargement can be an important cause, especially in children. SLEEP APNEA SYMPTOMS  The main symptoms of PRASAD are loud snoring, fatigue, and daytime sleepiness. However, some people have no symptoms. For example, if the person does not have a bed partner, he or she may not be aware of the snoring. Fatigue and sleepiness have many causes and are often attributed to overwork and increasing age. As a result, a person may be slow to recognize that they have a problem. A bed partner or spouse often prompts the patient to seek medical care. Other symptoms may include one or more of the following:  ?Restless sleep  ? Awakening with choking, gasping, or smothering  ? Morning headaches, dry mouth, or sore throat  ? Waking frequently to urinate  ? Awakening unrested, groggy  ? Low energy, difficulty concentrating, memory impairment    Risk factors  Certain factors increase the risk of sleep apnea.   ?Increasing age [de-identified] PRASAD occurs at all ages, but it is more common in middle and older age adults. ?Male sex  PRASAD is two times more common in men, especially in middle age. ?Obesity  The more obese a person is, the more likely he or she is to have PRASAD. ? Sedation from medication or alcohol  This interferes with the ability to awaken from sleep and can lengthen periods of apnea (no breathing), with potentially dangerous consequences. ? Abnormality of the airway. SLEEP APNEA CONSEQUENCES  Complications of sleep apnea can include daytime sleepiness and difficulty concentrating. The consequence of this is an increased risk of accidents and errors in daily activities. Studies have shown that people with severe PRASAD are more than twice as likely to be involved in a motor vehicle accident as people without these conditions. People with PRASAD are encouraged to discuss options for driving, working, and performing other high-risk tasks with a healthcare provider. In addition, people with untreated PRASAD may have an increased risk of cardiovascular problems such as high blood pressure, heart attack, abnormal heart rhythms, or stroke. This risk may be due to changes in the heart rate and blood pressure that occur during sleep. SLEEP APNEA DIAGNOSIS  The diagnosis of PRASAD is best made by a knowledgeable sleep medicine specialist who has an understanding of the individual's health issues. The diagnosis is usually based upon the person's medical history, physical examination, and testing, including:  ? A complaint of snoring and ineffective sleep  ? Neck size (greater than 16 inches in men or 14 inches in women) is associated with an increased risk of sleep apnea  ? A small upper airway: difficulty seeing the throat because of a tongue that is large for the mouth  ? High blood pressure, especially if it is resistant to treatment  ? If a bed partner has observed the patient during episodes of stopped breathing (apnea), choking, or gasping during sleep, there is a strong possibility of sleep apnea.     Testing is usually performed in a sleep laboratory. A full sleep study is called a polysomnogram. The polysomnogram measures the breathing effort and airflow, blood oxygen level, heart rate and rhythm, duration of the various stages of sleep, body position, and movement of the arms/legs. Home monitoring devices are available that can perform a sleep study. This is a reasonable alternative to conventional testing in a sleep laboratory if the clinician strongly suspects moderate or severe sleep apnea and the patient does not have other illnesses or sleep disorders that may interfere with the results. SLEEP APNEA TREATMENT  Sleep apnea is best treated by a knowledgeable sleep medicine specialist. The goal of treatment is to maintain an open airway during sleep. Effective treatment will eliminate the symptoms of sleep disturbance; long-term health consequences are also reduced. Most treatments require nightly use. The challenge for the clinician and the patient is to select an effective therapy that is appropriate for the patient's problem and that is acceptable for long term use. Auto-titrating CPAP delivers an amount of PAP that varies during the night. The variation is dependent on event detection software algorithms, which will increase the pressure gradually in response to flow changes until adequate patency is detected. After a period of sustained upper airway patency, the delivered level of pressure gradually decreases until the algorithm identifies recurrent upper airway obstruction, at which point the delivered pressure again increases. The result is that the delivered pressure varies throughout the night, in an effort to provide the lowest pressure that is necessary to maintain upper airway patency. Continuous positive airway pressure (CPAP)  The most effective treatment for sleep apnea uses air pressure from a mechanical device to keep the upper airway open during sleep.  A CPAP (continuous positive airway pressure)  device uses an air-tight attachment to the nose, typically a mask, connected to a tube and a blower which generates the pressure. Devices that fit comfortably into the nasal opening, rather than over the nose, are also available. CPAP should be used any time the person sleeps (day or night). The CPAP device is usually used for the first time in the sleep lab, where a technician can adjust the pressure and select the best equipment to keep the airway open. Alternatively, an auto device with a self-adjusting pressure feature, provided with proper education and training, can get treatment started without another sleep test. While the treatment may seem uncomfortable, noisy, or bulky at first, most people accept the treatment after experiencing better sleep. However, difficulty with mask comfort and nasal congestion prevent up to 50 percent of people from using the treatment on a regular basis. Continued follow up with a healthcare provider helps to ensure that the treatment is effective and comfortable. Information from the CPAP machine is often used by physicians, therapists, and insurers to track the success of treatment. CPAP can be delivered with different features to improve comfort and solve problems that may come up during treatment. Changes in treatment may be needed if symptoms do not improve or if the persons condition changes, such as a gain or loss of weight. Adjust sleep position  Adjusting sleep position (to stay off the back) may help improve sleep quality in people who have PRASAD when sleeping on the back. However, this is difficult to maintain throughout the night and is rarely an adequate solution. Weight loss  Weight loss may be helpful for obese or overweight patients. Weight loss may be accomplished with dietary changes, exercise, and/or surgical treatment.  However, it can be difficult to maintain weight loss; the five-year success of non-surgical weight loss is only 5 percent, meaning that 95 percent of people regain lost weight. Avoid alcohol and other sedatives  Alcohol can worsen sleepiness, potentially increasing the risk of accidents or injury. People with PRASAD are often counseled to drink little to no alcohol, even during the daytime. Similarly, people who take anti-anxiety medications or sedatives to sleep should speak with their healthcare provider about the safety of these medications. People with PRASAD must notify all healthcare providers, including surgeons, about their condition and the potential risks of being sedated. People with PRASAD who are given anesthesia and/or pain medications require special management and close monitoring to reduce the risk of a blocked airway. Dental devices  A dental device, called an oral appliance or mandibular advancement device, can reposition the jaw (mandible), bringing the tongue and soft palate forward as well. This may relieve obstruction in some people. This treatment is excellent for reducing snoring, although the effect on PRASAD is sometimes more limited. As a result, dental devices are best used for mild cases of PRASAD when relief of snoring is the main goal. Failure to tolerate and accept CPAP is another indication for dental devices. While dental devices are not as effective as CPAP for PRASAD, some patients prefer a dental device to CPAP. Side effects of dental devices are generally minor but may include changes to the bite with prolonged use. Surgical treatment  Surgery is an alternative therapy for patients who cannot tolerate or do not improve with nonsurgical treatments such as CPAP or oral devices. Surgery can also be used in combination with other nonsurgical treatments. Surgical procedures reshape structures in the upper airways or surgically reposition bone or soft tissue. Uvulopalatopharyngoplasty (UPPP) removes the uvula and excessive tissue in the throat, including the tonsils, if present.  Other procedures, such as maxillomandibular advancement (MMA), address both the upper and lower pharyngeal airway more globally. UPPP alone has limited success rates (less than 50 percent) and people can relapse (when PRASAD symptoms return after surgery). As a result, this surgery is only recommended in a minority of people and should be considered with caution. MMA may have a higher success rate, particularly in people with abnormal jaw (maxilla and mandible) anatomy, but it is the most complicated procedure. A newer surgical approach, nerve stimulation to protrude the tongue, has promising success rates in very selected people. Tracheostomy creates a permanent opening in the neck. It is reserved for people with severe disease in whom less drastic measures have failed or are inappropriate. Although it is always successful in eliminating obstructive sleep apnea, tracheostomy requires significant lifestyle changes and carries some serious risks (eg, infection, bleeding, blockage). All surgical treatments require discussions about the goals of treatment, the expected outcomes, and potential complications. Hypoglossal nerve stimulator- \"Inspire\" device    PAP treatment failure:  Possible causes of treatment failure include nonadherence or suboptimal adherence, weight gain, an inappropriate level of prescribed positive pressure, or an additional disorder causing sleepiness (eg, narcolepsy) that may require alterations in the therapeutic regimen. A review of medications should also be undertaken since many drugs may lead to sleepiness. Inadequate sleep time may also negate the expected effects from treatment of PRASAD. Also, pt's can have persistent hypersomnolence associated with sleep apnea even in the presence of adequate therapy and at those times Provigil or Nuvigil or other stimulants may be indicated.     Once the patient's positive airway pressure therapy has been optimized and symptoms resolved, a regimen of long-term follow-up should be established. Annual visits are reasonable, with more frequent visits in between if new issues arise. The purpose of long-term follow-up is to assess usage and monitor for recurrent PRASAD, new side effects, air leakage, and fluctuations in body weight. WHERE TO GET MORE INFORMATION  Your healthcare provider is the best source of information for questions and concerns related to your medical problem. Organizations  American Sleep Apnea Association  Provides information about sleep apnea to the public, publishes a newsletter, and serves as an advocate for people with the disorder. [place], [address]   Doyle@[employer]. org   [employer].. org   Tel: [phone]   Fax: [facility] organization that works to [employer] and safety by promoting public understanding of sleep and sleep disorders. Supports sleep-related education, research, and advocacy; produces and distributes educational materials to the public and healthcare professionals; and offers postdoctoral fellowships and grants for sleep researchers. [address]   Jovanny@[employer]. org   [employer]. org   Tel: [phone]   Fax: [phone]    Important information:  Medicare/private insurance CPAP/BiPAP/APAP requirements:  Medicare/private insurance has specific requirements for PAP compliance that must be met during the first 90 days of use to continue coverage for CPAP/BiPAP/APAP  from day 91 and beyond. The policy requires that patients use a PAP device 4 hours per 24 hour period, at least 70% of the time over a 30 day period. This data must be downloaded as a report direct from the PAP devices. This is called a compliance download. Your PAP supplier will assist you in this matter.      Note:  Where applicable, we will utilize PAP device efficiency reports, additional testing, and face-to-face  clinical evaluation subsequent to any treatment, changes in treatment, and continued treatment. Caution:  Please abstain from driving or engaging in other activities which may be hazardous in the presence of diminished alertness or daytime drowsiness. And avoid the use of sedatives or alcohol, which can worsen sleep apnea and daytime drowsiness. Mask suggestions:  -     Resmed Airfit N20 (Nasal) or F20 (Full face mask). They conform to your face, thus decreasing the potential for mask leakage. You might like the AirTouch F20(full face mask). It has a \"memory foam\" like cushion. The AirFit F30 is a smaller style full face mask designed to sit low on and cover less of your face for fewer facial marks. AirFit N30i has a top of the head tube with a nasal mask. AirFit P10 reported to be the most comfortable nasal pillow mask. Resmed Mirage FX reported to be the most comfortable nasal mask. Resmed Mirage Callahan reported to be the most comfortable hybrid mask. AirTouch N20-memory foam nasal mask. Respironics: You might also like to try a nasal mask called a Dreamwear nasal mask or the Dreamwear nasal pillow. Another suggestion is the [facility], it is a minimal contact full face mask. The Chayo Villafana incredible under the nose design makes it the only full face mask that won't cause red marks on the bridge of your nose when compared to other full face masks. The Dreamwear full face mask has a  soft feel, unique in-frame air-flow, and innovative air tube connection at the top of the head for the ultimate in sleep comfort. Comfort Gel Blue. Dreamwear gel pillows. Gerber [employer] Eli[employer]: Brevida nasal pillow mask and Simplus FFM    The use of a memory foam CPAP pillow supports the head and neck throughout the night.

## 2021-11-17 ENCOUNTER — PROCEDURE VISIT (OUTPATIENT)
Dept: ENT CLINIC | Age: 66
End: 2021-11-17
Payer: MEDICARE

## 2021-11-17 DIAGNOSIS — M77.9 BONE SPUR: Primary | ICD-10-CM

## 2021-11-17 DIAGNOSIS — M25.561 ACUTE PAIN OF RIGHT KNEE: Primary | ICD-10-CM

## 2021-11-17 DIAGNOSIS — H90.3 SENSORINEURAL HEARING LOSS (SNHL) OF BOTH EARS: Primary | ICD-10-CM

## 2021-11-17 PROCEDURE — 92552 PURE TONE AUDIOMETRY AIR: CPT | Performed by: AUDIOLOGIST

## 2021-11-17 NOTE — PROGRESS NOTES
History   Xavier Jimenez is a 74 y.o. female who presented to the clinic this date for an annual recheck of hearing. She denied problems or concerns since last visit. Summary   Pure tone testing indicates moderate high frequency SNHL bilaterally. Threshold today remained stable when compared to audiogram obtained in November 2020. Results   Otoscopy:    Right: Clear EAC/Normal TM   Left: Clear EAC/Normal TM    Audiometry:    Right: Moderate high frequency SNHL   Left: Moderate high frequency SNHL         Plan   Results of today's testing were discussed with Ms. Mar Dickinson and the following recommendations were made:    1. Monitor hearing yearly, sooner with changes. 2. Hearing aid evaluation as desired. 3. Monitor hearing yearly, sooner with changes.         Audiogram and Acoustic Immittance

## 2021-12-04 PROBLEM — G89.29 CHRONIC BILATERAL LOW BACK PAIN WITH LEFT-SIDED SCIATICA: Status: ACTIVE | Noted: 2021-12-04

## 2021-12-04 PROBLEM — M54.42 CHRONIC BILATERAL LOW BACK PAIN WITH LEFT-SIDED SCIATICA: Status: ACTIVE | Noted: 2021-12-04

## 2021-12-04 ASSESSMENT — ENCOUNTER SYMPTOMS
DIARRHEA: 0
CHEST TIGHTNESS: 0
SHORTNESS OF BREATH: 0
ANAL BLEEDING: 0
BACK PAIN: 1
COUGH: 0
CONSTIPATION: 0
NAUSEA: 0
ABDOMINAL PAIN: 0

## 2022-04-04 RX ORDER — MONTELUKAST SODIUM 10 MG/1
TABLET ORAL
Qty: 90 TABLET | Refills: 1 | Status: SHIPPED | OUTPATIENT
Start: 2022-04-04 | End: 2022-09-28

## 2022-04-04 RX ORDER — LISINOPRIL 10 MG/1
TABLET ORAL
Qty: 90 TABLET | Refills: 2 | Status: SHIPPED | OUTPATIENT
Start: 2022-04-04

## 2022-04-04 NOTE — TELEPHONE ENCOUNTER
Haleigh Rivers called to request a refill on her medication.       Last office visit : 11/16/2021   Next office visit : 5/10/2022     Requested Prescriptions     Signed Prescriptions Disp Refills    lisinopril (PRINIVIL;ZESTRIL) 10 MG tablet 90 tablet 2     Sig: TAKE 1 TABLET BY MOUTH EVERY DAY     Authorizing Provider: Cindy Diaz     Ordering User: Jennifer Cummins    montelukast (SINGULAIR) 10 MG tablet 90 tablet 1     Sig: TAKE 1 TABLET BY MOUTH EVERY NIGHT     Authorizing Provider: Cindy Diaz     Ordering User: Imleda Alexander

## 2022-05-10 ENCOUNTER — OFFICE VISIT (OUTPATIENT)
Dept: FAMILY MEDICINE CLINIC | Age: 67
End: 2022-05-10
Payer: MEDICARE

## 2022-05-10 VITALS
SYSTOLIC BLOOD PRESSURE: 118 MMHG | OXYGEN SATURATION: 100 % | HEIGHT: 67 IN | DIASTOLIC BLOOD PRESSURE: 64 MMHG | WEIGHT: 172.8 LBS | BODY MASS INDEX: 27.12 KG/M2 | HEART RATE: 92 BPM | TEMPERATURE: 97.8 F

## 2022-05-10 DIAGNOSIS — E55.9 VITAMIN D DEFICIENCY: ICD-10-CM

## 2022-05-10 DIAGNOSIS — E78.2 MIXED HYPERLIPIDEMIA: ICD-10-CM

## 2022-05-10 DIAGNOSIS — R30.0 DYSURIA: ICD-10-CM

## 2022-05-10 DIAGNOSIS — Z00.00 MEDICARE ANNUAL WELLNESS VISIT, SUBSEQUENT: Primary | ICD-10-CM

## 2022-05-10 DIAGNOSIS — Z12.31 ENCOUNTER FOR SCREENING MAMMOGRAM FOR BREAST CANCER: ICD-10-CM

## 2022-05-10 DIAGNOSIS — M54.32 SCIATICA OF LEFT SIDE: ICD-10-CM

## 2022-05-10 DIAGNOSIS — I10 ESSENTIAL (PRIMARY) HYPERTENSION: ICD-10-CM

## 2022-05-10 DIAGNOSIS — M85.80 OSTEOPENIA, UNSPECIFIED LOCATION: ICD-10-CM

## 2022-05-10 DIAGNOSIS — F41.8 DEPRESSION WITH ANXIETY: ICD-10-CM

## 2022-05-10 DIAGNOSIS — H90.3 SENSORINEURAL HEARING LOSS (SNHL) OF BOTH EARS: ICD-10-CM

## 2022-05-10 PROCEDURE — 99214 OFFICE O/P EST MOD 30 MIN: CPT | Performed by: FAMILY MEDICINE

## 2022-05-10 PROCEDURE — 1090F PRES/ABSN URINE INCON ASSESS: CPT | Performed by: FAMILY MEDICINE

## 2022-05-10 PROCEDURE — 1036F TOBACCO NON-USER: CPT | Performed by: FAMILY MEDICINE

## 2022-05-10 PROCEDURE — G8417 CALC BMI ABV UP PARAM F/U: HCPCS | Performed by: FAMILY MEDICINE

## 2022-05-10 PROCEDURE — 1123F ACP DISCUSS/DSCN MKR DOCD: CPT | Performed by: FAMILY MEDICINE

## 2022-05-10 PROCEDURE — G0439 PPPS, SUBSEQ VISIT: HCPCS | Performed by: FAMILY MEDICINE

## 2022-05-10 PROCEDURE — 3017F COLORECTAL CA SCREEN DOC REV: CPT | Performed by: FAMILY MEDICINE

## 2022-05-10 PROCEDURE — G8427 DOCREV CUR MEDS BY ELIG CLIN: HCPCS | Performed by: FAMILY MEDICINE

## 2022-05-10 PROCEDURE — G8399 PT W/DXA RESULTS DOCUMENT: HCPCS | Performed by: FAMILY MEDICINE

## 2022-05-10 RX ORDER — ZOSTER VACCINE RECOMBINANT, ADJUVANTED 50 MCG/0.5
0.5 KIT INTRAMUSCULAR SEE ADMIN INSTRUCTIONS
Qty: 0.5 ML | Refills: 0 | Status: SHIPPED | OUTPATIENT
Start: 2022-05-10 | End: 2022-11-06

## 2022-05-10 ASSESSMENT — PATIENT HEALTH QUESTIONNAIRE - PHQ9
SUM OF ALL RESPONSES TO PHQ QUESTIONS 1-9: 2
SUM OF ALL RESPONSES TO PHQ9 QUESTIONS 1 & 2: 1
8. MOVING OR SPEAKING SO SLOWLY THAT OTHER PEOPLE COULD HAVE NOTICED. OR THE OPPOSITE, BEING SO FIGETY OR RESTLESS THAT YOU HAVE BEEN MOVING AROUND A LOT MORE THAN USUAL: 0
SUM OF ALL RESPONSES TO PHQ QUESTIONS 1-9: 2
6. FEELING BAD ABOUT YOURSELF - OR THAT YOU ARE A FAILURE OR HAVE LET YOURSELF OR YOUR FAMILY DOWN: 0
5. POOR APPETITE OR OVEREATING: 0
1. LITTLE INTEREST OR PLEASURE IN DOING THINGS: 0
7. TROUBLE CONCENTRATING ON THINGS, SUCH AS READING THE NEWSPAPER OR WATCHING TELEVISION: 0
4. FEELING TIRED OR HAVING LITTLE ENERGY: 1
2. FEELING DOWN, DEPRESSED OR HOPELESS: 1
3. TROUBLE FALLING OR STAYING ASLEEP: 0
SUM OF ALL RESPONSES TO PHQ QUESTIONS 1-9: 2
9. THOUGHTS THAT YOU WOULD BE BETTER OFF DEAD, OR OF HURTING YOURSELF: 0
SUM OF ALL RESPONSES TO PHQ QUESTIONS 1-9: 2
10. IF YOU CHECKED OFF ANY PROBLEMS, HOW DIFFICULT HAVE THESE PROBLEMS MADE IT FOR YOU TO DO YOUR WORK, TAKE CARE OF THINGS AT HOME, OR GET ALONG WITH OTHER PEOPLE: 0

## 2022-05-10 ASSESSMENT — LIFESTYLE VARIABLES: HOW OFTEN DO YOU HAVE A DRINK CONTAINING ALCOHOL: NEVER

## 2022-05-10 NOTE — PATIENT INSTRUCTIONS
Personalized Preventive Plan for Jorge Hinds - 5/10/2022  Medicare offers a range of preventive health benefits. Some of the tests and screenings are paid in full while other may be subject to a deductible, co-insurance, and/or copay. Some of these benefits include a comprehensive review of your medical history including lifestyle, illnesses that may run in your family, and various assessments and screenings as appropriate. After reviewing your medical record and screening and assessments performed today your provider may have ordered immunizations, labs, imaging, and/or referrals for you. A list of these orders (if applicable) as well as your Preventive Care list are included within your After Visit Summary for your review. Other Preventive Recommendations:    · A preventive eye exam performed by an eye specialist is recommended every 1-2 years to screen for glaucoma; cataracts, macular degeneration, and other eye disorders. · A preventive dental visit is recommended every 6 months. · Try to get at least 150 minutes of exercise per week or 10,000 steps per day on a pedometer . · Order or download the FREE \"Exercise & Physical Activity: Your Everyday Guide\" from [employer]. Call 8-197.241.4980 or search [employer] online. · You need 8130-5748 mg of calcium and 9305-3166 IU of vitamin D per day. It is possible to meet your calcium requirement with diet alone, but a vitamin D supplement is usually necessary to meet this goal.  · When exposed to the sun, use a sunscreen that protects against both UVA and UVB radiation with an SPF of 30 or greater. Reapply every 2 to 3 hours or after sweating, drying off with a towel, or swimming. · Always wear a seat belt when traveling in a car. Always wear a helmet when riding a bicycle or motorcycle.

## 2022-05-10 NOTE — PROGRESS NOTES
Medicare Annual Wellness Visit    [facility] [place] here for Medicare AWV    Assessment & Plan   Medicare annual wellness visit, subsequent  Encounter for screening mammogram for breast cancer  -     [facility] DIGITAL SCREEN W OR WO CAD BILATERAL; Future      Recommendations for Preventive Services Due: see orders and patient instructions/AVS.  Recommended screening schedule for the next 5-10 years is provided to the patient in written form: see Patient Instructions/AVS.    Mammogram due in July. Cscope UTD. Covid vaccine UTD, had 2nd booster. DEXA UTD. Return for Medicare Annual Wellness Visit in 1 year. Subjective   The following acute and/or chronic problems were also addressed today:    Patient's complete Health Risk Assessment and screening values have been reviewed and are found in Flowsheets. The following problems were reviewed today and where indicated follow up appointments were made and/or referrals ordered. Positive Risk Factor Screenings with Interventions:    Fall Risk:  Do you feel unsteady or are you worried about falling? : no  2 or more falls in past year?: (!) yes  Fall with injury in past year?: no     Fall Risk Interventions:    · Home safety tips provided, drags left foot when tired. ·               General Health and ACP:  General  In general, how would you say your health is?: Good  In the past 7 days, have you experienced any of the following: New or Increased Pain, New or Increased Fatigue, Loneliness, Social Isolation, Stress or Anger?: No  Do you get the social and emotional support that you need?: Yes  Do you have a Living Will?: Yes    Advance Directives     Power of  Living Will ACP-Advance Directive ACP-Power of     Not on File Not on File Not on File Not on File      General Health Risk Interventions:  No further action needed.         Hearing/Vision:  Do you or your family notice any trouble with your hearing that hasn't been managed with hearing aids?: (!) Yes  Do you have difficulty driving, watching TV, or doing any of your daily activities because of your eyesight?: No  Have you had an eye exam within the past year?: Yes  No exam data present    Hearing/Vision Interventions:  · Hearing concerns:  checking annually            Objective   Vitals:    05/10/22 1022   BP: 118/64   Pulse: 92   Temp: 97.8 °F (36.6 °C)   SpO2: 100%   Weight: 172 lb 12.8 oz (78.4 kg)   Height: 5' 7\" (1.702 m)      Body mass index is 27.06 kg/m². General Appearance: alert and oriented to person, place and time, well developed and well- nourished, in no acute distress  Skin: warm and dry, no rash or erythema  Head: normocephalic and atraumatic  Eyes: pupils equal, round, and reactive to light, extraocular eye movements intact, conjunctivae normal  ENT: tympanic membrane, external ear and ear canal normal bilaterally, nose without deformity, nasal mucosa and turbinates normal without polyps  Neck: supple and non-tender without mass, no thyromegaly or thyroid nodules, no cervical lymphadenopathy  Pulmonary/Chest: clear to auscultation bilaterally- no wheezes, rales or rhonchi, normal air movement, no respiratory distress  Cardiovascular: normal rate, regular rhythm, normal S1 and S2, no murmurs, rubs, clicks, or gallops, distal pulses intact, no carotid bruits  Abdomen: soft, non-tender, non-distended, normal bowel sounds, no masses or organomegaly  Extremities: no cyanosis, clubbing or edema  Musculoskeletal: normal range of motion, no joint swelling, deformity or tenderness  Neurologic: reflexes normal and symmetric, no cranial nerve deficit, gait, coordination and speech normal       Allergies   Allergen Reactions    Latex      Prior to Visit Medications    Medication Sig Taking?  Authorizing Provider   lisinopril (PRINIVIL;ZESTRIL) 10 MG tablet TAKE 1 TABLET BY MOUTH EVERY DAY Yes Merly Fernandez MD   montelukast (SINGULAIR) 10 MG tablet TAKE 1 TABLET BY MOUTH EVERY NIGHT Yes Christopher Blood MD   venlafaxine (EFFEXOR XR) 150 MG extended release capsule Take 1 capsule by mouth daily Yes Christopher Blood MD   clotrimazole-betamethasone (LOTRISONE) 1-0.05 % cream APPLY  CREAM TOPICALLY TWICE DAILY Yes HE Cadet   Multiple Vitamins-Minerals (OCUVITE ADULT 50+ PO) Take by mouth daily Yes Historical Provider, MD   zoster recombinant adjuvanted vaccine (SHINGRIX) 50 MCG/0.5ML SUSR injection Inject 0.5 mLs into the muscle See Admin Instructions 1 dose now and repeat in 2-6 months Yes Christopher Blood MD   fexofenadine TY [facility] ALLERGY) 180 MG tablet Take 180 mg by mouth daily Yes Historical Provider, MD   Calcium-Vitamin D (CALTRATE 600 PLUS-VIT D PO) Take  by mouth 2 times daily. Yes Historical Provider, MD   therapeutic multivitamin-minerals (THERAGRAN-M) tablet Take 1 tablet by mouth daily.  Yes Historical Provider, MD   diclofenac sodium (VOLTAREN) 1 % GEL Apply 4 g topically 4 times daily  MD Elida VangTe (Including outside providers/suppliers regularly involved in providing care):   Patient Care Team:  Christopher Blood MD as PCP - General (Family Medicine)  Christopher Blood MD as PCP - [facility] Empaneled Provider  Anderson Rueda as Audiologist (Audiology)  Anderson Rueda as Audiologist (Audiology)  RENAY Chen (Physician Assistant Medical)  Elvis Duval MD as Consulting Physician (Vascular Surgery)    Reviewed and updated this visit:  Tobacco  Allergies  Meds  Med Hx  Surg Hx  Soc Hx  Fam Hx

## 2022-05-10 NOTE — PROGRESS NOTES
[facility] PHYSICIAN SERVICES  [facility]  [id number] [place] [address] Jeyson Roldan [address]  Dept: [phone]  Dept Fax: [phone]  Loc: [phone]     Candy Dickson is a 69 y.o. female who presents today for her medical conditions/complaintsas noted below. Candy Dickson is c/o of Medicare AWV        HPI:   Patient is here for AWV and follow up. Hypertension  Compliant with medications. No adverse effects from medication. No lightheadedness, palpitations, or chest pain. Lab Results   Component Value Date     09/28/2021    K 4.7 09/28/2021     09/28/2021    CO2 30 (H) 09/28/2021    BUN 24 (H) 09/28/2021    CREATININE 0.9 09/28/2021    GLUCOSE 99 09/28/2021    CALCIUM 9.6 09/28/2021    PROT 6.5 (L) 09/28/2021    LABALBU 4.4 09/28/2021    BILITOT 0.6 09/28/2021    ALKPHOS 57 09/28/2021    AST 22 09/28/2021    ALT 21 09/28/2021    LABGLOM >60 09/28/2021    GFRAA >59 09/28/2021       Lab Results   Component Value Date    CHOL 161 09/28/2021    CHOL 168 03/16/2020    CHOL 173 09/19/2019     Lab Results   Component Value Date    TRIG 66 09/28/2021    TRIG 83 03/16/2020    TRIG 83 09/19/2019     Lab Results   Component Value Date    HDL 69 09/28/2021    HDL 67 03/16/2020    HDL 64 (L) 09/19/2019     Lab Results   Component Value Date    LDLCALC 79 09/28/2021    LDLCALC 84 03/16/2020    LDLCALC 92 09/19/2019     No results found for: LABVLDL, VLDL  No results found for: CHOLHDLRATIO  Anxiety  Compliant with medications. No adverse effects from medication. No panic or anxiety attacks since last visit. Symptoms are controlled.             HPI    Past Medical History:   Diagnosis Date    Anxiety     Asymptomatic postmenopausal status (age-related) (natural)     CPAP (continuous positive airway pressure) dependence     11cm    Depression     Headaches due to old head injury     Hyperlipidemia     Hypertension     Joint pain     knee and shoulder    Macular degeneration disease     beginning stage    Neoplasm of uncertain behavior of skin     Obstructive sleep apnea     AHI:  44.5 per PSG, 2/2009    Ringing in ears     Seasonal allergies     Unspecified sleep apnea     uses c-pap    Vitamin D deficiency      Past Surgical History:   Procedure Laterality Date    COLONOSCOPY  10 years    [place]-normal 10 yr recall    DILATION AND CURETTAGE OF UTERUS  2007    DE COLONOSCOPY FLX DX W/COLLJ SPEC WHEN PFRMD N/A 8/16/2018    Dr Harriett Barnes FLX DX W/COLLJ SPEC WHEN PFRMD N/A 8/17/2018    Dr Tamir Naidu, 5 yr recall       Family History   Problem Relation Age of Onset    Stroke Father     Hypertension Father     Other Father         tumor removal, lower abd area    Stroke Mother     Hypertension Mother    Maralyn Caden Mother     Other Maternal Grandmother         hodgkins    Other Maternal Grandfather         leukemia    Cancer Paternal Grandmother 80        pancreatic cancer    Heart Disease Paternal Cousin         paternal side of family    Cancer Paternal Cousin         colon    Breast Cancer Paternal Aunt        Social History     Tobacco Use    Smoking status: Never Smoker    Smokeless tobacco: Never Used   Substance Use Topics    Alcohol use: No      Current Outpatient Medications   Medication Sig Dispense Refill    zoster recombinant adjuvanted vaccine (SHINGRIX) 50 MCG/0.5ML SUSR injection Inject 0.5 mLs into the muscle See Admin Instructions 1 dose now and repeat in 2-6 months 0.5 mL 0    lisinopril (PRINIVIL;ZESTRIL) 10 MG tablet TAKE 1 TABLET BY MOUTH EVERY DAY 90 tablet 2    montelukast (SINGULAIR) 10 MG tablet TAKE 1 TABLET BY MOUTH EVERY NIGHT 90 tablet 1    venlafaxine (EFFEXOR XR) 150 MG extended release capsule Take 1 capsule by mouth daily 90 capsule 3    clotrimazole-betamethasone (LOTRISONE) 1-0.05 % cream APPLY  CREAM TOPICALLY TWICE DAILY 15 g 0    Multiple Vitamins-Minerals ([address] [employer] [place] ADULT 50+ PO) Take by mouth daily      fexofenadine (ALLEGRA ALLERGY) 180 MG tablet Take 180 mg by mouth daily      Calcium-Vitamin D (CALTRATE 600 PLUS-VIT D PO) Take  by mouth 2 times daily.  therapeutic multivitamin-minerals (THERAGRAN-M) tablet Take 1 tablet by mouth daily.  diclofenac sodium (VOLTAREN) 1 % GEL Apply 4 g topically 4 times daily 480 g 2     No current facility-administered medications for this visit. Allergies   Allergen Reactions    Latex        Health Maintenance   Topic Date Due    Shingles vaccine (2 of 3) 06/10/2013    Depression Monitoring  05/10/2023    Annual Wellness Visit (AWV)  05/11/2023    DTaP/Tdap/Td vaccine (2 - Td or Tdap) 07/15/2023    Breast cancer screen  07/22/2023    Colorectal Cancer Screen  08/17/2023    Lipids  09/28/2026    DEXA (modify frequency per FRAX score)  Completed    Flu vaccine  Completed    Pneumococcal 65+ years Vaccine  Completed    COVID-19 Vaccine  Completed    Hepatitis C screen  Completed    Hepatitis A vaccine  Aged Out    Hepatitis B vaccine  Aged Out    Hib vaccine  Aged Out    Meningococcal (ACWY) vaccine  Aged Out       Subjective:     Review of Systems   Constitutional: Negative for chills and fever. HENT: Negative for congestion. Respiratory: Negative for cough, chest tightness and shortness of breath. Cardiovascular: Negative for chest pain, palpitations and leg swelling. Gastrointestinal: Negative for abdominal pain, anal bleeding, constipation, diarrhea and nausea. Genitourinary: Negative for difficulty urinating. Psychiatric/Behavioral: Negative. See HPI for visit specific review of symptoms. All others negative      Objective:   /64   Pulse 92   Temp 97.8 °F (36.6 °C)   Ht 5' 7\" (1.702 m)   Wt 172 lb 12.8 oz (78.4 kg)   SpO2 100%   BMI 27.06 kg/m²    Physical Exam  Constitutional:       Appearance: She is well-developed. She is not ill-appearing.    Eyes:      Pupils: Pupils are equal, round, and reactive to light. Cardiovascular:      Rate and Rhythm: Normal rate and regular rhythm. Heart sounds: No murmur heard. No friction rub. Pulmonary:      Effort: Pulmonary effort is normal. No respiratory distress. Breath sounds: Normal breath sounds. No wheezing or rales. Abdominal:      General: Bowel sounds are normal. There is no distension. Palpations: Abdomen is soft. Tenderness: There is no abdominal tenderness. There is no guarding or rebound. Musculoskeletal:      Cervical back: Normal range of motion and neck supple. Neurological:      Mental Status: She is alert. Psychiatric:         Behavior: Behavior normal.           Lab Review   Recent Results (from the past 672 hour(s))   Urinalysis with Reflex to Culture    Collection Time: 05/12/22 11:15 AM    Specimen: Urine   Result Value Ref Range    Color, UA YELLOW Straw/Yellow    Clarity, UA Clear Clear    Glucose, Ur Negative Negative mg/dL    Bilirubin Urine Negative Negative    Ketones, Urine Negative Negative mg/dL    Specific Gravity, UA 1.019 1.005 - 1.030    Blood, Urine Negative Negative    pH, UA 7.0 5.0 - 8.0    Protein, UA Negative Negative mg/dL    Urobilinogen, Urine 0.2 <2.0 E.U./dL    Nitrite, Urine Negative Negative    Leukocyte Esterase, Urine SMALL (A) Negative   Microscopic Urinalysis    Collection Time: 05/12/22 11:15 AM   Result Value Ref Range    WBC, UA 0-1 0 - 5 /HPF    RBC, UA 0-1 0 - 2 /HPF    Epithelial Cells, UA 0-2 /HPF    Bacteria, UA None Seen (A) None Seen /HPF    Crystals, UA Few Ca. Oxalate (A) None Seen /HPF               Assessment & Plan: The following diagnoses and conditions are stable withno further orders unless indicated:  Jimbo Leger was seen today for medicare awv. Diagnoses and all orders for this visit:    Medicare annual wellness visit, subsequent  See other note.      Encounter for screening mammogram for breast cancer  -     [facility] DIGITAL SCREEN W OR WO CAD BILATERAL; Future    Essential (primary) hypertension  -     Comprehensive Metabolic Panel; Future  Blood pressure is stable. Continue current medications. Monitor ambulatory bp readings, if persistently >140/90, return to clinic. Sciatica of left side  Stretching exercises, will follow. Sensorineural hearing loss (SNHL) of both ears  Will consider audiology follow up. Osteopenia, unspecified location  DEXA UTD. Vitamin D deficiency  -     Vitamin D 25 Hydroxy; Future    Mixed hyperlipidemia  -     Lipid Panel; Future  Stable will work on diet and exercise. Depression with anxiety  Stable, continue same. Dysuria  -     Urinalysis with Reflex to Culture; Future    Other orders  -     zoster recombinant adjuvanted vaccine [facility]) 50 MCG/0.5ML SUSR injection; Inject 0.5 mLs into the muscle See Admin Instructions 1 dose now and repeat in 2-6 months            Return in 6 months (on 11/10/2022). Discussed use, benefit, and side effects of prescribed medications. All patient questions answered. Pt voiced understanding. Reviewed health maintenance. Instructed to continue current medications, diet and exercise. Patient agreedwith treatment plan. Follow up as directed.

## 2022-05-12 DIAGNOSIS — R30.0 DYSURIA: ICD-10-CM

## 2022-05-12 LAB
BACTERIA: ABNORMAL /HPF
BILIRUBIN URINE: NEGATIVE
BLOOD, URINE: NEGATIVE
CLARITY: CLEAR
COLOR: YELLOW
CRYSTALS, UA: ABNORMAL /HPF
EPITHELIAL CELLS, UA: ABNORMAL /HPF
GLUCOSE URINE: NEGATIVE MG/DL
KETONES, URINE: NEGATIVE MG/DL
LEUKOCYTE ESTERASE, URINE: ABNORMAL
NITRITE, URINE: NEGATIVE
PH UA: 7 (ref 5–8)
PROTEIN UA: NEGATIVE MG/DL
RBC UA: ABNORMAL /HPF (ref 0–2)
SPECIFIC GRAVITY UA: 1.02 (ref 1–1.03)
UROBILINOGEN, URINE: 0.2 E.U./DL
WBC UA: ABNORMAL /HPF (ref 0–5)

## 2022-05-24 ASSESSMENT — ENCOUNTER SYMPTOMS
CONSTIPATION: 0
DIARRHEA: 0
COUGH: 0
CHEST TIGHTNESS: 0
ANAL BLEEDING: 0
NAUSEA: 0
ABDOMINAL PAIN: 0
SHORTNESS OF BREATH: 0

## 2022-06-09 RX ORDER — VENLAFAXINE HYDROCHLORIDE 150 MG/1
150 CAPSULE, EXTENDED RELEASE ORAL DAILY
Qty: 90 CAPSULE | Refills: 3 | Status: SHIPPED | OUTPATIENT
Start: 2022-06-09

## 2022-06-12 ENCOUNTER — APPOINTMENT (OUTPATIENT)
Dept: CT IMAGING | Facility: HOSPITAL | Age: 67
End: 2022-06-12

## 2022-06-12 ENCOUNTER — HOSPITAL ENCOUNTER (OUTPATIENT)
Facility: HOSPITAL | Age: 67
Setting detail: OBSERVATION
Discharge: HOME | End: 2022-06-13
Attending: INTERNAL MEDICINE | Admitting: INTERNAL MEDICINE

## 2022-06-12 ENCOUNTER — APPOINTMENT (OUTPATIENT)
Dept: GENERAL RADIOLOGY | Facility: HOSPITAL | Age: 67
End: 2022-06-12

## 2022-06-12 ENCOUNTER — APPOINTMENT (OUTPATIENT)
Dept: NUCLEAR MEDICINE | Facility: HOSPITAL | Age: 67
End: 2022-06-12

## 2022-06-12 DIAGNOSIS — S02.40FA CLOSED FRACTURE OF LEFT ZYGOMATIC ARCH, INITIAL ENCOUNTER: ICD-10-CM

## 2022-06-12 DIAGNOSIS — S02.401A CLOSED FRACTURE OF MAXILLARY SINUS, INITIAL ENCOUNTER: ICD-10-CM

## 2022-06-12 DIAGNOSIS — R04.0 EPISTAXIS DUE TO TRAUMA: ICD-10-CM

## 2022-06-12 DIAGNOSIS — S02.85XA CLOSED FRACTURE OF LEFT ORBIT, INITIAL ENCOUNTER: ICD-10-CM

## 2022-06-12 DIAGNOSIS — R55 SYNCOPE, UNSPECIFIED SYNCOPE TYPE: Primary | ICD-10-CM

## 2022-06-12 LAB
ALBUMIN SERPL-MCNC: 4.8 G/DL (ref 3.5–5.2)
ALBUMIN/GLOB SERPL: 1.8 G/DL
ALP SERPL-CCNC: 67 U/L (ref 39–117)
ALT SERPL W P-5'-P-CCNC: 21 U/L (ref 1–33)
AMPHET+METHAMPHET UR QL: NEGATIVE
AMPHETAMINES UR QL: NEGATIVE
ANION GAP SERPL CALCULATED.3IONS-SCNC: 9 MMOL/L (ref 5–15)
APTT PPP: 26.9 SECONDS (ref 24.1–35)
ARTERIAL PATENCY WRIST A: POSITIVE
AST SERPL-CCNC: 20 U/L (ref 1–32)
ATMOSPHERIC PRESS: 746 MMHG
BACTERIA UR QL AUTO: ABNORMAL /HPF
BARBITURATES UR QL SCN: NEGATIVE
BASE EXCESS BLDA CALC-SCNC: 2.3 MMOL/L (ref 0–2)
BASOPHILS # BLD AUTO: 0.03 10*3/MM3 (ref 0–0.2)
BASOPHILS NFR BLD AUTO: 0.3 % (ref 0–1.5)
BDY SITE: ABNORMAL
BENZODIAZ UR QL SCN: NEGATIVE
BILIRUB SERPL-MCNC: 0.6 MG/DL (ref 0–1.2)
BILIRUB UR QL STRIP: NEGATIVE
BODY TEMPERATURE: 37 C
BUN SERPL-MCNC: 32 MG/DL (ref 8–23)
BUN/CREAT SERPL: 33.7 (ref 7–25)
BUPRENORPHINE SERPL-MCNC: NEGATIVE NG/ML
CALCIUM SPEC-SCNC: 10.1 MG/DL (ref 8.6–10.5)
CANNABINOIDS SERPL QL: NEGATIVE
CHLORIDE SERPL-SCNC: 103 MMOL/L (ref 98–107)
CLARITY UR: CLEAR
CO2 SERPL-SCNC: 30 MMOL/L (ref 22–29)
COCAINE UR QL: NEGATIVE
COLOR UR: YELLOW
CREAT SERPL-MCNC: 0.95 MG/DL (ref 0.57–1)
D DIMER PPP FEU-MCNC: 1.03 MCGFEU/ML (ref 0–0.5)
DEPRECATED RDW RBC AUTO: 41.1 FL (ref 37–54)
EGFRCR SERPLBLD CKD-EPI 2021: 65 ML/MIN/1.73
EOSINOPHIL # BLD AUTO: 0.01 10*3/MM3 (ref 0–0.4)
EOSINOPHIL NFR BLD AUTO: 0.1 % (ref 0.3–6.2)
ERYTHROCYTE [DISTWIDTH] IN BLOOD BY AUTOMATED COUNT: 12.6 % (ref 12.3–15.4)
ETHANOL UR QL: <0.01 %
GLOBULIN UR ELPH-MCNC: 2.6 GM/DL
GLUCOSE SERPL-MCNC: 148 MG/DL (ref 65–99)
GLUCOSE UR STRIP-MCNC: NEGATIVE MG/DL
HCO3 BLDA-SCNC: 27.1 MMOL/L (ref 20–26)
HCT VFR BLD AUTO: 41.9 % (ref 34–46.6)
HGB BLD-MCNC: 14 G/DL (ref 12–15.9)
HGB UR QL STRIP.AUTO: NEGATIVE
HYALINE CASTS UR QL AUTO: ABNORMAL /LPF
IMM GRANULOCYTES # BLD AUTO: 0.02 10*3/MM3 (ref 0–0.05)
IMM GRANULOCYTES NFR BLD AUTO: 0.2 % (ref 0–0.5)
INR PPP: 1 (ref 0.91–1.09)
KETONES UR QL STRIP: ABNORMAL
LEUKOCYTE ESTERASE UR QL STRIP.AUTO: ABNORMAL
LYMPHOCYTES # BLD AUTO: 0.98 10*3/MM3 (ref 0.7–3.1)
LYMPHOCYTES NFR BLD AUTO: 10.2 % (ref 19.6–45.3)
Lab: ABNORMAL
MCH RBC QN AUTO: 30.1 PG (ref 26.6–33)
MCHC RBC AUTO-ENTMCNC: 33.4 G/DL (ref 31.5–35.7)
MCV RBC AUTO: 90.1 FL (ref 79–97)
METHADONE UR QL SCN: NEGATIVE
MODALITY: ABNORMAL
MONOCYTES # BLD AUTO: 0.38 10*3/MM3 (ref 0.1–0.9)
MONOCYTES NFR BLD AUTO: 3.9 % (ref 5–12)
NEUTROPHILS NFR BLD AUTO: 8.22 10*3/MM3 (ref 1.7–7)
NEUTROPHILS NFR BLD AUTO: 85.3 % (ref 42.7–76)
NITRITE UR QL STRIP: NEGATIVE
NRBC BLD AUTO-RTO: 0 /100 WBC (ref 0–0.2)
OPIATES UR QL: NEGATIVE
OXYCODONE UR QL SCN: NEGATIVE
PCO2 BLDA: 41.9 MM HG (ref 35–45)
PCO2 TEMP ADJ BLD: 41.9 MM HG (ref 35–45)
PCP UR QL SCN: NEGATIVE
PH BLDA: 7.42 PH UNITS (ref 7.35–7.45)
PH UR STRIP.AUTO: 5.5 [PH] (ref 5–8)
PH, TEMP CORRECTED: 7.42 PH UNITS (ref 7.35–7.45)
PLATELET # BLD AUTO: 286 10*3/MM3 (ref 140–450)
PMV BLD AUTO: 9.6 FL (ref 6–12)
PO2 BLDA: 80.6 MM HG (ref 83–108)
PO2 TEMP ADJ BLD: 80.6 MM HG (ref 83–108)
POTASSIUM SERPL-SCNC: 4.4 MMOL/L (ref 3.5–5.2)
PROPOXYPH UR QL: NEGATIVE
PROT SERPL-MCNC: 7.4 G/DL (ref 6–8.5)
PROT UR QL STRIP: NEGATIVE
PROTHROMBIN TIME: 12.8 SECONDS (ref 11.9–14.6)
RBC # BLD AUTO: 4.65 10*6/MM3 (ref 3.77–5.28)
RBC # UR STRIP: ABNORMAL /HPF
REF LAB TEST METHOD: ABNORMAL
SAO2 % BLDCOA: 95 % (ref 94–99)
SARS-COV-2 RNA PNL SPEC NAA+PROBE: NOT DETECTED
SODIUM SERPL-SCNC: 142 MMOL/L (ref 136–145)
SP GR UR STRIP: 1.02 (ref 1–1.03)
SQUAMOUS #/AREA URNS HPF: ABNORMAL /HPF
TRICYCLICS UR QL SCN: NEGATIVE
TROPONIN T SERPL-MCNC: <0.01 NG/ML (ref 0–0.03)
TROPONIN T SERPL-MCNC: <0.01 NG/ML (ref 0–0.03)
UROBILINOGEN UR QL STRIP: ABNORMAL
VENTILATOR MODE: ABNORMAL
WBC # UR STRIP: ABNORMAL /HPF
WBC NRBC COR # BLD: 9.64 10*3/MM3 (ref 3.4–10.8)

## 2022-06-12 PROCEDURE — 96375 TX/PRO/DX INJ NEW DRUG ADDON: CPT

## 2022-06-12 PROCEDURE — 70450 CT HEAD/BRAIN W/O DYE: CPT

## 2022-06-12 PROCEDURE — 96376 TX/PRO/DX INJ SAME DRUG ADON: CPT

## 2022-06-12 PROCEDURE — 80053 COMPREHEN METABOLIC PANEL: CPT | Performed by: PHYSICIAN ASSISTANT

## 2022-06-12 PROCEDURE — G0378 HOSPITAL OBSERVATION PER HR: HCPCS

## 2022-06-12 PROCEDURE — 93005 ELECTROCARDIOGRAM TRACING: CPT | Performed by: PHYSICIAN ASSISTANT

## 2022-06-12 PROCEDURE — 78582 LUNG VENTILAT&PERFUS IMAGING: CPT

## 2022-06-12 PROCEDURE — 82803 BLOOD GASES ANY COMBINATION: CPT

## 2022-06-12 PROCEDURE — 25010000002 DEXAMETHASONE PER 1 MG: Performed by: OTOLARYNGOLOGY

## 2022-06-12 PROCEDURE — 96374 THER/PROPH/DIAG INJ IV PUSH: CPT

## 2022-06-12 PROCEDURE — 25010000002 ONDANSETRON PER 1 MG: Performed by: PHYSICIAN ASSISTANT

## 2022-06-12 PROCEDURE — 72125 CT NECK SPINE W/O DYE: CPT

## 2022-06-12 PROCEDURE — 87635 SARS-COV-2 COVID-19 AMP PRB: CPT | Performed by: PHYSICIAN ASSISTANT

## 2022-06-12 PROCEDURE — 0 TECHNETIUM ALBUMIN AGGREGATED: Performed by: PHYSICIAN ASSISTANT

## 2022-06-12 PROCEDURE — 36415 COLL VENOUS BLD VENIPUNCTURE: CPT

## 2022-06-12 PROCEDURE — 85025 COMPLETE CBC W/AUTO DIFF WBC: CPT | Performed by: PHYSICIAN ASSISTANT

## 2022-06-12 PROCEDURE — A9540 TC99M MAA: HCPCS | Performed by: PHYSICIAN ASSISTANT

## 2022-06-12 PROCEDURE — 85730 THROMBOPLASTIN TIME PARTIAL: CPT | Performed by: PHYSICIAN ASSISTANT

## 2022-06-12 PROCEDURE — 70486 CT MAXILLOFACIAL W/O DYE: CPT

## 2022-06-12 PROCEDURE — 25010000002 DEXAMETHASONE PER 1 MG: Performed by: PHYSICIAN ASSISTANT

## 2022-06-12 PROCEDURE — C9803 HOPD COVID-19 SPEC COLLECT: HCPCS

## 2022-06-12 PROCEDURE — 85610 PROTHROMBIN TIME: CPT | Performed by: PHYSICIAN ASSISTANT

## 2022-06-12 PROCEDURE — 99284 EMERGENCY DEPT VISIT MOD MDM: CPT

## 2022-06-12 PROCEDURE — 84484 ASSAY OF TROPONIN QUANT: CPT | Performed by: PHYSICIAN ASSISTANT

## 2022-06-12 PROCEDURE — 71045 X-RAY EXAM CHEST 1 VIEW: CPT

## 2022-06-12 PROCEDURE — 93010 ELECTROCARDIOGRAM REPORT: CPT | Performed by: INTERNAL MEDICINE

## 2022-06-12 PROCEDURE — 93005 ELECTROCARDIOGRAM TRACING: CPT | Performed by: INTERNAL MEDICINE

## 2022-06-12 PROCEDURE — 25010000002 METOCLOPRAMIDE PER 10 MG: Performed by: PHYSICIAN ASSISTANT

## 2022-06-12 PROCEDURE — 36600 WITHDRAWAL OF ARTERIAL BLOOD: CPT

## 2022-06-12 PROCEDURE — 85379 FIBRIN DEGRADATION QUANT: CPT | Performed by: PHYSICIAN ASSISTANT

## 2022-06-12 PROCEDURE — 80306 DRUG TEST PRSMV INSTRMNT: CPT | Performed by: PHYSICIAN ASSISTANT

## 2022-06-12 PROCEDURE — 84443 ASSAY THYROID STIM HORMONE: CPT | Performed by: NURSE PRACTITIONER

## 2022-06-12 PROCEDURE — 81001 URINALYSIS AUTO W/SCOPE: CPT | Performed by: PHYSICIAN ASSISTANT

## 2022-06-12 PROCEDURE — 93005 ELECTROCARDIOGRAM TRACING: CPT

## 2022-06-12 PROCEDURE — 82077 ASSAY SPEC XCP UR&BREATH IA: CPT | Performed by: PHYSICIAN ASSISTANT

## 2022-06-12 RX ORDER — VENLAFAXINE HYDROCHLORIDE 75 MG/1
150 CAPSULE, EXTENDED RELEASE ORAL DAILY
Status: DISCONTINUED | OUTPATIENT
Start: 2022-06-12 | End: 2022-06-13 | Stop reason: HOSPADM

## 2022-06-12 RX ORDER — SODIUM CHLORIDE 0.9 % (FLUSH) 0.9 %
10 SYRINGE (ML) INJECTION AS NEEDED
Status: DISCONTINUED | OUTPATIENT
Start: 2022-06-12 | End: 2022-06-13 | Stop reason: HOSPADM

## 2022-06-12 RX ORDER — DEXAMETHASONE SODIUM PHOSPHATE 10 MG/ML
10 INJECTION INTRAMUSCULAR; INTRAVENOUS ONCE
Status: COMPLETED | OUTPATIENT
Start: 2022-06-12 | End: 2022-06-12

## 2022-06-12 RX ORDER — ONDANSETRON 2 MG/ML
4 INJECTION INTRAMUSCULAR; INTRAVENOUS ONCE
Status: DISCONTINUED | OUTPATIENT
Start: 2022-06-12 | End: 2022-06-12 | Stop reason: SDUPTHER

## 2022-06-12 RX ORDER — LISINOPRIL 10 MG/1
10 TABLET ORAL DAILY
COMMUNITY

## 2022-06-12 RX ORDER — METOCLOPRAMIDE HYDROCHLORIDE 5 MG/ML
10 INJECTION INTRAMUSCULAR; INTRAVENOUS EVERY 6 HOURS
Status: DISCONTINUED | OUTPATIENT
Start: 2022-06-12 | End: 2022-06-12

## 2022-06-12 RX ORDER — DEXAMETHASONE SODIUM PHOSPHATE 4 MG/ML
6 INJECTION, SOLUTION INTRA-ARTICULAR; INTRALESIONAL; INTRAMUSCULAR; INTRAVENOUS; SOFT TISSUE EVERY 6 HOURS
Status: DISCONTINUED | OUTPATIENT
Start: 2022-06-12 | End: 2022-06-13 | Stop reason: HOSPADM

## 2022-06-12 RX ORDER — PHENOL 1.4 %
600 AEROSOL, SPRAY (ML) MUCOUS MEMBRANE DAILY
COMMUNITY

## 2022-06-12 RX ORDER — OXYMETAZOLINE HYDROCHLORIDE 0.05 G/100ML
2 SPRAY NASAL 3 TIMES DAILY
Status: DISCONTINUED | OUTPATIENT
Start: 2022-06-12 | End: 2022-06-13 | Stop reason: HOSPADM

## 2022-06-12 RX ORDER — MONTELUKAST SODIUM 10 MG/1
10 TABLET ORAL NIGHTLY
Status: DISCONTINUED | OUTPATIENT
Start: 2022-06-12 | End: 2022-06-13 | Stop reason: HOSPADM

## 2022-06-12 RX ORDER — SODIUM CHLORIDE 0.9 % (FLUSH) 0.9 %
10 SYRINGE (ML) INJECTION EVERY 12 HOURS SCHEDULED
Status: DISCONTINUED | OUTPATIENT
Start: 2022-06-12 | End: 2022-06-13 | Stop reason: HOSPADM

## 2022-06-12 RX ORDER — VENLAFAXINE HYDROCHLORIDE 150 MG/1
150 CAPSULE, EXTENDED RELEASE ORAL DAILY
COMMUNITY

## 2022-06-12 RX ORDER — LISINOPRIL 10 MG/1
10 TABLET ORAL DAILY
Status: DISCONTINUED | OUTPATIENT
Start: 2022-06-12 | End: 2022-06-13 | Stop reason: HOSPADM

## 2022-06-12 RX ORDER — ONDANSETRON 2 MG/ML
4 INJECTION INTRAMUSCULAR; INTRAVENOUS ONCE
Status: COMPLETED | OUTPATIENT
Start: 2022-06-12 | End: 2022-06-12

## 2022-06-12 RX ORDER — OMEGA-3S/DHA/EPA/FISH OIL/D3 300MG-1000
400 CAPSULE ORAL DAILY
Status: DISCONTINUED | OUTPATIENT
Start: 2022-06-12 | End: 2022-06-13 | Stop reason: HOSPADM

## 2022-06-12 RX ORDER — MULTIPLE VITAMINS W/ MINERALS TAB 9MG-400MCG
1 TAB ORAL DAILY
COMMUNITY

## 2022-06-12 RX ORDER — CETIRIZINE HYDROCHLORIDE 10 MG/1
10 TABLET ORAL DAILY
Status: DISCONTINUED | OUTPATIENT
Start: 2022-06-12 | End: 2022-06-13 | Stop reason: HOSPADM

## 2022-06-12 RX ORDER — ECHINACEA PURPUREA EXTRACT 125 MG
2 TABLET ORAL
Status: DISCONTINUED | OUTPATIENT
Start: 2022-06-12 | End: 2022-06-13 | Stop reason: HOSPADM

## 2022-06-12 RX ORDER — FEXOFENADINE HCL 180 MG/1
180 TABLET ORAL DAILY
COMMUNITY

## 2022-06-12 RX ORDER — ECHINACEA PURPUREA EXTRACT 125 MG
2 TABLET ORAL CONTINUOUS PRN
Status: DISCONTINUED | OUTPATIENT
Start: 2022-06-12 | End: 2022-06-13 | Stop reason: HOSPADM

## 2022-06-12 RX ORDER — MONTELUKAST SODIUM 10 MG/1
10 TABLET ORAL NIGHTLY
COMMUNITY

## 2022-06-12 RX ORDER — MULTIPLE VITAMINS W/ MINERALS TAB 9MG-400MCG
1 TAB ORAL DAILY
Status: DISCONTINUED | OUTPATIENT
Start: 2022-06-12 | End: 2022-06-13 | Stop reason: HOSPADM

## 2022-06-12 RX ORDER — OMEGA-3S/DHA/EPA/FISH OIL/D3 300MG-1000
400 CAPSULE ORAL DAILY
COMMUNITY

## 2022-06-12 RX ADMIN — KIT FOR THE PREPARATION OF TECHNETIUM TC 99M ALBUMIN AGGREGATED 1 DOSE: 2.5 INJECTION, POWDER, FOR SOLUTION INTRAVENOUS at 11:20

## 2022-06-12 RX ADMIN — SALINE NASAL SPRAY 2 SPRAY: 1.5 SOLUTION NASAL at 21:44

## 2022-06-12 RX ADMIN — CETIRIZINE HYDROCHLORIDE 10 MG: 10 TABLET ORAL at 16:49

## 2022-06-12 RX ADMIN — DEXAMETHASONE SODIUM PHOSPHATE 10 MG: 10 INJECTION INTRAMUSCULAR; INTRAVENOUS at 12:39

## 2022-06-12 RX ADMIN — METOCLOPRAMIDE 10 MG: 5 INJECTION, SOLUTION INTRAMUSCULAR; INTRAVENOUS at 13:09

## 2022-06-12 RX ADMIN — CHOLECALCIFEROL TAB 10 MCG (400 UNIT) 400 UNITS: 10 TAB at 16:49

## 2022-06-12 RX ADMIN — Medication 1 TABLET: at 16:49

## 2022-06-12 RX ADMIN — DEXAMETHASONE SODIUM PHOSPHATE 6 MG: 4 INJECTION, SOLUTION INTRA-ARTICULAR; INTRALESIONAL; INTRAMUSCULAR; INTRAVENOUS; SOFT TISSUE at 21:44

## 2022-06-12 RX ADMIN — Medication 2 SPRAY: at 21:44

## 2022-06-12 RX ADMIN — PHENYLEPHRINE HYDROCHLORIDE 2 SPRAY: 0.5 SPRAY NASAL at 09:39

## 2022-06-12 RX ADMIN — ONDANSETRON 4 MG: 2 INJECTION INTRAMUSCULAR; INTRAVENOUS at 09:34

## 2022-06-12 RX ADMIN — SODIUM CHLORIDE, POTASSIUM CHLORIDE, SODIUM LACTATE AND CALCIUM CHLORIDE 1000 ML: 600; 310; 30; 20 INJECTION, SOLUTION INTRAVENOUS at 09:38

## 2022-06-12 RX ADMIN — MONTELUKAST SODIUM 10 MG: 10 TABLET, FILM COATED ORAL at 20:46

## 2022-06-12 RX ADMIN — Medication 10 ML: at 21:45

## 2022-06-12 RX ADMIN — VENLAFAXINE HYDROCHLORIDE 150 MG: 75 CAPSULE, EXTENDED RELEASE ORAL at 16:49

## 2022-06-12 NOTE — ED PROVIDER NOTES
Subjective   History of Present Illness    Patient is a 67-year-old female presenting to ED with left-sided facial trauma, nausea, vomiting after syncopal event.  PMH significant for sleep apnea with use of CPAP, hypertension, anxiety. Patient states that she remembers waking up in her recliner around 1230 last night as her dog was barking at her at which time she stood up, felt dizzy like she was going to pass out, and noticed that she had a cut and a black eye on the left side.  Patient states that she does not remember much after that however this morning she woke up in her bed continuing to feel dizzy with positional changes, developed nausea and vomiting, as well as began having intermittent left-sided nosebleeding.  Patient reports that she has significant bruising and swelling around her left eye as well as swelling which extends along the left cheek.  Patient describes feeling soreness along her left arm and leg with no injuries or focal tenderness.  Patient states initially she thought her symptoms were related to not wearing her CPAP however when she realized the facial trauma she contacted her friend to bring her to the ER for further evaluation.  Patient reports she has been feeling well the past couple days but has felt rundown as her mother is currently in a nursing home and she is going back and forth.  Patient denies any illnesses or injuries prior to last night.  Patient reports that she lives at home with her dog but does not believe there is any evidence she tripped over him and she is not sure what she fell into.  Patient denies any history of similar syncopal events.  Upon arrival to ED patient denies chest pain/pressure/heaviness/tightness, shortness of breath, difficulties breathing, abdominal pain, or any focal neurological deficits.  Patient denies decreased vision, blurry vision, diplopia, tinnitus, or hearing difficulties.  Patient denies use of any blood thinners.    Records reviewed show  no previous ED visits.    Patient last in the outpatient setting on 5/10/2022 at the PCP office for an annual wellness visit and management of chronic illnesses.    Review of Systems   Constitutional: Positive for fatigue (Over past few days). Negative for chills, diaphoresis and fever.   HENT: Positive for facial swelling (Left-sided) and nosebleeds (Left-sided). Negative for dental problem, hearing loss and tinnitus.    Eyes: Negative.  Negative for photophobia, pain and visual disturbance (Denies decreased vision, blurry vision, diplopia).   Respiratory: Negative.  Negative for cough, chest tightness and shortness of breath.    Cardiovascular: Negative.  Negative for chest pain and palpitations.   Gastrointestinal: Positive for nausea and vomiting. Negative for abdominal pain.   Genitourinary: Negative.  Negative for dysuria, flank pain and hematuria.   Musculoskeletal: Negative.  Negative for arthralgias, back pain and neck pain.   Skin: Positive for color change (bruising, left eye). Negative for wound.   Neurological: Positive for dizziness (with positional changes), syncope (possible) and headaches. Negative for weakness and numbness.        Reports + head injury   Psychiatric/Behavioral: Negative.    All other systems reviewed and are negative.      Past Medical History:   Diagnosis Date   • Anxiety    • Hypertension    • MARYBETH on CPAP        No Known Allergies    History reviewed. No pertinent surgical history.    History reviewed. No pertinent family history.    Social History     Socioeconomic History   • Marital status: Single   Tobacco Use   • Smoking status: Never Smoker   • Smokeless tobacco: Never Used   Substance and Sexual Activity   • Alcohol use: Not Currently           Objective   Physical Exam  Vitals and nursing note reviewed.   Constitutional:       General: She is in acute distress (unpon initial evaluation active epistaxis and vomiting, appears nausoues and uncomfortable due to pain).       Appearance: Normal appearance. She is well-developed, well-groomed and overweight. She is not diaphoretic.      Interventions: Cervical collar in place.   HENT:      Head: Normocephalic. Contusion present. No abrasion or laceration.      Jaw: Tenderness (left sided), swelling (left sided) and pain on movement (left sided) present. No trismus or malocclusion.      Right Ear: Tympanic membrane, ear canal and external ear normal. No hemotympanum.      Left Ear: Tympanic membrane, ear canal and external ear normal. No hemotympanum.      Nose: No signs of injury or nasal tenderness.      Right Nostril: No epistaxis.      Left Nostril: Epistaxis (anterior) present.      Right Sinus: No maxillary sinus tenderness or frontal sinus tenderness.      Left Sinus: Maxillary sinus tenderness present. No frontal sinus tenderness.      Mouth/Throat:      Lips: Pink.      Mouth: Mucous membranes are moist. No injury.      Pharynx: Oropharynx is clear.      Comments: No intraoral, dental, tongue injuries  Eyes:      General: Vision grossly intact.      Extraocular Movements: Extraocular movements intact.      Right eye: No nystagmus.      Left eye: No nystagmus.      Conjunctiva/sclera: Conjunctivae normal.      Right eye: No hemorrhage.     Left eye: No hemorrhage.     Pupils: Pupils are equal, round, and reactive to light.      Comments: Bruising, swelling, tenderness to palpitation to left periorbital region with no abnormalities to bilateral conjunctive a.  No pain upon extraocular movements bilaterally.  Normal inspection of right periorbital region.   Cardiovascular:      Rate and Rhythm: Regular rhythm. Tachycardia present.   Pulmonary:      Effort: Pulmonary effort is normal.      Breath sounds: Normal breath sounds.   Chest:      Chest wall: No tenderness.   Abdominal:      General: Bowel sounds are normal.      Palpations: Abdomen is soft.      Tenderness: There is no abdominal tenderness.   Musculoskeletal:          General: No tenderness or signs of injury. Normal range of motion.      Cervical back: No tenderness.      Right lower leg: No edema.      Left lower leg: No edema.   Skin:     General: Skin is warm.      Findings: Bruising (As described in HEENT section) and signs of injury present. No abrasion or laceration.   Neurological:      General: No focal deficit present.      Mental Status: She is alert and oriented to person, place, and time. Mental status is at baseline.      GCS: GCS eye subscore is 4. GCS verbal subscore is 5. GCS motor subscore is 6.      Sensory: Sensation is intact.      Motor: Motor function is intact.      Coordination: Coordination is intact.      Gait: Gait normal.   Psychiatric:         Attention and Perception: Attention normal.         Mood and Affect: Mood and affect normal.         Speech: Speech normal.         Behavior: Behavior normal. Behavior is cooperative.         Procedures           ED Course                                                     MDM  Number of Diagnoses or Management Options     Amount and/or Complexity of Data Reviewed  Clinical lab tests: ordered and reviewed  Tests in the radiology section of CPT®: reviewed and ordered  Tests in the medicine section of CPT®: reviewed and ordered  Decide to obtain previous medical records or to obtain history from someone other than the patient: yes  Review and summarize past medical records: yes  Discuss the patient with other providers: yes (Dr. Tom Gutierrez (attending)  Dr. De Los Santos (ENT)  Dr. Batista (hospitalist))    Patient Progress  Patient progress: improved    Patient is a 67-year-old female presenting to ED with left-sided facial trauma, nausea, vomiting after syncopal event.  PMH significant for sleep apnea with use of CPAP, hypertension, anxiety.  CBC with no acute findings.  CP with hyperglycemia 148 and no further electrolyte abnormalities, normal renal and hepatic function.  PT/INR WNL.  Alcohol level negative.   D-dimer elevated at 1.03.Urinalysis with small leukocytes and 0-2 WBC however no bacteria, negative nitrates.  UDS negative.  Troponin negative x2.  ABG with slightly decreased oxygen 80.6 and no further acute abnormalities. Head CT showed: No acute intracranial abnormalities.  Maxillofacial sinuses CT showed: Maxillary sinus, left zygomatic arch, and left bony orbit fractures.  Cervical spine CT showed: No acute fractures.  Chest x-ray showed: No acute disease.  VQ scan showed: Low probability for pulmonary embolism.  Case discussed with Dr. De Los Santos, ENT.  Will kindly follow patient for discussion of further surgical intervention and recommends 10 mg Decadron.  Patient given a dose of Zofran and had improvement of her nausea temporarily for which it did return and she was then given Reglan.  Patient given IV fluid bolus.  Afrin was applied as well as a nasal clamp and patient had resolution of her left-sided anterior epistaxis with no further epistaxis.  Discussed with patient inpatient versus outpatient follow-up and treatment for syncopal evaluation for which she wishes to proceed with inpatient.  Case was discussed with Dr. Hospitalist, who will kindly accept patient for admission under the services of Dr. Aguirre    Final diagnoses:   Syncope, unspecified syncope type   Closed fracture of maxillary sinus, initial encounter (HCC)   Closed fracture of left zygomatic arch, initial encounter (HCC)   Closed fracture of left orbit, initial encounter (HCC)   Epistaxis due to trauma       ED Disposition  ED Disposition     ED Disposition   Decision to Admit    Condition   --    Comment   Level of Care: Med/Surg [1]   Diagnosis: Syncope, unspecified syncope type [[id number]]   Admitting Physician: DUKE AGUIRRE [[id number]]               No follow-up provider specified.       Medication List      No changes were made to your prescriptions during this visit.          Diaz Angel PA-C  06/12/22 [id number]

## 2022-06-12 NOTE — H&P
"    [facility]  HISTORY AND PHYSICAL    Date of Admission: 6/12/2022  Primary Care Physician: Vera Ford MD    Subjective     Chief Complaint: Syncope  History of Present Illness  I am asked to admit the patient for ENT service following a syncopal episode resulting to left maxillary sinus, left zygomatic arch and left orbital fractures.  Other CT imaging of cervical spine showed no acute fracture; no acute intracranial abnormality CT of the head.  Patient had positive D-dimer which on nuclear imaging study showed low probability for PE  EKG showed normal sinus rhythm with no acute ST-T wave changes.    Patient has no complete recollection of the event that happened.  She told me that around 12:30 AM her dog was barking and she went to the bathroom by the time she saw herself in the mirror, she noticed blood on left side of her face.  She used the commode and felt weak dizzy and subsequently went to bed.  She woke up around 4 AM not feeling great.  She went back to sleep and subsequently woke up at 7 AM with swelling on her left side of her face, \"head felt awful\", dizzy and weak.  She carries history of sleep apnea.  She thought her headache is related to not using her CPAP.    She denies any chest pain shortness of breath palpitation visual disturbance, tinnitus focal weakness slurred speech.  States that she tripped and fallen in the past but had not resulted to broken bone.  She lives by herself and has a dog.    Review of Systems   Denies any fever chills  Denies any bowel disturbance  States that she is not able to hold her bladder for long period of time as she used to but has not mentioned any incontinence.    Otherwise complete ROS reviewed and negative except as mentioned in the HPI.    Past Medical History:   Past Medical History:   Diagnosis Date   • Anxiety    • Hypertension    • MARYBETH on CPAP      Past Surgical History:History reviewed. No " "pertinent surgical history.  Social History:  reports that she has never smoked. She has never used smokeless tobacco. She reports previous alcohol use.    Family History:   mom lives in a nursing facility.  She has COPD sleep apnea and stroke; dad passed away at age 79      Allergies:  No Known Allergies    Medications:  Prior to Admission medications    Medication Sig Start Date End Date Taking? Authorizing Provider   calcium carbonate (OS-ELDER) 600 MG tablet Take 600 mg by mouth Daily.    Janette Blanco MD   cholecalciferol (VITAMIN D3) 10 MCG (400 UNIT) tablet Take 400 Units by mouth Daily.    Janette Blanco MD   fexofenadine (ALLEGRA) 180 MG tablet Take 180 mg by mouth Daily.    Janette Blanco MD   lisinopril (PRINIVIL,ZESTRIL) 10 MG tablet Take 10 mg by mouth Daily.    Janette Blanco MD   montelukast (SINGULAIR) 10 MG tablet Take 10 mg by mouth Every Night.    Janette Blanco MD   Multiple Vitamins-Minerals (OCUVITE EYE HEALTH FORMULA PO) Take  by mouth.    Janette Blanco MD   multivitamin with minerals (CENTRUM ADULTS PO) Take 1 tablet by mouth Daily.    Janette Blanco MD   venlafaxine XR (EFFEXOR-XR) 150 MG 24 hr capsule Take 150 mg by mouth Daily.    Janette Blanco MD     I have utilized all available immediate resources to obtain, update, and review the patient's current medications.    Objective     Vital Signs: /65   Pulse 98   Temp 98 °F (36.7 °C) (Oral)   Resp 18   Ht 170.2 cm (67\")   Wt 77.1 kg (170 lb)   SpO2 96%   BMI 26.63 kg/m²   Physical Exam   GEN: Awake, alert, interactive, in NAD  HEENT: Atraumatic, PERRLA, EOMI, Anicteric, Trachea midline  Lungs: CTAB, no wheezing/rales/rhonchi  Heart: RRR, +S1/s2, no rub, no murmur  ABD: soft, nt/nd, +BS, no guarding/rebound  Extremities: atraumatic, no cyanosis, no edema  Skin: no rashes or lesions  Neuro: AAOx3, no focal deficits.  Neurologically intact  Psych: normal mood & " affect      Patient has periorbital ecchymosis as shown below.  There appears to be a small laceration which did not appear to requiring any stitches or tape.  Patient agreeable to have her photo taken and placed here.  I did confirm with her name.      Results Reviewed:  Lab Results (last 24 hours)     Procedure Component Value Units Date/Time    Troponin [[id number]]  (Normal) Collected: 06/12/22 1152    Specimen: Blood Updated: 06/12/22 1225     Troponin T <0.010 ng/mL     Narrative:      Troponin T Reference Range:  <= 0.03 ng/mL-   Negative for AMI  >0.03 ng/mL-     Abnormal for myocardial necrosis.  Clinicians would have to utilize clinical acumen, EKG, Troponin and serial changes to determine if it is an Acute Myocardial Infarction or myocardial injury due to an underlying chronic condition.       Results may be falsely decreased if patient taking Biotin.      Urinalysis, Microscopic Only - Urine, Clean Catch [[id number]]  (Abnormal) Collected: 06/12/22 1032    Specimen: Urine, Clean Catch Updated: 06/12/22 1124     RBC, UA 3-5 /HPF      WBC, UA 0-2 /HPF      Comment: Urine culture not indicated.        Bacteria, UA None Seen /HPF      Squamous Epithelial Cells, UA 0-2 /HPF      Hyaline Casts, UA 0-2 /LPF      Methodology Automated Microscopy    Urine Drug Screen - Urine, Clean Catch [[id number]]  (Normal) Collected: 06/12/22 1032    Specimen: Urine, Clean Catch Updated: 06/12/22 1109     THC, Screen, Urine Negative     Phencyclidine (PCP), Urine Negative     Cocaine Screen, Urine Negative     Methamphetamine, Ur Negative     Opiate Screen Negative     Amphetamine Screen, Urine Negative     Benzodiazepine Screen, Urine Negative     Tricyclic Antidepressants Screen Negative     Methadone Screen, Urine Negative     Barbiturates Screen, Urine Negative     Oxycodone Screen, Urine Negative     Propoxyphene Screen Negative     Buprenorphine, Screen, Urine Negative    Narrative:      Cutoff For Drugs  Screened:    Amphetamines               500 ng/ml  Barbiturates               200 ng/ml  Benzodiazepines            150 ng/ml  Cocaine                    150 ng/ml  Methadone                  200 ng/ml  Opiates                    100 ng/ml  Phencyclidine               25 ng/ml  THC                            50 ng/ml  Methamphetamine            500 ng/ml  Tricyclic Antidepressants  300 ng/ml  Oxycodone                  100 ng/ml  Propoxyphene               300 ng/ml  Buprenorphine               10 ng/ml    The normal value for all drugs tested is negative. This report includes unconfirmed screening results, with the cutoff values listed, to be used for medical treatment purposes only.  Unconfirmed results must not be used for non-medical purposes such as employment or legal testing.  Clinical consideration should be applied to any drug of abuse test, particularly when unconfirmed results are used.      Urinalysis With Culture If Indicated - Urine, Clean Catch [[id number]]  (Abnormal) Collected: 06/12/22 1032    Specimen: Urine, Clean Catch Updated: 06/12/22 1100     Color, UA Yellow     Appearance, UA Clear     pH, UA 5.5     Specific Gravity, UA 1.025     Glucose, UA Negative     Ketones, UA Trace     Bilirubin, UA Negative     Blood, UA Negative     Protein, UA Negative     Leuk Esterase, UA Small (1+)     Nitrite, UA Negative     Urobilinogen, UA 1.0 E.U./dL    Narrative:      In absence of clinical symptoms, the presence of pyuria, bacteria, and/or nitrites on the urinalysis result does not correlate with infection.    Comprehensive Metabolic Panel [[id number]]  (Abnormal) Collected: 06/12/22 0930    Specimen: Blood Updated: 06/12/22 1006     Glucose 148 mg/dL      BUN 32 mg/dL      Creatinine 0.95 mg/dL      Sodium 142 mmol/L      Potassium 4.4 mmol/L      Chloride 103 mmol/L      CO2 30.0 mmol/L      Calcium 10.1 mg/dL      Total Protein 7.4 g/dL      Albumin 4.80 g/dL      ALT (SGPT) 21 U/L      AST (SGOT) 20  U/L      Alkaline Phosphatase 67 U/L      Total Bilirubin 0.6 mg/dL      Globulin 2.6 gm/dL      A/G Ratio 1.8 g/dL      BUN/Creatinine Ratio 33.7     Anion Gap 9.0 mmol/L      eGFR 65.0 mL/min/1.73      Comment: National Kidney Foundation and American Society of Nephrology (ASN) Task Force recommended calculation based on the Chronic Kidney Disease Epidemiology Collaboration (CKD-EPI) equation refit without adjustment for race.       Narrative:      GFR Normal >60  Chronic Kidney Disease <60  Kidney Failure <15      Troponin [[id number]]  (Normal) Collected: 06/12/22 0930    Specimen: Blood Updated: 06/12/22 1003     Troponin T <0.010 ng/mL     Narrative:      Troponin T Reference Range:  <= 0.03 ng/mL-   Negative for AMI  >0.03 ng/mL-     Abnormal for myocardial necrosis.  Clinicians would have to utilize clinical acumen, EKG, Troponin and serial changes to determine if it is an Acute Myocardial Infarction or myocardial injury due to an underlying chronic condition.       Results may be falsely decreased if patient taking Biotin.      Ethanol [[id number]] Collected: 06/12/22 0930    Specimen: Blood Updated: 06/12/22 1002     Ethanol % <0.010 %     Narrative:      Not for legal purposes. Chain of Custody not followed.     Protime-INR [[id number]]  (Normal) Collected: 06/12/22 0930    Specimen: Blood Updated: 06/12/22 0958     Protime 12.8 Seconds      INR 1.00    D-dimer, Quantitative [[id number]]  (Abnormal) Collected: 06/12/22 0930    Specimen: Blood Updated: 06/12/22 0958     D-Dimer, Quantitative 1.03 MCGFEU/mL     Narrative:      Reference Range is 0-0.50 MCGFEU/mL. However, results <0.50 MCGFEU/mL tends to rule out DVT or PE. Results >0.50 MCGFEU/mL are not useful in predicting absence or presence of DVT or PE.      aPTT [[id number]]  (Normal) Collected: 06/12/22 0930    Specimen: Blood Updated: 06/12/22 0958     PTT 26.9 seconds     Blood Gas, Arterial - [[id number]]  (Abnormal) Collected: 06/12/22 0947     Specimen: Arterial Blood Updated: 06/12/22 0948     Site Left Radial     Giovany's Test Positive     pH, Arterial 7.419 pH units      pCO2, Arterial 41.9 mm Hg      pO2, Arterial 80.6 mm Hg      Comment: 84 Value below reference range        HCO3, Arterial 27.1 mmol/L      Comment: 83 Value above reference range        Base Excess, Arterial 2.3 mmol/L      Comment: 83 Value above reference range        O2 Saturation, Arterial 95.0 %      Temperature 37.0 C      Barometric Pressure for Blood Gas 746 mmHg      Modality Room Air     Ventilator Mode NA     Collected by [id number]     Comment: Meter: [id number]     :  [id number]        pCO2, Temperature Corrected 41.9 mm Hg      pH, Temp Corrected 7.419 pH Units      pO2, Temperature Corrected 80.6 mm Hg     CBC & Differential [[id number]]  (Abnormal) Collected: 06/12/22 0930    Specimen: Blood Updated: 06/12/22 0946    Narrative:      The following orders were created for panel order CBC & Differential.  Procedure                               Abnormality         Status                     ---------                               -----------         ------                     CBC Auto Differential[[id number]]        Abnormal            Final result                 Please view results for these tests on the individual orders.    CBC Auto Differential [[id number]]  (Abnormal) Collected: 06/12/22 0930    Specimen: Blood Updated: 06/12/22 0946     WBC 9.64 10*3/mm3      RBC 4.65 10*6/mm3      Hemoglobin 14.0 g/dL      Hematocrit 41.9 %      MCV 90.1 fL      MCH 30.1 pg      MCHC 33.4 g/dL      RDW 12.6 %      RDW-SD 41.1 fl      MPV 9.6 fL      Platelets 286 10*3/mm3      Neutrophil % 85.3 %      Lymphocyte % 10.2 %      Monocyte % 3.9 %      Eosinophil % 0.1 %      Basophil % 0.3 %      Immature Grans % 0.2 %      Neutrophils, Absolute 8.22 10*3/mm3      Lymphocytes, Absolute 0.98 10*3/mm3      Monocytes, Absolute 0.38 10*3/mm3      Eosinophils, Absolute 0.01 10*3/mm3       Basophils, Absolute 0.03 10*3/mm3      Immature Grans, Absolute 0.02 10*3/mm3      nRBC 0.0 /100 WBC     COVID-19,[facility],Nasal Swab No Transport Media 3-4 HR TAT - Swab, Nasal Cavity [[id number]]  (Normal) Collected: 06/12/22 0907    Specimen: Swab from Nasal Cavity Updated: 06/12/22 0944     COVID19 Not Detected    Narrative:      Fact sheet for providers: https://www.fda.gov/media/766289/download     Fact sheet for patients: https://www.fda.gov/media/537259/download    Test performed by PCR.    Consider negative results in combination with clinical observations, patient history, and epidemiological information.        Imaging Results (Last 24 Hours)     Procedure Component Value Units Date/Time    NM Lung Ventilation Perfusion [[id number]] Collected: 06/12/22 1148     Updated: 06/12/22 1151    Narrative:      EXAMINATION: NM LUNG VENTILATION PERFUSION-     6/12/2022 11:03 AM CDT     HISTORY: syncope, elevated d dimer; R55-Syncope and collapse;  S02.401A-Maxillary fracture, unspecified side, initial encounter for  closed fracture; S02.40FA-Zygomatic fracture, left side, initial  encounter for closed fracture; S02.85XA-Fracture of orbit, unspecified,  initial encounter for closed fracture; R04.0-Epistaxis     Comparison is made with a chest x-ray from earlier today at 10:08 AM.     Nuclear medicine perfusion lung scan.     Dose: 4.0 mCi technetium MAA.  Route of administration: IV injection.     Homogeneous perfusion imaging with no focal peripheral defect to  indicate pulmonary embolism.     Summary:  1. Low probability for pulmonary embolism.     This report was finalized on 06/12/2022 11:48 by Dr. Stephen Corbett MD.    XR Chest 1 View [[id number]] Collected: 06/12/22 1019     Updated: 06/12/22 1023    Narrative:      EXAMINATION: XR CHEST 1 VW-     6/12/2022 10:08 AM     HISTORY: Syncope. Fall injury. Head and neck trauma.     One view chest x-ray.     Heart size is normal.  The mediastinum is within  normal limits.      The lungs are mildly hyperexpanded with no pneumonia or pneumothorax.  Mild chronic appearing interstitial disease with a few calcified  granulomas.  No congestive failure changes.                                                                       Impression:      1. No acute disease.           This report was finalized on 06/12/2022 10:20 by Dr. Stephen Corbett MD.    CT Maxillofacial Without Contrast [[id number]] Collected: 06/12/22 1010     Updated: 06/12/22 1017    Narrative:      EXAMINATION: CT MAXILLOFACIAL WO CONT-      6/12/2022 9:47 AM CDT     HISTORY: Syncope, left periorbital bruising and swelling, left zygomatic  arch and jaw tenderness     In order to have a CT radiation dose as low as reasonably achievable  Automated Exposure Control was utilized for adjustment of the mA and/or  KV according to patient size.     DLP in mGycm= 300.     Noncontrast facial bone CT.  Axial, sagittal, and coronal sequences.     Left-sided injuries were incompletely visualized on the head CT and were  thought to represent old injuries.  Findings on the facial bone CT indicate that these are acute fractures.     Intact mandible.  Intact nasal bones.     Mildly depressed lateral wall left maxillary sinus fracture.  Layering fluid/blood within the left maxillary sinus.     Mildly comminuted and displaced lateral wall left orbit fracture.  Minimally displaced left zygomatic arch fractures.     Orbital contents are intact.     Summary:  1. Left maxillary sinus, left zygomatic arch, and left bony orbit  fractures.                                   This report was finalized on 06/12/2022 10:14 by Dr. Stephen Corbett MD.    CT Cervical Spine Without Contrast [[id number]] Collected: 06/12/22 1008     Updated: 06/12/22 1013    Narrative:      EXAMINATION: CT CERVICAL SPINE WO CONTRAST-      6/12/2022 9:47 AM CDT     HISTORY: Syncope. Fall injury. Head/neck trauma.     In order to have a CT radiation dose as low as  reasonably achievable  Automated Exposure Control was utilized for adjustment of the mA and/or  KV according to patient size.     DLP in mGycm= 300. Axial, sagittal, and coronal noncontrast CT imaging.     Vertebral bodies and posterior elements are intact.     Reconstructed sagittal images show normal curvature.   There is no malalignment. Prevertebral soft tissues are normal.   Facet joints align normally.     Reconstructed coronal images show normal lateral mass alignment.     Degenerative disc and endplate changes from C4 through C7       Impression:      1. No acute fracture.                                         This report was finalized on 06/12/2022 10:10 by Dr. Stephen Corbett MD.    CT Head Without Contrast [[id number]] Collected: 06/12/22 1003     Updated: 06/12/22 1009    Narrative:      EXAMINATION: CT HEAD WO CONTRAST-      6/12/2022 9:47 AM CDT     HISTORY: Syncope, left-sided pain/trauma     In order to have a CT radiation dose as low as reasonably achievable  Automated Exposure Control was utilized for adjustment of the mA and/or  KV according to patient size.     DLP in mGycm= 656.     Axial, sagittal, and coronal noncontrast CT imaging of the head.     Opacification of the visualized portion of the upper left maxillary  sinus.  The visualized paranasal sinuses are otherwise clear.     The brain and ventricles have an age appropriate appearance.      There is no hemorrhage or mass-effect.   No acute infarction is seen.     No calvarial abnormality.     There appears to be an old injury involving the lateral wall of the left  orbit.       Impression:      1. No acute intracranial abnormality is seen.                                         This report was finalized on 06/12/2022 10:06 by Dr. Stephen Corbett MD.        I have personally reviewed and interpreted the radiology studies and ECG obtained at time of admission.     Assessment / Plan     Assessment:   Active Hospital Problems    Diagnosis    •  Syncope, unspecified syncope type        · Syncope collapse  · Left maxillary sinus, left zygomatic arch, and left bony orbit fractures.  · Hypertension  · Sleep apnea-we will monitor.  Patient has no one at home to bring her machine.    Plan:   ENT consulted  Continue telemetry  Check orthostatic blood pressure  Pain control  PT evaluation  Supportive care      Code Status/Advanced Care Plan: Full code  The patient's surrogate decision maker is arley (friend)    I discussed my findings and recommendations with the full code  Estimated length of stay is tbd    The patient was seen and examined by me on    Electronically signed by Joe Aguirre MD, 06/12/22, 12:52 CDT.

## 2022-06-12 NOTE — PLAN OF CARE
Goal Outcome Evaluation:  Plan of Care Reviewed With: patient        Progress: no change  Outcome Evaluation: Patient admitted to  from ER, s/p possible syncopal episode/fall at home, see CT results for fractures, IID, RA, safety maintained, ENT consulted, awaiting orders.

## 2022-06-13 ENCOUNTER — APPOINTMENT (OUTPATIENT)
Dept: ULTRASOUND IMAGING | Facility: HOSPITAL | Age: 67
End: 2022-06-13

## 2022-06-13 VITALS
DIASTOLIC BLOOD PRESSURE: 44 MMHG | WEIGHT: 170 LBS | HEART RATE: 101 BPM | BODY MASS INDEX: 26.68 KG/M2 | OXYGEN SATURATION: 99 % | SYSTOLIC BLOOD PRESSURE: 103 MMHG | HEIGHT: 67 IN | TEMPERATURE: 98.1 F | RESPIRATION RATE: 16 BRPM

## 2022-06-13 PROBLEM — S00.10XA ECCHYMOSIS OF EYELID: Status: ACTIVE | Noted: 2022-06-13

## 2022-06-13 PROBLEM — S02.842A: Status: ACTIVE | Noted: 2022-06-13

## 2022-06-13 PROBLEM — S02.40FD CLOSED FRACTURE OF LEFT ZYGOMATIC ARCH WITH ROUTINE HEALING: Status: ACTIVE | Noted: 2022-06-13

## 2022-06-13 PROBLEM — S01.81XA LACERATION OF PERIORBITAL AREA: Status: ACTIVE | Noted: 2022-06-13

## 2022-06-13 PROBLEM — R00.0 TACHYCARDIA: Status: ACTIVE | Noted: 2022-06-13

## 2022-06-13 LAB
QT INTERVAL: 352 MS
QTC INTERVAL: 428 MS
TSH SERPL DL<=0.05 MIU/L-ACNC: 1.23 UIU/ML (ref 0.27–4.2)

## 2022-06-13 PROCEDURE — 93970 EXTREMITY STUDY: CPT | Performed by: SURGERY

## 2022-06-13 PROCEDURE — 25010000002 DEXAMETHASONE PER 1 MG: Performed by: OTOLARYNGOLOGY

## 2022-06-13 PROCEDURE — G0378 HOSPITAL OBSERVATION PER HR: HCPCS

## 2022-06-13 PROCEDURE — 96376 TX/PRO/DX INJ SAME DRUG ADON: CPT

## 2022-06-13 PROCEDURE — 93970 EXTREMITY STUDY: CPT

## 2022-06-13 PROCEDURE — 99204 OFFICE O/P NEW MOD 45 MIN: CPT | Performed by: OTOLARYNGOLOGY

## 2022-06-13 RX ORDER — ECHINACEA PURPUREA EXTRACT 125 MG
2 TABLET ORAL
Qty: 7 ML | Refills: 0 | Status: SHIPPED | OUTPATIENT
Start: 2022-06-13 | End: 2022-06-27

## 2022-06-13 RX ORDER — OXYMETAZOLINE HYDROCHLORIDE 0.05 G/100ML
2 SPRAY NASAL 3 TIMES DAILY
Qty: 2 ML | Refills: 0 | Status: SHIPPED | OUTPATIENT
Start: 2022-06-13 | End: 2022-06-16

## 2022-06-13 RX ADMIN — Medication 2 SPRAY: at 18:14

## 2022-06-13 RX ADMIN — SALINE NASAL SPRAY 2 SPRAY: 1.5 SOLUTION NASAL at 18:14

## 2022-06-13 RX ADMIN — DEXAMETHASONE SODIUM PHOSPHATE 6 MG: 4 INJECTION, SOLUTION INTRA-ARTICULAR; INTRALESIONAL; INTRAMUSCULAR; INTRAVENOUS; SOFT TISSUE at 14:50

## 2022-06-13 RX ADMIN — Medication 1 TABLET: at 09:24

## 2022-06-13 RX ADMIN — DEXAMETHASONE SODIUM PHOSPHATE 6 MG: 4 INJECTION, SOLUTION INTRA-ARTICULAR; INTRALESIONAL; INTRAMUSCULAR; INTRAVENOUS; SOFT TISSUE at 04:47

## 2022-06-13 RX ADMIN — DEXAMETHASONE SODIUM PHOSPHATE 6 MG: 4 INJECTION, SOLUTION INTRA-ARTICULAR; INTRALESIONAL; INTRAMUSCULAR; INTRAVENOUS; SOFT TISSUE at 09:23

## 2022-06-13 RX ADMIN — CETIRIZINE HYDROCHLORIDE 10 MG: 10 TABLET ORAL at 09:24

## 2022-06-13 RX ADMIN — Medication 10 ML: at 09:24

## 2022-06-13 RX ADMIN — SALINE NASAL SPRAY 2 SPRAY: 1.5 SOLUTION NASAL at 11:15

## 2022-06-13 RX ADMIN — SALINE NASAL SPRAY 2 SPRAY: 1.5 SOLUTION NASAL at 14:51

## 2022-06-13 RX ADMIN — VENLAFAXINE HYDROCHLORIDE 150 MG: 75 CAPSULE, EXTENDED RELEASE ORAL at 09:24

## 2022-06-13 RX ADMIN — CHOLECALCIFEROL TAB 10 MCG (400 UNIT) 400 UNITS: 10 TAB at 09:24

## 2022-06-13 RX ADMIN — SALINE NASAL SPRAY 2 SPRAY: 1.5 SOLUTION NASAL at 09:22

## 2022-06-13 RX ADMIN — LISINOPRIL 10 MG: 10 TABLET ORAL at 09:24

## 2022-06-13 RX ADMIN — Medication 2 SPRAY: at 09:23

## 2022-06-13 NOTE — DISCHARGE SUMMARY
[facility] Medicine Services  DISCHARGE SUMMARY       Date of Admission: 6/12/2022  Date of Discharge:  6/13/2022  Primary Care Physician: Vera Ford MD    Presenting Problem/Chief Complaint:  syncope    Final Discharge Diagnoses:  Active Hospital Problems    Diagnosis    • Closed fracture of lateral wall of left orbit (HCC)    • Closed fracture of left zygomatic arch with routine healing    • Ecchymosis of eyelid    • Laceration of periorbital area    • Syncope, unspecified syncope type        Consults: Dr. De Los Santos with ENT.    Procedures Performed: None.    Pertinent Test Results:     Imaging Results (All)     Procedure Component Value Units Date/Time    NM Lung Ventilation Perfusion [[id number]] Collected: 06/12/22 1148     Updated: 06/12/22 1151    Narrative:      EXAMINATION: NM LUNG VENTILATION PERFUSION-     6/12/2022 11:03 AM CDT     HISTORY: syncope, elevated d dimer; R55-Syncope and collapse;  S02.401A-Maxillary fracture, unspecified side, initial encounter for  closed fracture; S02.40FA-Zygomatic fracture, left side, initial  encounter for closed fracture; S02.85XA-Fracture of orbit, unspecified,  initial encounter for closed fracture; R04.0-Epistaxis     Comparison is made with a chest x-ray from earlier today at 10:08 AM.     Nuclear medicine perfusion lung scan.     Dose: 4.0 mCi technetium MAA.  Route of administration: IV injection.     Homogeneous perfusion imaging with no focal peripheral defect to  indicate pulmonary embolism.     Summary:  1. Low probability for pulmonary embolism.     This report was finalized on 06/12/2022 11:48 by Dr. Stephen Corbett MD.    XR Chest 1 View [[id number]] Collected: 06/12/22 1019     Updated: 06/12/22 1023    Narrative:      EXAMINATION: XR CHEST 1 VW-     6/12/2022 10:08 AM     HISTORY: Syncope. Fall injury. Head and neck trauma.     One view chest x-ray.     Heart size is normal.  The mediastinum is within normal  limits.      The lungs are mildly hyperexpanded with no pneumonia or pneumothorax.  Mild chronic appearing interstitial disease with a few calcified  granulomas.  No congestive failure changes.                                                                       Impression:      1. No acute disease.           This report was finalized on 06/12/2022 10:20 by Dr. Stephen Corbett MD.    CT Maxillofacial Without Contrast [[id number]] Collected: 06/12/22 1010     Updated: 06/12/22 1017    Narrative:      EXAMINATION: CT MAXILLOFACIAL WO CONT-      6/12/2022 9:47 AM CDT     HISTORY: Syncope, left periorbital bruising and swelling, left zygomatic  arch and jaw tenderness     In order to have a CT radiation dose as low as reasonably achievable  Automated Exposure Control was utilized for adjustment of the mA and/or  KV according to patient size.     DLP in mGycm= 300.     Noncontrast facial bone CT.  Axial, sagittal, and coronal sequences.     Left-sided injuries were incompletely visualized on the head CT and were  thought to represent old injuries.  Findings on the facial bone CT indicate that these are acute fractures.     Intact mandible.  Intact nasal bones.     Mildly depressed lateral wall left maxillary sinus fracture.  Layering fluid/blood within the left maxillary sinus.     Mildly comminuted and displaced lateral wall left orbit fracture.  Minimally displaced left zygomatic arch fractures.     Orbital contents are intact.     Summary:  1. Left maxillary sinus, left zygomatic arch, and left bony orbit  fractures.                                   This report was finalized on 06/12/2022 10:14 by Dr. Stephen Corbett MD.    CT Cervical Spine Without Contrast [[id number]] Collected: 06/12/22 1008     Updated: 06/12/22 1013    Narrative:      EXAMINATION: CT CERVICAL SPINE WO CONTRAST-      6/12/2022 9:47 AM CDT     HISTORY: Syncope. Fall injury. Head/neck trauma.     In order to have a CT radiation dose as low as  reasonably achievable  Automated Exposure Control was utilized for adjustment of the mA and/or  KV according to patient size.     DLP in mGycm= 300. Axial, sagittal, and coronal noncontrast CT imaging.     Vertebral bodies and posterior elements are intact.     Reconstructed sagittal images show normal curvature.   There is no malalignment. Prevertebral soft tissues are normal.   Facet joints align normally.     Reconstructed coronal images show normal lateral mass alignment.     Degenerative disc and endplate changes from C4 through C7       Impression:      1. No acute fracture.                                         This report was finalized on 06/12/2022 10:10 by Dr. Stephen Corbett MD.    CT Head Without Contrast [[id number]] Collected: 06/12/22 1003     Updated: 06/12/22 1009    Narrative:      EXAMINATION: CT HEAD WO CONTRAST-      6/12/2022 9:47 AM CDT     HISTORY: Syncope, left-sided pain/trauma     In order to have a CT radiation dose as low as reasonably achievable  Automated Exposure Control was utilized for adjustment of the mA and/or  KV according to patient size.     DLP in mGycm= 656.     Axial, sagittal, and coronal noncontrast CT imaging of the head.     Opacification of the visualized portion of the upper left maxillary  sinus.  The visualized paranasal sinuses are otherwise clear.     The brain and ventricles have an age appropriate appearance.      There is no hemorrhage or mass-effect.   No acute infarction is seen.     No calvarial abnormality.     There appears to be an old injury involving the lateral wall of the left  orbit.       Impression:      1. No acute intracranial abnormality is seen.                                         This report was finalized on 06/12/2022 10:06 by Dr. Stephen Corbett MD.          LAB RESULTS:      Lab 06/12/22  0930   WBC 9.64   HEMOGLOBIN 14.0   HEMATOCRIT 41.9   PLATELETS 286   NEUTROS ABS 8.22*   IMMATURE GRANS (ABS) 0.02   LYMPHS ABS 0.98   MONOS ABS 0.38    EOS ABS 0.01   MCV 90.1   PROTIME 12.8   APTT 26.9   D DIMER QUANT 1.03*         Lab 06/12/22  0930   SODIUM 142   POTASSIUM 4.4   CHLORIDE 103   CO2 30.0*   ANION GAP 9.0   BUN 32*   CREATININE 0.95   EGFR 65.0   GLUCOSE 148*   CALCIUM 10.1         Lab 06/12/22  0930   TOTAL PROTEIN 7.4   ALBUMIN 4.80   GLOBULIN 2.6   ALT (SGPT) 21   AST (SGOT) 20   BILIRUBIN 0.6   ALK PHOS 67         Lab 06/12/22  1152 06/12/22  0930   TROPONIN T <0.010 <0.010   PROTIME  --  12.8   INR  --  1.00                 Lab 06/12/22  0947   PH, ARTERIAL 7.419   PCO2, ARTERIAL 41.9   PO2 ART 80.6*   O2 SATURATION ART 95.0   HCO3 ART 27.1*   BASE EXCESS ART 2.3*     Brief Urine Lab Results  (Last result in the past 365 days)      Color   Clarity   Blood   Leuk Est   Nitrite   Protein   CREAT   Urine HCG        06/12/22 1032 Yellow   Clear   Negative   Small (1+)   Negative   Negative               Microbiology Results (last 10 days)     Procedure Component Value - Date/Time    COVID-19,[facility] IN-HOUSE,Nasal Swab No Transport Media 3-4 HR TAT - Swab, Nasal Cavity [[id number]]  (Normal) Collected: 06/12/22 0907    Lab Status: Final result Specimen: Swab from Nasal Cavity Updated: 06/12/22 0944     COVID19 Not Detected    Narrative:      Fact sheet for providers: https://www.fda.gov/media/811809/download     Fact sheet for patients: https://www.fda.gov/media/485033/download    Test performed by PCR.    Consider negative results in combination with clinical observations, patient history, and epidemiological information.          Chief Complaint on Day of Discharge: Overall, patient reports feeling well.  Denies shortness of breath, chest pain or pressure.  Denies nausea, vomiting or abdominal pain.  States she has been up ambulating without difficulty    Hospital Course:  The patient is a 67 y.o. female who presented to [facility] with syncope with resultant left-sided facial trauma.  She has a past medical history significant for  hypertension, hyperlipidemia, depression and anxiety.  She follows with Dr. Ford for her primary care.  She lives at home alone.  Patient was in her usual state of health on 6/11 when she fell asleep in her recliner.  States that she believes she woke up around 1230 AM to her dog barking.  She went to the bathroom and noticed she had blood running down from the left side of her face.  States that she does not remember falling or passing out.  Denied double vision.  Denies numbness.  States that she went to sleep and when she woke up in the morning she still felt unwell which prompted her to call her friend to bring her to the ED for further evaluation.  In the ED, she was found to have left midface facial fracture.  She also sustained a laceration left periorbital that was sutured in ED.  Urinalysis not consistent with acute urinary tract infection.  Ethanol level and UDS negative.  Chest x-ray stable.  CT head negative for acute intracranial normality.  Troponin negative x2.  She was admitted to the hospitalist service for further evaluation and management.    She was seen in consultation by Dr. De Los Santos with ENT.  No plans for surgery, observation best at this time with outpatient follow-up.  She is okay for discharge from ENT standpoint.  Recommendations for home care include Afrin x3 days, saline spray, do not blow nose for at least 1 week.  Ice to face for 24 more hours and alternate heat and ice.  Follow-up with ENT in 6 to 8 weeks.    She has been normal sinus rhythm  bpm on telemetry.  Patient unsure if her heart rate normally runs on the higher side.  Denies palpitations.  Denies irregular heartbeat.  Recommend 14-day Zio patch at time of discharge.    D-dimer 1.03.  Nuclear med lung ventilation/perfusion scan low probability for pulmonary embolism.  Venous duplex bilateral lower extremities to be obtained prior to discharge -preliminary with no thrombus visualized.    Overall, patient reports  "feeling well.  Denies shortness of breath, chest pain or pressure.  Denies nausea, vomiting or abdominal pain.  Orthostatic vitals negative.  States she has been up ambulating without difficulty.  She feels ready for discharge home.  She is to follow-up with Dr. Ford within 1 week.    Condition on Discharge:  Medically stable.    Physical Exam on Discharge:  /44 (BP Location: Left arm, Patient Position: Lying)   Pulse 101   Temp 98.1 °F (36.7 °C) (Oral)   Resp 16   Ht 170.2 cm (67\")   Wt 77.1 kg (170 lb)   SpO2 99%   BMI 26.63 kg/m²   Physical Exam  Constitutional:       Appearance: She is well-developed.      Comments: Up in bed.  No acute distress.  Tolerating room air.   HENT:      Head: Normocephalic and atraumatic.      Comments: Left periorbital bruising with left periorbital laceration, 1 cm.  Eyes:      General: No scleral icterus.     Conjunctiva/sclera: Conjunctivae normal.      Pupils: Pupils are equal, round, and reactive to light.   Neck:      Vascular: No JVD.   Cardiovascular:      Rate and Rhythm: Regular rhythm. Tachycardia present.      Heart sounds: Normal heart sounds.      Comments: Sinus-sinus tachycardia  on telemetry  Pulmonary:      Effort: Pulmonary effort is normal.      Breath sounds: Normal breath sounds. No wheezing or rales.   Abdominal:      General: Bowel sounds are normal. There is no distension.      Palpations: Abdomen is soft.      Tenderness: There is no abdominal tenderness.   Musculoskeletal:         General: Normal range of motion.      Cervical back: Neck supple.   Lymphadenopathy:      Cervical: No cervical adenopathy.   Skin:     General: Skin is warm and dry.   Neurological:      Mental Status: She is alert and oriented to person, place, and time.      Cranial Nerves: No cranial nerve deficit.   Psychiatric:         Behavior: Behavior normal.       Discharge Disposition:  Home or Self Care    Discharge Medications:     Discharge Medications      New " Medications      Instructions Start Date   oxymetazoline 0.05 % nasal spray  Commonly known as: AFRIN   2 sprays, Nasal, 3 Times Daily      sodium chloride 0.65 % nasal spray   2 sprays, Nasal, 5 Times Daily         Continue These Medications      Instructions Start Date   calcium carbonate 600 MG tablet  Commonly known as: OS-ELDER   600 mg, Oral, Daily      cholecalciferol 10 MCG (400 UNIT) tablet  Commonly known as: VITAMIN D3   400 Units, Oral, Daily      fexofenadine 180 MG tablet  Commonly known as: ALLEGRA   180 mg, Oral, Daily      lisinopril 10 MG tablet  Commonly known as: PRINIVIL,ZESTRIL   10 mg, Oral, Daily      montelukast 10 MG tablet  Commonly known as: SINGULAIR   10 mg, Oral, Nightly      multivitamin with minerals tablet tablet   1 tablet, Oral, Daily      OCUVITE EYE HEALTH FORMULA PO   1 tablet, Oral, Daily      venlafaxine  MG 24 hr capsule  Commonly known as: EFFEXOR-XR   150 mg, Oral, Daily             Discharge Diet:   Diet Instructions     Diet: Regular, Cardiac      Discharge Diet:  Regular  Cardiac             Activity at Discharge:   Activity Instructions     Activity as Tolerated            Discharge Care Plan/Instructions:   1.  ENT Recommendations for home care include Afrin x3 days, saline spray, do not blow nose for at least 1 week.  Ice to face for 24 more hours and alternate heat and ice.  2. 14-day Zio patch.  3.  Seek evaluation for worsening symptoms.    Follow-up Appointments:   1.  Follow-up with Dr. Ford in 1 week.  2.  Follow-up with ENT in 6-8 weeks.    Test Results Pending at Discharge: None.    Electronically signed by CECE Blair, 06/13/22, 14:46 CDT.    Time: 35 minutes.

## 2022-06-13 NOTE — PLAN OF CARE
Goal Outcome Evaluation:  Plan of Care Reviewed With: patient        Progress: no change  Outcome Evaluation: VSS. Pt appeared to sleep through most of night. Up with stand by assist, gait steady. Safety maintained. No c/o pain.

## 2022-06-13 NOTE — PLAN OF CARE
Goal Outcome Evaluation:  Plan of Care Reviewed With: patient        Progress: declining  Outcome Evaluation: iid patent. cont decadron iv and nasal sprays. denies pain. left eye with healing stage of bruise- yellow and purple. up with stand by assist. awaiting bilat lower ext venous scan.  cont to  monitor.

## 2022-06-13 NOTE — CONSULTS
[facility] Otolaryngology Head and Neck Surgery  CONSULT  Patient Name: Patrizia Jimenes  : 1955  MRN: [id number]  Primary Care Physician:  Vera Ford MD  Referring Physician: No ref. provider found  Date of admission: 2022  Length of Stay: 0  Room: [facility]      Subjective    Subjective   History of Present Illness  Chief Complaint/Reason for Consultation: Facial fracture  Accompanied by: No one  Patrizia Jimenes is a 67 y.o. female who apparently sustained facial trauma in the early hours of  morning.  The patient gives a history of perhaps walking into a door approximately 12:30 AM.  She then awoke later that morning with findings of blood around her left eye.  She became increasingly dizzy and weak.  She had some nausea.  She presented to the emergency room for evaluation and treatment.  Patient denies any double vision.  She denies numbness in her cheek or her upper teeth.  She says her vision is currently intact.  She says initially that she did have some teeth mismatching.  This morning, the teeth feel much more in alignment.  She notes that she always has crooked teeth.  CT scan was obtained.  The patient was found to have a left midface facial fracture.  Patient was admitted by the hospitalist for evaluation and treatment of her other symptoms.  Patient is reliable historian.  Patient is seen, chart is reviewed    Review of Systems   Respiratory: Positive for apnea. Negative for cough, choking and shortness of breath.       Otherwise complete ROS is negative except as mentioned above.    Past Medical History:   Past Medical History:   Diagnosis Date   • Anxiety    • Hypertension    • MARYBETH on CPAP      Past Surgical History:History reviewed. No pertinent surgical history.    Family History: Her family history is not on file.   Social History: She  reports that she has never smoked. She has never used smokeless tobacco. She reports previous alcohol use. No  history on file for drug use.    Home Medications:  Multiple Vitamins-Minerals, calcium carbonate, cholecalciferol, fexofenadine, lisinopril, montelukast, multivitamin with minerals, and venlafaxine XR    Allergies:  She has No Known Allergies.    Objective    Objective   Vitals:    Temp:  [97.6 °F (36.4 °C)-98.2 °F (36.8 °C)] 98 °F (36.7 °C)  Heart Rate:  [] 109  Resp:  [16-18] 16  BP: (103-134)/(49-76) 103/57    Physical Exam  Vitals reviewed.   Constitutional:       Appearance: Normal appearance. She is well-developed and normal weight.      Comments: Sitting up in bed watching TV  .  Alert  Left inferior periorbital ecchymosis   HENT:      Head: Normocephalic and atraumatic.      Right Ear: Hearing and external ear normal.      Left Ear: Hearing and external ear normal.      Nose: Nose normal. No nasal deformity, mucosal edema or rhinorrhea.      Mouth/Throat:      Lips: Pink.      Mouth: Mucous membranes are moist.      Dentition: Normal dentition. No gum lesions.      Tongue: No lesions. Tongue does not deviate from midline.      Pharynx: Oropharynx is clear. Uvula midline.   Eyes:      General: Gaze aligned appropriately.      Extraocular Movements: Extraocular movements intact.      Conjunctiva/sclera: Conjunctivae normal.        Comments: Left eye-ecchymosis present inferior lid and the lateral canthal area  Lateral canthal laceration-1 cm appears closed   Neck:      Thyroid: No thyroid mass or thyromegaly.   Pulmonary:      Effort: Pulmonary effort is normal. No tachypnea, respiratory distress or retractions.      Breath sounds: No stridor.   Musculoskeletal:         General: Normal range of motion.      Cervical back: Normal range of motion and neck supple. No crepitus. No pain with movement.   Lymphadenopathy:      Cervical: No cervical adenopathy.   Skin:     Findings: No rash.   Neurological:      General: No focal deficit present.      Mental Status: She is alert and oriented to person, place,  and time.      Cranial Nerves: Cranial nerves are intact. No cranial nerve deficit.   Psychiatric:         Attention and Perception: Attention and perception normal.         Speech: Speech normal.         Behavior: Behavior normal. Behavior is cooperative.         Thought Content: Thought content normal.         Cognition and Memory: Cognition normal.         Judgment: Judgment normal.         Result Review    Result Review:  I have personally reviewed the results from the time of this admission to 6/13/2022 07:49 CDT and agree with these findings:  [x]  Laboratory  []  Microbiology  [x]  Radiology  []  EKG/Telemetry   []  Cardiology/Vascular   []  Pathology  [x]  Old records  []  Other:  Most notable findings include: Elevated glucose  CT scan of the face reviewed, agree with radiology interpretation   H&P by Joe Aguirre MD (06/12/2022 12:52)   ED Provider Notes by Diaz Angel PA-C (06/12/2022 08:55)   CBC & Differential (06/12/2022 09:30)   Comprehensive Metabolic Panel (06/12/2022 09:30)   CT Maxillofacial Without Contrast (06/12/2022 09:55)       Assessment & Plan    Assessment / Plan   Brief Patient Summary:  Patrizia Jimenes is a 67 y.o. female who sustained facial fracture to the left midface.  Etiology is somewhat questionable.  On examination, the patient appears to have good projection of the face with a palpable zygomatic frontal suture fracture.  The projection of the inferior orbital rim and the zygoma appear intact.  I have discussed options with the patient, both surgical and nonsurgical.  I feel the patient is best treated without surgery at this point in time.  I will continue with nasal hygiene.  She is in satisfactory condition for discharge home.  Recommendations for home care  Afrin x3 days  Saline spray  Do not blow nose for at least 1 week  Ice to face for 24 more hours, then alternate heat and ice  Follow-up with ENT in 6 to 8 weeks    Active Hospital Problems:  Active  Hospital Problems    Diagnosis    • Closed fracture of lateral wall of left orbit (HCC)    • Closed fracture of left zygomatic arch with routine healing    • Ecchymosis of eyelid    • Laceration of periorbital area    • Syncope, unspecified syncope type            Plan:   • Facial fracture-patient has a minimally displaced left zygomatic arch fracture that appears to be in good alignment.  She does have a zygomaticofrontal suture fracture with a lateral orbital fracture in conjunction with this.  She does have a lateral maxillary sinus fracture.  The infraorbital rim is intact.  The lateral orbital wall is slightly impacted.  She does have good projection of the face currently.  After discussion with the patient, I feel that observation is best at this time.  • Laceration left periorbital-patient has a 1 cm superficial laceration of the lateral periorbita overlying the orbital rim.  This appears to be closed.  I will follow  • Possible syncope-Per primary team    I discussed the patient's findings and my recommendations with patient.  I will follow peripherally. Please call if further consultation needed.        DVT prophylaxis:  Mechanical DVT prophylaxis orders are present.    Discharge Planning: Per primary team.  The patient can follow-up with ENT in 6 to 8 weeks.  I have discussed discharge instructions for an ENT standpoint.  Recommendations are as listed above.  Written instructions left for patient.    Electronically signed by Biju De Los Santos Jr, MD, 06/13/22, 7:36 AM CDT.

## 2022-06-14 ENCOUNTER — TELEPHONE (OUTPATIENT)
Dept: PRIMARY CARE CLINIC | Age: 67
End: 2022-06-14

## 2022-06-14 NOTE — TELEPHONE ENCOUNTER
[facility] Trans[facility] Initial Follow Up Call    Outreach made within 2 business days of discharge: Yes    Patient: Analia Alexandra Patient : 1955   MRN: [id number]  Reason for Admission: Syncope Discharge Date: 2022       Spoke with: Patient  Discharge department/facility: [facility] Interactive Patient Contact:  Was patient able to fill all prescriptions: Yes  Was patient instructed to bring all medications to the follow-up visit: Yes  Is patient taking all medications as directed in the discharge summary?  Yes  Does patient understand their discharge instructions: Yes  Does patient have questions or concerns that need addressed prior to 7-14 day follow up office visit: no    Scheduled appointment with PCP within 7-14 days    Follow Up  Future Appointments   Date Time Provider Arturo Centeno   [facility]   11/10/2022 11:45 AM MD MORA Stephens [facility]   2022 10:00 AM RENAY Gonzalez NE[facility]       Pradip Zuniga Complex Repair And O-T Advancement Flap Text: The defect edges were debeveled with a #15 scalpel blade.  The primary defect was closed partially with a complex linear closure.  Given the location of the remaining defect, shape of the defect and the proximity to free margins an O-T advancement flap was deemed most appropriate for complete closure of the defect.  Using a sterile surgical marker, an appropriate advancement flap was drawn incorporating the defect and placing the expected incisions within the relaxed skin tension lines where possible.    The area thus outlined was incised deep to adipose tissue with a #15 scalpel blade.  The skin margins were undermined to an appropriate distance in all directions utilizing iris scissors.

## 2022-06-20 ENCOUNTER — OFFICE VISIT (OUTPATIENT)
Dept: FAMILY MEDICINE CLINIC | Age: 67
End: 2022-06-20
Payer: MEDICARE

## 2022-06-20 VITALS
SYSTOLIC BLOOD PRESSURE: 109 MMHG | OXYGEN SATURATION: 95 % | HEIGHT: 67 IN | DIASTOLIC BLOOD PRESSURE: 62 MMHG | HEART RATE: 86 BPM | BODY MASS INDEX: 27 KG/M2 | TEMPERATURE: 98 F | WEIGHT: 172 LBS

## 2022-06-20 DIAGNOSIS — R55 SYNCOPE, UNSPECIFIED SYNCOPE TYPE: ICD-10-CM

## 2022-06-20 DIAGNOSIS — Z09 HOSPITAL DISCHARGE FOLLOW-UP: Primary | ICD-10-CM

## 2022-06-20 PROCEDURE — 1036F TOBACCO NON-USER: CPT | Performed by: NURSE PRACTITIONER

## 2022-06-20 PROCEDURE — G8427 DOCREV CUR MEDS BY ELIG CLIN: HCPCS | Performed by: NURSE PRACTITIONER

## 2022-06-20 PROCEDURE — 3017F COLORECTAL CA SCREEN DOC REV: CPT | Performed by: NURSE PRACTITIONER

## 2022-06-20 PROCEDURE — 1123F ACP DISCUSS/DSCN MKR DOCD: CPT | Performed by: NURSE PRACTITIONER

## 2022-06-20 PROCEDURE — G8417 CALC BMI ABV UP PARAM F/U: HCPCS | Performed by: NURSE PRACTITIONER

## 2022-06-20 PROCEDURE — G8399 PT W/DXA RESULTS DOCUMENT: HCPCS | Performed by: NURSE PRACTITIONER

## 2022-06-20 PROCEDURE — 1090F PRES/ABSN URINE INCON ASSESS: CPT | Performed by: NURSE PRACTITIONER

## 2022-06-20 PROCEDURE — 99213 OFFICE O/P EST LOW 20 MIN: CPT | Performed by: NURSE PRACTITIONER

## 2022-06-20 RX ORDER — ECHINACEA PURPUREA EXTRACT 125 MG
2 TABLET ORAL
COMMUNITY

## 2022-06-20 RX ORDER — OXYMETAZOLINE HYDROCHLORIDE 0.05 G/100ML
2 SPRAY NASAL 2 TIMES DAILY
COMMUNITY

## 2022-06-20 RX ORDER — BLOOD PRESSURE TEST KIT
1 KIT MISCELLANEOUS DAILY
Qty: 1 KIT | Refills: 0 | Status: SHIPPED | OUTPATIENT
Start: 2022-06-20

## 2022-06-20 RX ORDER — NAPROXEN SODIUM 220 MG
220 TABLET ORAL 2 TIMES DAILY WITH MEALS
COMMUNITY

## 2022-06-20 ASSESSMENT — ENCOUNTER SYMPTOMS
RESPIRATORY NEGATIVE: 1
ALLERGIC/IMMUNOLOGIC NEGATIVE: 1
GASTROINTESTINAL NEGATIVE: 1
EYES NEGATIVE: 1

## 2022-06-20 NOTE — PATIENT INSTRUCTIONS
Normal blood pressure is 110-140 top and 60-90 on bottom  Patient Education        Fainting: Care Instructions  Your Care Instructions     When you faint, or pass out, you lose consciousness for a short time. A brief drop in blood flow to the brain often causes it. When you fall or lie down,more blood flows to your brain and you regain consciousness. Emotional stress, pain, or overheatingespecially if you have been standingcan make you faint. In these cases, fainting is usually not serious. But fainting can be a sign of a more serious problem. Your doctor may want you to have moretests to rule out other causes. The treatment you need depends on the reason why you fainted. The doctor has checked you carefully, but problems can develop later. If you notice any problems or new symptoms, get medical treatment right away. Follow-up care is a key part of your treatment and safety. Be sure to make and go to all appointments, and call your doctor if you are having problems. It's also a good idea to know your test results and keep alist of the medicines you take. How can you care for yourself at home?  Drink plenty of fluids to prevent dehydration. If you have kidney, heart, or liver disease and have to limit fluids, talk with your doctor before you increase your fluid intake. When should you call for help? Call 911 anytime you think you may need emergency care. For example, call if:     You have symptoms of a heart problem. These may include:  ? Chest pain or pressure. ? Severe trouble breathing. ? A fast or irregular heartbeat. ? Lightheadedness or sudden weakness. ? Coughing up pink, foamy mucus. ? Passing out. After you call 911, the  may tell you to chew 1 adult-strength or 2 to 4 low-dose aspirin. Wait for an ambulance. Do not try to drive yourself.      You have symptoms of a stroke.  These may include:  ? Sudden numbness, tingling, weakness, or loss of movement in your face, arm, or leg, especially on only one side of your body. ? Sudden vision changes. ? Sudden trouble speaking. ? Sudden confusion or trouble understanding simple statements. ? Sudden problems with walking or balance. ? A sudden, severe headache that is different from past headaches.      You passed out (lost consciousness) again. Watch closely for changes in your health, and be sure to contact your doctor if:     You do not get better as expected. Where can you learn more? Go to [email or web address] and sign in to your [employer] account. Enter C536 in the [employer] box to learn more about \"Fainting: Care Instructions. \"     If you do not have an account, please click on the \"Sign Up Now\" link. Current as of: July 1, 2021               Content Version: 13.2  © 6162-8024 Healthwise, Incorporated. Care instructions adapted under license by [facility] ([facility]). If you have questions about a medical condition or this instruction, always ask your healthcare professional. [place] [address] any warranty or liability for your use of this information.

## 2022-06-20 NOTE — PROGRESS NOTES
[facility] PHYSICIAN SERVICES  [facility] FAMILY MEDICINE  [address]  [facility] [place] [address]  Dept: [phone]  Dept Fax: [phone]  Loc: [phone]    James Schaeffer is a 69 y.o. female who presents today for her medical conditions/complaints as noted below. James Schaeffer is c/o of Loss of Consciousness (resulted in a fall and fractured orbital bone )        HPI:   She presents for follow up after a syncope episode from 6/12/22 that occurred at home. She states she is unaware if she actually fainted. She recalls maybe hitting a wall. States she went down and took two sips of water and vomited that up and decided to go to ER. She sustained a 1cm left periorbital lateral laceration. She denies any shortness of breath, chest pain or heart palpitations. She reports wearing heart monitor and had ultrasound of her legs. When reviewing chart notes: CT Maxillofacial bones reported Left maxillary sinus fracture, left zygomatic arch fracture (minimally displaced), and left bony orbital fracture. States she saw ENT  CT cervical spine reported no fractures  CT Head reported no acute intracranial abnormalities. She had her mask off in the room. She states she is vaccinated and booster.   HPI   Chief Complaint   Patient presents with    Loss of Consciousness     resulted in a fall and fractured orbital bone      Past Medical History:   Diagnosis Date    Anxiety     Asymptomatic postmenopausal status (age-related) (natural)     CPAP (continuous positive airway pressure) dependence     11cm    Depression     Headaches due to old head injury     Hyperlipidemia     Hypertension     Joint pain     knee and shoulder    Macular degeneration disease     beginning stage    Neoplasm of uncertain behavior of skin     Obstructive sleep apnea     AHI:  44.5 per PSG, 2/2009    Ringing in ears     Seasonal allergies     Unspecified sleep apnea     uses c-pap    Vitamin D deficiency Past Surgical History:   Procedure Laterality Date    COLONOSCOPY  10 years    [place]-normal 10 yr recall    DILATION AND CURETTAGE OF UTERUS  2007    NM COLONOSCOPY FLX DX W/COLLJ SPEC WHEN PFRMD N/A 8/16/2018    Dr Mallory Level FLX DX W/COLLJ SPEC WHEN PFRMD N/A 8/17/2018    Dr Albina Deluna, 5 yr recall       Vitals 6/20/2022 5/10/2022 11/16/2021 11/16/2021 5/6/2021 04/1/7340   SYSTOLIC 317 448 418 [phone]   DIASTOLIC 62 64 74 74 62 61   Site - - Left Upper Arm - - Right Upper Arm   Position - - Sitting - - Sitting   Cuff Size - - Medium Adult - - Medium Adult   Pulse 86 92 104 104 80 86   Temp 98 97.8 - 97.2 97.5 97.5   SpO2 95 100 98 98 98 98   Weight 172 lb 172 lb 12.8 oz 175 lb 175 lb 174 lb 170 lb   Height 5' 7\" 5' 7\" 5' 7\" 5' 7\" 5' 7\" 5' 7\"   Body mass index 26.94 kg/m2 27.06 kg/m2 27.41 kg/m2 27.41 kg/m2 27.25 kg/m2 26.62 kg/m2   Some recent data might be hidden       Family History   Problem Relation Age of Onset    Stroke Father     Hypertension Father     Other Father         tumor removal, lower abd area    Stroke Mother     Hypertension Mother    Jagdish Isfernando Mother     Other Maternal Grandmother         hodgkins    Other Maternal Grandfather         leukemia    Cancer Paternal Grandmother 80        pancreatic cancer    Heart Disease Paternal Cousin         paternal side of family    Cancer Paternal Cousin         colon    Breast Cancer Paternal Aunt        Social History     Tobacco Use    Smoking status: Never Smoker    Smokeless tobacco: Never Used   Substance Use Topics    Alcohol use: No      Current Outpatient Medications on File Prior to Visit   Medication Sig Dispense Refill    naproxen sodium (ANAPROX) 220 MG tablet Take 220 mg by mouth 2 times daily (with meals)      oxymetazoline (AFRIN) 0.05 % nasal spray 2 sprays by Nasal route 2 times daily      sodium chloride (OCEAN) 0.65 % nasal spray 2 sprays by Nasal route 5 times daily      venlafaxine (EFFEXOR XR) 150 MG extended release capsule TAKE 1 CAPSULE BY MOUTH DAILY 90 capsule 3    zoster recombinant adjuvanted vaccine (SHINGRIX) 50 MCG/0.5ML SUSR injection Inject 0.5 mLs into the muscle See Admin Instructions 1 dose now and repeat in 2-6 months 0.5 mL 0    lisinopril (PRINIVIL;ZESTRIL) 10 MG tablet TAKE 1 TABLET BY MOUTH EVERY DAY 90 tablet 2    montelukast (SINGULAIR) 10 MG tablet TAKE 1 TABLET BY MOUTH EVERY NIGHT 90 tablet 1    diclofenac sodium (VOLTAREN) 1 % GEL Apply 4 g topically 4 times daily 480 g 2    Multiple Vitamins-Minerals (OCUVITE ADULT 50+ PO) Take by mouth daily      fexofenadine (ALLEGRA ALLERGY) 180 MG tablet Take 180 mg by mouth daily      Calcium-Vitamin D (CALTRATE 600 PLUS-VIT D PO) Take  by mouth 2 times daily.  clotrimazole-betamethasone (LOTRISONE) 1-0.05 % cream APPLY  CREAM TOPICALLY TWICE DAILY (Patient not taking: Reported on 6/20/2022) 15 g 0    therapeutic multivitamin-minerals (THERAGRAN-M) tablet Take 1 tablet by mouth daily. (Patient not taking: Reported on 6/20/2022)       No current facility-administered medications on file prior to visit. Allergies   Allergen Reactions    Latex        Health Maintenance   Topic Date Due    Shingles vaccine (2 of 3) 06/10/2013    Depression Monitoring  05/10/2023    Annual Wellness Visit (AWV)  05/11/2023    DTaP/Tdap/Td vaccine (2 - Td or Tdap) 07/15/2023    Breast cancer screen  07/22/2023    Colorectal Cancer Screen  08/17/2023    Lipids  09/28/2026    DEXA (modify frequency per FRAX score)  Completed    Flu vaccine  Completed    Pneumococcal 65+ years Vaccine  Completed    COVID-19 Vaccine  Completed    Hepatitis C screen  Completed    Hepatitis A vaccine  Aged Out    Hepatitis B vaccine  Aged Out    Hib vaccine  Aged Out    Meningococcal (ACWY) vaccine  Aged Out       Subjective:      Review of Systems   Constitutional: Negative. HENT: Negative. Eyes: Negative.     Respiratory: Negative. Cardiovascular: Negative. Gastrointestinal: Negative. Endocrine: Negative. Genitourinary: Negative. Musculoskeletal: Negative. Skin:        Left side face bruising and tenderness   Allergic/Immunologic: Negative. Neurological: Negative. Hematological: Negative. Psychiatric/Behavioral: Negative. Objective:     Physical Exam  Vitals and nursing note reviewed. Constitutional:       Appearance: Normal appearance. HENT:      Head: Normocephalic. Comments: Left sided facial ecchymosis and tenderness     Right Ear: Tympanic membrane normal.      Left Ear: Tympanic membrane normal.      Nose: Nose normal.      Mouth/Throat:      Mouth: Mucous membranes are moist.      Pharynx: Oropharynx is clear. No oropharyngeal exudate or posterior oropharyngeal erythema. Eyes:      General:         Right eye: No discharge. Left eye: No discharge. Pupils: Pupils are equal, round, and reactive to light. Cardiovascular:      Rate and Rhythm: Normal rate and regular rhythm. Pulmonary:      Effort: Pulmonary effort is normal.   Musculoskeletal:      Cervical back: Normal range of motion. Skin:     General: Skin is warm and dry. Capillary Refill: Capillary refill takes less than 2 seconds. Neurological:      General: No focal deficit present. Mental Status: She is alert. Psychiatric:         Mood and Affect: Mood normal.         Behavior: Behavior normal.         Thought Content: Thought content normal.         Judgment: Judgment normal.       /62   Pulse 86   Temp 98 °F (36.7 °C)   Ht 5' 7\" (1.702 m)   Wt 172 lb (78 kg)   SpO2 95%   BMI 26.94 kg/m²     Assessment:       Diagnosis Orders   1. Hospital discharge follow-up     2. Syncope, unspecified syncope type  Blood Pressure KIT     No further syncopal episodes since 6/12/22    Plan:   Blood pressure kit-start monitoring blood pressure and keep BP log.   She was instructed on normal BP range. Advised to keep follow up    PDMP Monitoring:    Last PDMP Job Foots as Reviewed [facility]):  Review User Review Instant Review Result            Urine Drug Screenings (1 yr)    No resulted procedures found. Medication Contract and Consent for Opioid Use Documents Filed      No documents found                 Patient given educational materials -see patient instructions. Discussed use, benefit, and side effects of prescribed medications. All patient questions answered. Pt voiced understanding. Reviewed health maintenance. Instructed to continue currentmedications, diet and exercise. Patient agreed with treatment plan. Follow up as directed. MEDICATIONS:  Orders Placed This Encounter   Medications    Blood Pressure KIT     Si Units by Does not apply route daily     Dispense:  1 kit     Refill:  0         ORDERS:  No orders of the defined types were placed in this encounter. Follow-up:  No follow-ups on file. PATIENT INSTRUCTIONS:  There are no Patient Instructions on file for this visit. Electronically signed by HE Urbano on 2022 at 11:42 AM    EMR Dragon/transcription disclaimer:  Much of thisencounter note is electronic transcription/translation of spoken language to printed texts. The electronic translation of spoken language may be erroneous, or at times, nonsensical words or phrases may be inadvertentlytranscribed.   Although I have reviewed the note for such errors, some may still exist.

## 2022-07-20 DIAGNOSIS — E78.2 MIXED HYPERLIPIDEMIA: ICD-10-CM

## 2022-07-20 DIAGNOSIS — I10 ESSENTIAL (PRIMARY) HYPERTENSION: ICD-10-CM

## 2022-07-20 DIAGNOSIS — E55.9 VITAMIN D DEFICIENCY: ICD-10-CM

## 2022-07-20 LAB
ALBUMIN SERPL-MCNC: 4.5 G/DL (ref 3.5–5.2)
ALP BLD-CCNC: 73 U/L (ref 35–104)
ALT SERPL-CCNC: 16 U/L (ref 5–33)
ANION GAP SERPL CALCULATED.3IONS-SCNC: 7 MMOL/L (ref 7–19)
AST SERPL-CCNC: 18 U/L (ref 5–32)
BILIRUB SERPL-MCNC: 0.5 MG/DL (ref 0.2–1.2)
BUN BLDV-MCNC: 34 MG/DL (ref 8–23)
CALCIUM SERPL-MCNC: 9.8 MG/DL (ref 8.8–10.2)
CHLORIDE BLD-SCNC: 103 MMOL/L (ref 98–111)
CHOLESTEROL, TOTAL: 197 MG/DL (ref 160–199)
CO2: 32 MMOL/L (ref 22–29)
CREAT SERPL-MCNC: 1 MG/DL (ref 0.5–0.9)
GFR AFRICAN AMERICAN: >59
GFR NON-AFRICAN AMERICAN: 55
GLUCOSE BLD-MCNC: 102 MG/DL (ref 74–109)
HDLC SERPL-MCNC: 62 MG/DL (ref 65–121)
LDL CHOLESTEROL CALCULATED: 113 MG/DL
POTASSIUM SERPL-SCNC: 5.1 MMOL/L (ref 3.5–5)
SODIUM BLD-SCNC: 142 MMOL/L (ref 136–145)
TOTAL PROTEIN: 7.1 G/DL (ref 6.6–8.7)
TRIGL SERPL-MCNC: 108 MG/DL (ref 0–149)
VITAMIN D 25-HYDROXY: 59.4 NG/ML

## 2022-07-29 ENCOUNTER — HOSPITAL ENCOUNTER (OUTPATIENT)
Dept: WOMENS IMAGING | Age: 67
Discharge: HOME | End: 2022-07-29
Payer: MEDICARE

## 2022-07-29 DIAGNOSIS — Z12.31 ENCOUNTER FOR SCREENING MAMMOGRAM FOR BREAST CANCER: ICD-10-CM

## 2022-07-29 PROCEDURE — 77063 BREAST TOMOSYNTHESIS BI: CPT

## 2022-07-29 PROCEDURE — 77067 SCR MAMMO BI INCL CAD: CPT | Performed by: RADIOLOGY

## 2022-08-01 ENCOUNTER — OFFICE VISIT (OUTPATIENT)
Dept: OTOLARYNGOLOGY | Facility: CLINIC | Age: 67
End: 2022-08-01

## 2022-08-01 VITALS
BODY MASS INDEX: 28.56 KG/M2 | HEIGHT: 67 IN | TEMPERATURE: 98.4 F | DIASTOLIC BLOOD PRESSURE: 62 MMHG | HEART RATE: 88 BPM | SYSTOLIC BLOOD PRESSURE: 111 MMHG | WEIGHT: 182 LBS

## 2022-08-01 DIAGNOSIS — G47.33 OSA ON CPAP: ICD-10-CM

## 2022-08-01 DIAGNOSIS — Z99.89 OSA ON CPAP: ICD-10-CM

## 2022-08-01 DIAGNOSIS — S02.842D: Primary | ICD-10-CM

## 2022-08-01 PROCEDURE — 99213 OFFICE O/P EST LOW 20 MIN: CPT | Performed by: OTOLARYNGOLOGY

## 2022-08-01 RX ORDER — LISINOPRIL 10 MG/1
1 TABLET ORAL DAILY
COMMUNITY
Start: 2022-04-04

## 2022-08-01 RX ORDER — ZOSTER VACCINE RECOMBINANT, ADJUVANTED 50 MCG/0.5
50 KIT INTRAMUSCULAR
COMMUNITY
Start: 2022-05-10 | End: 2022-11-07

## 2022-08-01 RX ORDER — ECHINACEA PURPUREA EXTRACT 125 MG
2 TABLET ORAL
COMMUNITY

## 2022-08-01 RX ORDER — FEXOFENADINE HCL 180 MG/1
180 TABLET ORAL
COMMUNITY

## 2022-08-01 RX ORDER — MONTELUKAST SODIUM 10 MG/1
1 TABLET ORAL NIGHTLY
COMMUNITY
Start: 2022-04-04

## 2022-08-01 RX ORDER — OXYMETAZOLINE HYDROCHLORIDE 0.05 G/100ML
2 SPRAY NASAL
COMMUNITY

## 2022-08-01 RX ORDER — NAPROXEN SODIUM 220 MG
220 TABLET ORAL
COMMUNITY

## 2022-08-01 NOTE — PATIENT INSTRUCTIONS
Aleve or Advil for pain     APPLICATION OF HEAT AND ICE    At times, judicious application of heat or ice can accelerate healing, diminish swelling and reduce pain. Dr. De Los Santos will frequently prescribe heat, ice or both as part of the treatment of your injury or surgery.     How to apply ice:  Never apply ice directly to the skin. Always place an insulating layer, such as a washcloth or ice bag, between the ice and the skin.  Ice bags can be made of zip lock bags, water and ice, wrapped in a washcloth. Do not fill the bag too full and this will conform well around curves of the body, head and neck.  If your injury has just occurred, remember rest, ice, compression, and elevation (RICE). In an acute injury, ice is best applied for 48 to 72 hours to minimize swelling and pain. Doing this as often as possible (at least 4 times daily, but constantly if possible).     How to apply heat:  Never apply a heating pad while sleeping. This may lead to a burn. Heating pads apply continuous heat that can build up while sleeping.  Hot water bottles are excellent for applying heat.  Make a hot compress by heating a washcloth in the microwave, placing it in a zip lock bag and apply to the affected area.  You can use either dry heat or moist heat, whichever feels the best. Using moist heat will loosen scabbing and crusting, making the scabs easier to remove. This will also unstick eyelids that are stuck together.     Apply Alternate heat and cold   for at least 15-20 minutes 4-5 times daily   Apply to L cheek and eye socket    Call for problems    CONTACT INFORMATION:  The main office phone number is [phone]. For emergencies after hours and on weekends, this number will convert over to our answering service and the on call provider will answer. Please try to keep non emergent phone calls/ questions to office hours 9am-5pm Monday through Friday.     [employer]  As an alternative, you can sign up and use the Epic MyChart system  for more direct and quicker access for non emergent questions/ problems.  [facility] [employer] allows you to send messages to your doctor, view your test results, renew your prescriptions, schedule appointments, and more. To sign up, go to [employer] and click on the Sign Up Now link in the New User? box. Enter your EnergyHub Activation Code exactly as it appears below along with the last four digits of your Social Security Number and your Date of Birth () to complete the sign-up process. If you do not sign up before the expiration date, you must request a new code.    EnergyHub Activation Code: [email or web address]  Expires: 8/15/2022  5:32 AM    If you have questions, you can email [email or web address] or call [phone] to talk to our EnergyHub staff. Remember, EnergyHub is NOT to be used for urgent needs. For medical emergencies, dial 911.

## 2022-08-01 NOTE — PROGRESS NOTES
Biju De Los Santos Jr, MD  [facility] EAR NOSE & THROAT  [address], [address]  [place] [address]  Fax [phone]  Phone [phone]      Visit Type: FOLLOW UP   Chief Complaint   Patient presents with   • Follow-up     6 week hospital f/u, fracture        HPI   Accompanied by: No one  She presents for a follow up evaluation. She notes bumps on lateral orbital area.  She has no problem with appearance.   Tenderness L cheek with wearing CPAP. Tendr L medial canthal area along rim orbit  She notes feeling coming back in upper cheek.    Past Medical History:   Diagnosis Date   • Anxiety    • Hypertension    • MARYBETH on CPAP        History reviewed. No pertinent surgical history.    Family History: Her family history is not on file.     Social History: She  reports that she has never smoked. She has never used smokeless tobacco. She reports previous alcohol use. She reports that she does not use drugs.    Home Medications:  Multiple Vitamins-Minerals, Nasal Spray, Zoster Vac Recomb Adjuvanted, calcium carbonate, cholecalciferol, fexofenadine, lisinopril, montelukast, multivitamin with minerals, naproxen sodium, sodium chloride, and venlafaxine XR    Allergies:  She has No Known Allergies.       Vital Signs:      ENT Physical Exam  Constitutional  Appearance: patient appears well-developed and well-nourished,  Communication/Voice: communication appropriate for developmental age; vocal quality normal;  Head and Face  Appearance: head appears normal, face appears normal and face appears atraumatic;  Salivary: glands normal;  Head and Face comments: Good facial symmetry  Mild ZF suture fullness  Tender L lateral maxilla  L lateral orbit mildly tender  Ear  Hearing: intact;  Auricles: right auricle normal; left auricle normal;  External Mastoids: right external mastoid normal; left external mastoid normal;  Nose  External Nose: nares patent bilaterally; external  nose normal;  Oral Cavity/Oropharynx  Lips: normal;  Neck  Neck: neck normal;  Respiratory  Inspection: breathing unlabored; normal breathing rate;  Cardiovascular  Inspection: extremities are warm and well perfused; no peripheral edema present;  Neurovestibular  Mental Status: alert and oriented;  Psychiatric: mood normal; affect is appropriate;         Result Review    RESULTS REVIEW    I have reviewed the patients old records in the chart.   I have reviewed the patients old records in the chart.  The following results/records were reviewed: - CT reviewed from initial consultation    Assessment & Plan    Diagnoses and all orders for this visit:    1. Closed fracture of lateral wall of left orbit with routine healing, subsequent encounter (Primary)  Comments:  Good reduction, requires no further therapy    2. MARYBETH on CPAP  Comments:  Compliant       Conservative management.     Patient appears to have good facial symmetry at this point in time.  Patient requires no further surgical intervention.  Call for problems      My Chart:  Encouraged to enroll in My Chart  Encouraged to review data and findings in My Chart    Patient understand(s) and agree(s) with the treatment plan as described.    Return if symptoms worsen or fail to improve, for Recheck face.      Biju De Los Santos Jr, MD  08/01/22  15:12 CDT   91

## 2022-08-22 ENCOUNTER — TELEPHONE (OUTPATIENT)
Dept: FAMILY MEDICINE CLINIC | Age: 67
End: 2022-08-22

## 2022-08-22 RX ORDER — METHYLPREDNISOLONE 4 MG/1
TABLET ORAL
Qty: 1 KIT | Refills: 0 | Status: SHIPPED | OUTPATIENT
Start: 2022-08-22 | End: 2022-08-28

## 2022-08-22 NOTE — TELEPHONE ENCOUNTER
Patient isn't able to access [employer] to do an E-Visit. She took a home test and tested positive for covid on Saturday night. She feels like she has a cold but she says her symptoms are mild. She has not had a fever that she knows of and no body aches. She is agreeable to steroids and otc medications. I advised her that if her symptoms worsen to call us and let her know and to go to ER to be seen.

## 2022-09-28 RX ORDER — MONTELUKAST SODIUM 10 MG/1
TABLET ORAL
Qty: 90 TABLET | Refills: 1 | Status: SHIPPED | OUTPATIENT
Start: 2022-09-28

## 2022-09-28 NOTE — TELEPHONE ENCOUNTER
Roland Bojorquez called to request a refill on her medication.       Last office visit : 6/20/2022   Next office visit : 11/10/2022     Requested Prescriptions     Pending Prescriptions Disp Refills    montelukast (SINGULAIR) 10 MG tablet [Pharmacy Med Name: MONTELUKAST 10MG TABLETS] 90 tablet 1     Sig: TAKE 1 TABLET BY MOUTH EVERY NIGHT            Owen Dodson MA

## 2022-11-10 ENCOUNTER — OFFICE VISIT (OUTPATIENT)
Dept: FAMILY MEDICINE CLINIC | Age: 67
End: 2022-11-10
Payer: MEDICARE

## 2022-11-10 VITALS
SYSTOLIC BLOOD PRESSURE: 126 MMHG | TEMPERATURE: 97 F | HEART RATE: 99 BPM | DIASTOLIC BLOOD PRESSURE: 64 MMHG | BODY MASS INDEX: 26.31 KG/M2 | WEIGHT: 168 LBS | OXYGEN SATURATION: 98 %

## 2022-11-10 DIAGNOSIS — F41.8 DEPRESSION WITH ANXIETY: ICD-10-CM

## 2022-11-10 DIAGNOSIS — E55.9 VITAMIN D DEFICIENCY: ICD-10-CM

## 2022-11-10 DIAGNOSIS — E78.2 MIXED HYPERLIPIDEMIA: ICD-10-CM

## 2022-11-10 DIAGNOSIS — R55 SYNCOPE, UNSPECIFIED SYNCOPE TYPE: ICD-10-CM

## 2022-11-10 DIAGNOSIS — I10 ESSENTIAL (PRIMARY) HYPERTENSION: Primary | ICD-10-CM

## 2022-11-10 LAB — VITAMIN D 25-HYDROXY: 60.9 NG/ML

## 2022-11-10 PROCEDURE — 3078F DIAST BP <80 MM HG: CPT | Performed by: FAMILY MEDICINE

## 2022-11-10 PROCEDURE — 3017F COLORECTAL CA SCREEN DOC REV: CPT | Performed by: FAMILY MEDICINE

## 2022-11-10 PROCEDURE — G8427 DOCREV CUR MEDS BY ELIG CLIN: HCPCS | Performed by: FAMILY MEDICINE

## 2022-11-10 PROCEDURE — 99214 OFFICE O/P EST MOD 30 MIN: CPT | Performed by: FAMILY MEDICINE

## 2022-11-10 PROCEDURE — 1123F ACP DISCUSS/DSCN MKR DOCD: CPT | Performed by: FAMILY MEDICINE

## 2022-11-10 PROCEDURE — G8417 CALC BMI ABV UP PARAM F/U: HCPCS | Performed by: FAMILY MEDICINE

## 2022-11-10 PROCEDURE — 1090F PRES/ABSN URINE INCON ASSESS: CPT | Performed by: FAMILY MEDICINE

## 2022-11-10 PROCEDURE — 3074F SYST BP LT 130 MM HG: CPT | Performed by: FAMILY MEDICINE

## 2022-11-10 PROCEDURE — G8399 PT W/DXA RESULTS DOCUMENT: HCPCS | Performed by: FAMILY MEDICINE

## 2022-11-10 PROCEDURE — G8484 FLU IMMUNIZE NO ADMIN: HCPCS | Performed by: FAMILY MEDICINE

## 2022-11-10 PROCEDURE — 1036F TOBACCO NON-USER: CPT | Performed by: FAMILY MEDICINE

## 2022-11-10 RX ORDER — LISINOPRIL 10 MG/1
TABLET ORAL
Qty: 90 TABLET | Refills: 3 | Status: SHIPPED | OUTPATIENT
Start: 2022-11-10

## 2022-11-10 NOTE — PROGRESS NOTES
Formerly [facility] PHYSICIAN SERVICES  [facility] FAMILY MEDICINE  [id number] [place] [address] Jeyson Roldan [address]  Dept: [phone]  Dept Fax: [phone]  Loc: [phone]     Sarah Menendez is a 69 y.o. female who presents today for her medical conditions/complaintsas noted below. Sarah Menendez is c/o of 6 Month Follow-Up        HPI:   Patient is here for follow up. She had injury when getting up with dog, hit side of head, had concussion in the summer. She went to ED in June for syncope, vomiting. CT head wnl. VQ scan normal. CT showed left maxillary sinus, left zygomatic arch and bony orbit fractures. She was admitted for observation. She has occasional lightheadedness. She had syncopal episode. Anxiety  Compliant with medications. No adverse effects from medication. No panic or anxiety attacks since last visit. Symptoms are controlled. Hypertension  Compliant with medications. No adverse effects from medication. No lightheadedness, palpitations, or chest pain.       Lab Results   Component Value Date    CHOL 197 07/20/2022    CHOL 161 09/28/2021    CHOL 168 03/16/2020     Lab Results   Component Value Date    TRIG 108 07/20/2022    TRIG 66 09/28/2021    TRIG 83 03/16/2020     Lab Results   Component Value Date    HDL 62 (L) 07/20/2022    HDL 69 09/28/2021    HDL 67 03/16/2020     Lab Results   Component Value Date    LDLCALC 113 07/20/2022    LDLCALC 79 09/28/2021    LDLCALC 84 03/16/2020     No results found for: LABVLDL, VLDL  No results found for: [facility]  Lab Results   Component Value Date     07/20/2022    K 5.1 (H) 07/20/2022     07/20/2022    CO2 32 (H) 07/20/2022    BUN 34 (H) 07/20/2022    CREATININE 1.0 (H) 07/20/2022    GLUCOSE 102 07/20/2022    CALCIUM 9.8 07/20/2022    PROT 7.1 07/20/2022    LABALBU 4.5 07/20/2022    BILITOT 0.5 07/20/2022    ALKPHOS 73 07/20/2022    AST 18 07/20/2022    ALT 16 07/20/2022    LABGLOM 55 (A) 07/20/2022    GFRAA >59 07/20/2022           HPI    Past Medical History:   Diagnosis Date    Anxiety     Asymptomatic postmenopausal status (age-related) (natural)     CPAP (continuous positive airway pressure) dependence     11cm    Depression     Headaches due to old head injury     Hyperlipidemia     Hypertension     Joint pain     knee and shoulder    Macular degeneration disease     beginning stage    Neoplasm of uncertain behavior of skin     Obstructive sleep apnea     AHI:  44.5 per PSG, 2/2009    Ringing in ears     Seasonal allergies     Unspecified sleep apnea     uses c-pap    Vitamin D deficiency      Past Surgical History:   Procedure Laterality Date    COLONOSCOPY  10 years    [place]-normal 10 yr recall    DILATION AND CURETTAGE OF UTERUS  2007    [place] COLONOSCOPY FLX DX W/COLLJ SPEC WHEN PFRMD N/A 8/16/2018    Dr Chace Lovett COLONOSCOPY FLX DX W/COLLJ SPEC WHEN PFRMD N/A 8/17/2018    Dr Tato Das, 5 yr recall       Family History   Problem Relation Age of Onset    Stroke Father     Hypertension Father     Other Father         tumor removal, lower abd area    Stroke Mother     Hypertension Mother     Macular Degen Mother     Other Maternal Grandmother         hodgkins    Other Maternal Grandfather         leukemia    Cancer Paternal Grandmother 80        pancreatic cancer    Heart Disease Paternal Cousin         paternal side of family    Cancer Paternal Cousin         colon    Breast Cancer Paternal Aunt        Social History     Tobacco Use    Smoking status: Never    Smokeless tobacco: Never   Substance Use Topics    Alcohol use: No      Current Outpatient Medications   Medication Sig Dispense Refill    lisinopril (PRINIVIL;ZESTRIL) 10 MG tablet TAKE 1 TABLET BY MOUTH EVERY DAY 90 tablet 3    montelukast (SINGULAIR) 10 MG tablet TAKE 1 TABLET BY MOUTH EVERY NIGHT 90 tablet 1    naproxen sodium (ANAPROX) 220 MG tablet Take 220 mg by mouth 2 times daily (with meals)      oxymetazoline (AFRIN) 0.05 % nasal spray 2 sprays by Nasal route 2 times daily      sodium chloride (OCEAN) 0.65 % nasal spray 2 sprays by Nasal route 5 times daily      venlafaxine (EFFEXOR XR) 150 MG extended release capsule TAKE 1 CAPSULE BY MOUTH DAILY 90 capsule 3    diclofenac sodium (VOLTAREN) 1 % GEL Apply 4 g topically 4 times daily 480 g 2    Multiple Vitamins-Minerals (OCUVITE ADULT 50+ PO) Take by mouth daily      fexofenadine (ALLEGRA) 180 MG tablet Take 180 mg by mouth daily      Calcium-Vitamin D (CALTRATE 600 PLUS-VIT D PO) Take  by mouth 2 times daily. therapeutic multivitamin-minerals (THERAGRAN-M) tablet Take 1 tablet by mouth daily      Blood Pressure KIT 1 Units by Does not apply route daily 1 kit 0     No current facility-administered medications for this visit. Allergies   Allergen Reactions    Latex        Health Maintenance   Topic Date Due    Diabetes screen  Never done    Shingles vaccine (2 of 3) 06/10/2013    Depression Monitoring  05/10/2023    Annual Wellness Visit (AWV)  05/11/2023    DTaP/Tdap/Td vaccine (2 - Td or Tdap) 07/15/2023    Colorectal Cancer Screen  08/17/2023    Breast cancer screen  07/29/2024    Lipids  07/20/2027    DEXA (modify frequency per FRAX score)  Completed    Flu vaccine  Completed    Pneumococcal 65+ years Vaccine  Completed    COVID-19 Vaccine  Completed    Hepatitis C screen  Completed    Hepatitis A vaccine  Aged Out    Hib vaccine  Aged Out    Meningococcal (ACWY) vaccine  Aged Out       Subjective:     Review of Systems   Constitutional:  Negative for chills and fever. HENT:  Negative for congestion. Respiratory:  Negative for cough, chest tightness and shortness of breath. Cardiovascular:  Negative for chest pain, palpitations and leg swelling. Gastrointestinal:  Negative for abdominal pain, anal bleeding, constipation, diarrhea and nausea. Genitourinary:  Negative for difficulty urinating. Neurological:  Positive for light-headedness.    Psychiatric/Behavioral: Negative. See HPI for visit specific review of symptoms. All others negative      Objective:   /64   Pulse 99   Temp 97 °F (36.1 °C)   Wt 168 lb (76.2 kg)   SpO2 98%   BMI 26.31 kg/m²    Physical Exam  Constitutional:       Appearance: She is well-developed. She is not ill-appearing. Eyes:      Pupils: Pupils are equal, round, and reactive to light. Cardiovascular:      Rate and Rhythm: Normal rate and regular rhythm. Heart sounds: No murmur heard. No friction rub. Pulmonary:      Effort: Pulmonary effort is normal. No respiratory distress. Breath sounds: Normal breath sounds. No wheezing or rales. Abdominal:      General: Bowel sounds are normal. There is no distension. Palpations: Abdomen is soft. Tenderness: There is no abdominal tenderness. There is no guarding or rebound. Musculoskeletal:      Cervical back: Normal range of motion and neck supple. Neurological:      Mental Status: She is alert. Psychiatric:         Behavior: Behavior normal.         Lab Review   Recent Results (from the past 672 hour(s))   Vitamin D 25 Hydroxy    Collection Time: 11/10/22  2:24 PM   Result Value Ref Range    Vit D, 25-Hydroxy 60.9 >=30 ng/mL               Assessment & Plan: The following diagnoses and conditions are stable withno further orders unless indicated:  Mynor Muniz was seen today for 6 month follow-up. Diagnoses and all orders for this visit:    Essential (primary) hypertension  Blood pressure is stable. Continue current medications. Monitor ambulatory bp readings, if persistently >140/90, return to clinic. Depression with anxiety  Stable, continue same. Syncope, unspecified syncope type  -     Longterm Continuous Cardiac Event Monitor; Future  Refer for zio. Hospital records reviewed. Increase po water intake. Will follow closely. Mixed hyperlipidemia  -     Comprehensive Metabolic Panel; Future  -     Lipid Panel;  Future  Lifesytle modification, will follow. Vitamin D deficiency  -     Vitamin D 25 Hydroxy; Future    Other orders  -     lisinopril (PRINIVIL;ZESTRIL) 10 MG tablet; TAKE 1 TABLET BY MOUTH EVERY DAY          Return in about 6 months (around 5/10/2023) for Annual Wellness Exam.            Discussed use, benefit, and side effects of prescribed medications. All patient questions answered. Pt voiced understanding. Reviewed health maintenance. Instructed to continue current medications, diet and exercise. Patient agreedwith treatment plan. Follow up as directed.

## 2022-11-16 ENCOUNTER — TELEPHONE (OUTPATIENT)
Dept: NEUROLOGY | Age: 67
End: 2022-11-16

## 2022-11-16 NOTE — TELEPHONE ENCOUNTER
Called and left patient a VM to let her know that she will need to take her SD card from her sleep machine and take it to [facility] for them to do a current compliance report for her appointment with RENAY Donnelly.

## 2022-11-17 NOTE — TELEPHONE ENCOUNTER
Patient requesting call back if SD card results from her sleep machine aren't in by end of day  Please advise  Thank you

## 2022-11-21 ENCOUNTER — TELEPHONE (OUTPATIENT)
Dept: FAMILY MEDICINE CLINIC | Age: 67
End: 2022-11-21

## 2022-11-21 ENCOUNTER — OFFICE VISIT (OUTPATIENT)
Dept: ENT CLINIC | Age: 67
End: 2022-11-21
Payer: MEDICARE

## 2022-11-21 DIAGNOSIS — H90.3 SENSORINEURAL HEARING LOSS (SNHL) OF BOTH EARS: Primary | ICD-10-CM

## 2022-11-21 PROCEDURE — 92552 PURE TONE AUDIOMETRY AIR: CPT | Performed by: AUDIOLOGIST

## 2022-11-21 NOTE — TELEPHONE ENCOUNTER
Critical Care follow-up note     LOS: 1 day   Patient Care Team:  Gus Izquierdo MD as PCP - General    Chief Complaint: Strokelike symptoms    Subjective     HPI:   Mary Enrique is a 75 y.o. female being transferred to [facility] from [facility] on 1/12 for management of left basal ganglia hemorrhage.     The patient is a nursing home resident with history of CVA who is bed bound, legally blind, demented, with lower extremities contractures. She presented to OSH ED for evaluation of new onset aphasia and right upper extremity paralysis noted at 1030 AM. The nursing aid reportedly noticed increased confusion last evening as well. NIH was 24 and CT imaging that displayed a left basal ganglia hemorrhage without midline shift. Labs were reportedly benign and she was placed on Cardene infusion for hypertension. Case was discussed with Dr. Canela who did not feel there was any role for surgical intervention. She will be admitted to the ICU for higher level of care.     Patient is unable to relay any history.    Of note she is a FULL CODE.     COVID-19 testing at OSH pending.     Interval history:    Patient remains contracted in bed and nonverbal.  She has some sonorous breathing.  She does follow some commands in the left upper extremity.  No fevers or chills.    History taken from: chart/staff    Past Medical History:   Diagnosis Date   • Raheem Bonnet syndrome 2002    Pt. fell out of bed, hit head, and was diagnosed with concussion. Was later diagnosed with Raheem Bonnet Syndrome by Dr. Patel here in [place]   • Diabetes (CMS/HCC)    • History of transfusion     1967, 3 units   • Hypercholesteremia    • Hypertension    • Osteoarthritis        Past Surgical History:   Procedure Laterality Date   • APPENDECTOMY     • BACK SURGERY  2006    DR ORTA   • BACK SURGERY  1996    DR ORTA   • CARPAL TUNNEL RELEASE     • CATARACT EXTRACTION     • CHOLECYSTECTOMY     • HYSTERECTOMY         Family History   Problem  Pt wants PCP to know that she has cataracts and still wants a heart monitor. "Relation Age of Onset   • Heart disease Mother    • Hypertension Mother    • Osteoarthritis Father        Social History     Socioeconomic History   • Marital status:      Spouse name: Not on file   • Number of children: Not on file   • Years of education: Not on file   • Highest education level: Not on file   Tobacco Use   • Smoking status: Never Smoker   • Smokeless tobacco: Never Used   Substance and Sexual Activity   • Alcohol use: No   • Drug use: No   • Sexual activity: Not Currently     Partners: Female     Birth control/protection: None       Allergies   Allergen Reactions   • Thorazine [Chlorpromazine] Other (See Comments)     \"has opposite effect\"        PMH/FH/SocH were reviewed by me and updates were made.     Review of Systems:    Review of 14 systems was completed with positives and pertinent negatives noted in the subjective section.  All other systems reviewed and are negative.   Exceptions are noted below:    Unable to obtain secondary to speech difficulty.    Objective     Vital Signs  Temp:  [97.5 °F (36.4 °C)-99.5 °F (37.5 °C)] 97.5 °F (36.4 °C)  Heart Rate:  [] 100  Resp:  [16-20] 16  BP: (104-168)/(48-89) 131/75  Body mass index is 19.02 kg/m².       Physical Exam:     General Appearance:   Obtunded, chronically ill-appearing, contracted, in no acute distress   Head:    Normocephalic, without obvious abnormality, atraumatic   Eyes:            Lids and lashes normal, eyes closed but opens them slightly to voice.  Conjunctivae and sclerae normal, no   icterus, no pallor, corneas clear, PERRL, left eye deviated outward.    ENMT:   Ears appear intact with no abnormalities noted      No oral lesions, no thrush, oral mucosa dry   Neck:   No adenopathy, supple, trachea midline, no thyromegaly, no   carotid bruit, no JVD       Lungs/resp:    Mildly labored, sonorous at times, symmetric chest rise, no crepitus            Heart/CV:    Regular rhythm and normal rate, normal S1 and S2, no      "       murmur   Abdomen/GI:     Normal bowel sounds, no masses, no organomegaly, soft        non-tender, non-distended   G/U:     Deferred   Extremities/MSK:   No clubbing, cyanosis or edema.  Contracted lower extremities.    Pulses:   Pulses palpable and equal bilaterally   Skin:   No bleeding, bruising or rash   Hem/Lymph:   No cervical or supraclavicular adenopathy.    Neurologic:   Contracted extremities.  Follows commands left upper extremity.              Psychiatric:  Non-agitated.   Findings are documentation my personal physical examination.  Electronically signed by:Noé Ugarte MD 01/13/21 14:23 EST    Interval: (handoff)  1a. Level of Consciousness: 2-->Not alert, requires repeated stimulation to attend, or is obtunded and requires strong or painful stimulation to make movements (not stereotyped)  1b. LOC Questions: 2-->Answers neither question correctly  1c. LOC Commands: 1-->Performs one task correctly  2. Best Gaze: 2-->Forced deviation, or total gaze paresis not overcome by the oculocephalic maneuver  3. Visual: 3-->Bilateral hemianopia (blind including cortical blindness)  4. Facial Palsy: 2-->Partial paralysis (total or near-total paralysis of lower face)  5a. Motor Arm, Left: 2-->Some effort against gravity, limb cannot get to or maintain (if cued) 90 (or 45) degrees, drifts down to bed, but has some effort against gravity  5b. Motor Arm, Right: 3-->No effort against gravity, limb falls  6a. Motor Leg, Left: 3-->No effort against gravity, leg falls to bed immediately  6b. Motor Leg, Right: 3-->No effort against gravity, leg falls to bed immediately  7. Limb Ataxia: 0-->Absent  8. Sensory: 1-->Mild-to-moderate sensory loss, patient feels pinprick is less sharp or is dull on the affected side, or there is a loss of superficial pain with pinprick, but patient is aware of being touched  9. Best Language: 3-->Mute, global aphasia, no usable speech or auditory comprehension  10. Dysarthria:  2-->Severe dysarthria, patients speech is so slurred as to be unintelligible in the absence of or out of proportion to any dysphasia, or is mute/anarthric  11. Extinction and Inattention (formerly Neglect): 1-->Visual, tactile, auditory, spatial, or personal inattention or extinction to bilateral simultaneous stimulation in one of the sensory modalities    Total (NIH Stroke Scale): 30   Results Review:     I reviewed the patient's new clinical results.   Results from last 7 days   Lab Units 01/13/21  0531 01/12/21  1520   SODIUM mmol/L  --  141   POTASSIUM mmol/L 3.8 3.0*   CHLORIDE mmol/L  --  101   CO2 mmol/L  --  25.0   BUN mg/dL  --  35*   CREATININE mg/dL  --  0.66   CALCIUM mg/dL  --  10.1   BILIRUBIN mg/dL  --  0.5   ALK PHOS U/L  --  192*   ALT (SGPT) U/L  --  91*   AST (SGOT) U/L  --  46*   GLUCOSE mg/dL  --  216*     Results from last 7 days   Lab Units 01/12/21  1520 01/12/21  1230   WBC 10*3/mm3 13.73* 7.5   HEMOGLOBIN g/dL 13.5 13.1   HEMATOCRIT % 43.9 41.4   PLATELETS 10*3/mm3 450 330   MONOCYTES % % 4.0*  --      Results from last 7 days   Lab Units 01/12/21  1721   PH, ARTERIAL pH units 7.450   PO2 ART mm Hg 80.5*   PCO2, ARTERIAL mm Hg 35.9   HCO3 ART mmol/L 24.9       I reviewed the patient's new imaging including images and reports.    Chest x-ray 1/12/2021 shows no cardiomegaly with mildly increased interstitial markings that appear chronic.  No clearly acute radiographic chest abnormality.    Medication Review:     niCARdipine, 5-15 mg/hr, Last Rate: 12.5 mg/hr (01/13/21 0228)        Assessment/Plan     Active Hospital Problems    Diagnosis   • **L basal ganglia ICH    • Dementia    • LE contractures    • Bedbound   • Nursing home resident   • H/O CVA    • T2DM    • Chronic pain   • Chronic UTI (MDR E. Coli)    • Legally blind   • Raheem Bonnet syndrome   • Hypertension   • Dyslipidemia     75 y.o. chronically debilitated female with mild dementia is a nursing home resident is transferred for  left basal ganglia ICH.  She is currently obtunded and unable to relay any history.  By report she is verbal at baseline.  She was evaluated by neurosurgery who did not feel she is a candidate for any surgical intervention.     Family is not currently present.  According to notes, Dr. Simon with palliative care spoke with the family and they are interested in pursuing hospice.  I will transfer the patient out of the intensive care unit.    Plan:    1. Hospice evaluation  2. Comfort measures  3. Neurosurgery evaluated and felt there was no intervention indicated.  4. Transfer out of ICU  5. COVID-19 testing     Electronically signed by:  Noé Ugarte MD  01/13/21  14:32 EST

## 2022-11-21 NOTE — PROGRESS NOTES
History   Ty Melgar is a 69 y.o. female who presented to the clinic this date for an annual recheck of hearing. She denied changes or problems since last exam.    Summary   Pure tone testing indicates moderate high frequency SNHL bilaterally. When compared to previous audiograms, thresholds remained essentially stable bilaterally. Results   Otoscopy:   Right: Clear EAC/Normal TM  Left: Clear EAC/Normal TM    Audiometry:   Right: Moderate high frequency SNHL  Left: Moderate high frequency SNHL         Plan   Results of today's testing were discussed with Ms. Jayson Edmond and the following recommendations were made:    Monitor hearing yearly, sooner with changes. Hearing aid evaluation as desired if hearing loss causes communication difficulties. Hearing protection as warranted.         Audiogram and Acoustic Immittance

## 2022-12-05 ASSESSMENT — ENCOUNTER SYMPTOMS
SHORTNESS OF BREATH: 0
COUGH: 0
DIARRHEA: 0
ABDOMINAL PAIN: 0
ANAL BLEEDING: 0
CONSTIPATION: 0
CHEST TIGHTNESS: 0
NAUSEA: 0

## 2022-12-12 ENCOUNTER — OFFICE VISIT (OUTPATIENT)
Dept: NEUROLOGY | Age: 67
End: 2022-12-12
Payer: MEDICARE

## 2022-12-12 VITALS
SYSTOLIC BLOOD PRESSURE: 110 MMHG | DIASTOLIC BLOOD PRESSURE: 66 MMHG | WEIGHT: 165 LBS | HEIGHT: 67 IN | OXYGEN SATURATION: 98 % | BODY MASS INDEX: 25.9 KG/M2 | HEART RATE: 87 BPM

## 2022-12-12 DIAGNOSIS — Z99.89 CPAP (CONTINUOUS POSITIVE AIRWAY PRESSURE) DEPENDENCE: ICD-10-CM

## 2022-12-12 DIAGNOSIS — G47.33 OBSTRUCTIVE SLEEP APNEA: Primary | ICD-10-CM

## 2022-12-12 PROCEDURE — 3074F SYST BP LT 130 MM HG: CPT | Performed by: PHYSICIAN ASSISTANT

## 2022-12-12 PROCEDURE — 3078F DIAST BP <80 MM HG: CPT | Performed by: PHYSICIAN ASSISTANT

## 2022-12-12 PROCEDURE — G8427 DOCREV CUR MEDS BY ELIG CLIN: HCPCS | Performed by: PHYSICIAN ASSISTANT

## 2022-12-12 PROCEDURE — 1123F ACP DISCUSS/DSCN MKR DOCD: CPT | Performed by: PHYSICIAN ASSISTANT

## 2022-12-12 PROCEDURE — 1036F TOBACCO NON-USER: CPT | Performed by: PHYSICIAN ASSISTANT

## 2022-12-12 PROCEDURE — G8417 CALC BMI ABV UP PARAM F/U: HCPCS | Performed by: PHYSICIAN ASSISTANT

## 2022-12-12 PROCEDURE — 1090F PRES/ABSN URINE INCON ASSESS: CPT | Performed by: PHYSICIAN ASSISTANT

## 2022-12-12 PROCEDURE — G8484 FLU IMMUNIZE NO ADMIN: HCPCS | Performed by: PHYSICIAN ASSISTANT

## 2022-12-12 PROCEDURE — 99213 OFFICE O/P EST LOW 20 MIN: CPT | Performed by: PHYSICIAN ASSISTANT

## 2022-12-12 PROCEDURE — 3017F COLORECTAL CA SCREEN DOC REV: CPT | Performed by: PHYSICIAN ASSISTANT

## 2022-12-12 PROCEDURE — G8399 PT W/DXA RESULTS DOCUMENT: HCPCS | Performed by: PHYSICIAN ASSISTANT

## 2022-12-12 NOTE — LETTER
[facility] Neurology and Sleep Medicine  [address] [place] [address] [address], [address] [place] [address]  Phone [phone]  Fax [phone]             Re:  Cole Bhardwaj    20  :  1955  Address: [facility] [address]       Replinishible PAP Supplies, 1 year supply  Item HPCPS Code Frequency   Mask of choice  or  1 per 3 months   Nasal Mask cushion/pillows  or  2 per 30 days   Full Face Mask Interface  1 per 30 days   Headgear  1 per 6 months   Tubing, length of choice  or  1 per 3 months   Water Chamber  1 per 6 months   Chinstrap  1 per 6 months   Disposable Filters  2 per 30 days   Reusable Filters  1 per 6 months     Diagnoses:  Obstructive sleep apnea (G47.33)  Length of Need: Lifetime, 99    Ordering Provider: Pete Reynolds PA-C  NPI:  [id number]        Signature: [unfilled]        Date: 2022      Electronically Signed by Pete Reynolds PA-C  on 2022 at 1:36 PM

## 2022-12-12 NOTE — PATIENT INSTRUCTIONS

## 2022-12-12 NOTE — PROGRESS NOTES
[id number] [facility]  [address] [place] [address] Drive, [address]  [place], [place] [address]  Phone [phone]  Fax [phone] [address] [facility] Sleep Follow Up Encounter      Information:   Patient Name: Derrick Avalos  :   1955  Age:   69 y.o. MRN:   [id number]  Account #:  [de-identified]  Today:                22    Provider:  Kymberly Laguna PA-C    Chief Complaint   Patient presents with    Sleep Apnea        Subjective:   Derrick Avalos is a 69 y.o. female  with a history of severe PRASAD who comes in for a sleep clinic follow up. The PSG, 2009 revealed an AHI of 44.5. She is prescribed CPAP at 11cm of pressure. The compliance report indicates that she is averaging 7 hours of CPAP use per day. She averages 6-7 hours of sleep per night. She reports that consistent CPAP use has alleviated the previous PRASAD symptoms. The CPAP may be out of date. It is working fine.       Location or symptom:  PRASAD  Onset:  PS2009  Timing:  q hs  Severity:  Severe  Associated:  Snoring, witnessed apneas, and excessive daytime somnolence  Alleviated:  CPAP (2nd CPAP)     Objective:     Past Medical History:   Diagnosis Date    Anxiety     Asymptomatic postmenopausal status (age-related) (natural)     CPAP (continuous positive airway pressure) dependence     11cm    Depression     Headaches due to old head injury     Hyperlipidemia     Hypertension     Joint pain     knee and shoulder    Macular degeneration disease     beginning stage    Neoplasm of uncertain behavior of skin     Obstructive sleep apnea     AHI:  44.5 per PSG, 2009    Ringing in ears     Seasonal allergies     Unspecified sleep apnea     uses c-pap    Vitamin D deficiency        Past Surgical History:   Procedure Laterality Date    COLONOSCOPY  10 years    [place]-normal 10 yr recall    DILATION AND CURETTAGE OF UTERUS  2007    CO COLONOSCOPY FLX DX W/COLLJ SPEC WHEN PFRMD N/A 2018    Dr Omalley-incomplete    CO COLONOSCOPY FLX DX W/COLLJ SPEC WHEN PFRMD N/A 8/17/2018    Dr Isaak Bunn, 5 yr recall       Recent Hospitalizations      Significant Injuries      Family History   Problem Relation Age of Onset    Stroke Father     Hypertension Father     Other Father         tumor removal, lower abd area    Stroke Mother     Hypertension Mother     Macular Degen Mother     Other Maternal Grandmother         hodgkins    Other Maternal Grandfather         leukemia    Cancer Paternal Grandmother 80        pancreatic cancer    Heart Disease Paternal Cousin         paternal side of family    Cancer Paternal Cousin         colon    Breast Cancer Paternal Aunt        Social History  Social History     Tobacco Use   Smoking Status Never   Smokeless Tobacco Never     Social History     Substance and Sexual Activity   Alcohol Use No     Social History     Substance and Sexual Activity   Drug Use No         Current Outpatient Medications   Medication Sig Dispense Refill    lisinopril (PRINIVIL;ZESTRIL) 10 MG tablet TAKE 1 TABLET BY MOUTH EVERY DAY 90 tablet 3    montelukast (SINGULAIR) 10 MG tablet TAKE 1 TABLET BY MOUTH EVERY NIGHT 90 tablet 1    naproxen sodium (ANAPROX) 220 MG tablet Take 220 mg by mouth 2 times daily (with meals)      oxymetazoline (AFRIN) 0.05 % nasal spray 2 sprays by Nasal route 2 times daily      sodium chloride (OCEAN) 0.65 % nasal spray 2 sprays by Nasal route 5 times daily      Blood Pressure KIT 1 Units by Does not apply route daily 1 kit 0    venlafaxine (EFFEXOR XR) 150 MG extended release capsule TAKE 1 CAPSULE BY MOUTH DAILY 90 capsule 3    Multiple Vitamins-Minerals (OCUVITE ADULT 50+ PO) Take by mouth daily      fexofenadine (ALLEGRA) 180 MG tablet Take 180 mg by mouth daily      Calcium-Vitamin D (CALTRATE 600 PLUS-VIT D PO) Take  by mouth 2 times daily.       therapeutic multivitamin-minerals (THERAGRAN-M) tablet Take 1 tablet by mouth daily      diclofenac sodium (VOLTAREN) 1 % GEL Apply 4 g topically 4 times daily 480 g 2     No current facility-administered medications for this visit. Allergies:  Latex    REVIEW OF SYSTEMS     Constitutional: []Fever []Sweats []Chills [] Recent Injury   [x] Denies all unless marked  HENT:[]Headache  [] Head Injury  [] Sore Throat  [] Ear Pain  [] Dizziness [] Hearing Loss   [x] Denies all unless marked  Musculoskeletal: [] Arthralgia  [] Myalgias [] Muscle cramps  [] Muscle twitches   [x] Denies all unless marked   Spine:  [] Neck pain  [] Back pain  [] Sciatica  [x] Denies all unless marked  Neurological:[] Visual Disturbance [] Double Vision [] Slurred Speech [] Trouble swallowing  [] Vertigo [] Tingling [] Numbness [] Weakness [] Loss of Balance   [] Loss of Consciousness [] Memory Loss [] Seizures  [x] Denies all unless marked  Psychiatric/Behavioral:[] Depression [] Anxiety  [x] Denies all unless marked  Sleep: []  Insomnia [] Sleep Disturbance [] Snoring [] Restless Legs [] Daytime Sleepiness [x] Sleep Apnea  [] Denies all unless marked    The MA has completed the ROS with the patient. I have reviewed it in its' entirety with the patient and agree with the documentation. PHYSICAL EXAM  /66   Pulse 87   Ht 5' 7\" (1.702 m)   Wt 165 lb (74.8 kg)   SpO2 98%   BMI 25.84 kg/m²     Constitutional -  Alert in NAD, well developed, pleasant and cooperative with exam  HEENT- Conjunctiva normal.  No scars, masses, or lesions over external nose or ears, hearing intact, no neck masses noted, no jugular vein distension, no bruit  Cardiac- Regular rate and rhythm  Pulmonary- Clear to auscultation, good expansion, normal effort without use of accessory muscles  Musculoskeletal - No significant wasting of muscles noted. No bony deformities  Extremities - No clubbing, cyanosis or edema  Skin - Warm, dry, and intact.   No rash, erythema, or pallor  Psychiatric - Mood, affect, and behavior appear normal      Neurological exam  Awake, alert, fluent oriented  appropriate affect  Attention and concentration appear appropriate  Recent and remote memory appears unremarkable  Speech normal without dysarthria  No clear issues with language of fund of knowledge    Cranial Nerve Exam     CN III, IV,VI-EOMI, No nystagmus, conjugate eye movements, no ptosis  CN VII-No facial assymetry    Motor Exam    Antigravity throughout upper and lower extremities bilaterally    Tremors and coordination    No tremors in hands or head noted     Gait    Normal base and speed  No ataxia    I reviewed the following studies:       []  :  Clinical laboratory test results     []  :  Radiology reports                    [x]  :  Review and summarization of medical records-compliance report      []     Request for medical records       []  :  Reviewed previous/recent polysomnogram report(s)      []  :  [place] Sleepiness Scale       [x]  :  Compliance report : The CPAP is set at a pressure of 11cm. Compliance download shows that she uses device: 100% of the time;  percentage of days with usage >=4 hours: 97%. AHI: 4.9 (Large leaks)-discussed    Assessment:       ICD-10-CM    1. Obstructive sleep apnea  G47.33       2. CPAP (continuous positive airway pressure) dependence  Z99.89              []  :  Stable     []  :  Improved                       [x]  :  Well controlled              []  :  Resolving     []  :  Resolved     []  :  Inadequately controlled     []  :  Worsening     []  :  Additional workup planned    Saud Conde is compliant and benefiting from therapy as indicated by compliance evaluation and patient report. Plan:     No orders of the defined types were placed in this encounter. 1.   Previously advised of the etiology,  pathophysiology, diagnosis, treatment options, and risks of untreated PRASAD. Risks may include, but are not limited to  hypertension, coronary artery disease, atrial fibrillation, CHF, diabetes, stroke, weight gain, impaired cognition, daytime somnolence, and motor vehicle accidents.  Advised to abstain from driving or operating heavy machinery when drowsy and the use of respiratory suppressants. Discussed diagnostic studies and potential treatment plan. Counseled on multimodal approach to treatment of sleep apnea to include but not limited to diet, exercise, sleep hygiene, and compliance with pap therapy. 2.  Will evaluate for PAP clinical benefit and compliance during a 30 day period within the preceding 90 days PRN. 3.  The following educational material has been included in this visit after visit summary for your review: PRASAD/PAP guidelines-Discussed with the patient and all questions fully answered. 4.  Continue PAP therapy. The patient voices understanding and recognizes the need for adherence to the prescribed therapy. Consider a repeat PSG when she needs a PAP replacement. 5.  Order-supplies-[facility]  6.   Follow up in 1 year       Replinishible PAP Supplies, 1 year supply  Item HPCPS Code Frequency   Mask of choice  or  1 per 3 months   Nasal Mask cushion/pillows  or  2 per 30 days   Full Face Mask Interface  1 per 30 days   Headgear  1 per 6 months   Tubing, length of choice  or  1 per 3 months   Water Chamber  1 per 6 months   Chinstrap  1 per 6 months   Disposable Filters  2 per 30 days   Reusable Filters  1 per 6 months     Diagnoses:  Obstructive sleep apnea (G47.33)  Length of Need: Lifetime, 99    Ordering Provider: Serena Escobedo PA-C  NPI:  [id number]

## 2023-01-23 RX ORDER — LISINOPRIL 10 MG/1
TABLET ORAL
Qty: 90 TABLET | Refills: 3 | OUTPATIENT
Start: 2023-01-23

## 2023-04-21 RX ORDER — MONTELUKAST SODIUM 10 MG/1
TABLET ORAL
Qty: 90 TABLET | Refills: 1 | Status: SHIPPED | OUTPATIENT
Start: 2023-04-21

## 2023-05-11 ENCOUNTER — OFFICE VISIT (OUTPATIENT)
Dept: PRIMARY CARE CLINIC | Age: 68
End: 2023-05-11

## 2023-05-11 VITALS
BODY MASS INDEX: 26.21 KG/M2 | WEIGHT: 167 LBS | OXYGEN SATURATION: 99 % | HEIGHT: 67 IN | SYSTOLIC BLOOD PRESSURE: 108 MMHG | HEART RATE: 90 BPM | DIASTOLIC BLOOD PRESSURE: 68 MMHG | TEMPERATURE: 97 F

## 2023-05-11 DIAGNOSIS — Z12.31 ENCOUNTER FOR SCREENING MAMMOGRAM FOR BREAST CANCER: ICD-10-CM

## 2023-05-11 DIAGNOSIS — Z12.11 ENCOUNTER FOR SCREENING COLONOSCOPY: ICD-10-CM

## 2023-05-11 DIAGNOSIS — F41.8 DEPRESSION WITH ANXIETY: ICD-10-CM

## 2023-05-11 DIAGNOSIS — Z00.00 MEDICARE ANNUAL WELLNESS VISIT, SUBSEQUENT: Primary | ICD-10-CM

## 2023-05-11 DIAGNOSIS — M85.80 OSTEOPENIA, UNSPECIFIED LOCATION: ICD-10-CM

## 2023-05-11 DIAGNOSIS — I10 ESSENTIAL (PRIMARY) HYPERTENSION: ICD-10-CM

## 2023-05-11 DIAGNOSIS — E78.2 MIXED HYPERLIPIDEMIA: ICD-10-CM

## 2023-05-11 DIAGNOSIS — T14.8XXA SKIN ABRASION: ICD-10-CM

## 2023-05-11 DIAGNOSIS — E55.9 VITAMIN D DEFICIENCY: ICD-10-CM

## 2023-05-11 RX ORDER — VENLAFAXINE HYDROCHLORIDE 150 MG/1
150 CAPSULE, EXTENDED RELEASE ORAL DAILY
Qty: 90 CAPSULE | Refills: 3 | Status: SHIPPED | OUTPATIENT
Start: 2023-05-11

## 2023-05-11 ASSESSMENT — PATIENT HEALTH QUESTIONNAIRE - PHQ9
6. FEELING BAD ABOUT YOURSELF - OR THAT YOU ARE A FAILURE OR HAVE LET YOURSELF OR YOUR FAMILY DOWN: 0
1. LITTLE INTEREST OR PLEASURE IN DOING THINGS: 0
7. TROUBLE CONCENTRATING ON THINGS, SUCH AS READING THE NEWSPAPER OR WATCHING TELEVISION: 0
3. TROUBLE FALLING OR STAYING ASLEEP: 0
SUM OF ALL RESPONSES TO PHQ QUESTIONS 1-9: 2
4. FEELING TIRED OR HAVING LITTLE ENERGY: 1
9. THOUGHTS THAT YOU WOULD BE BETTER OFF DEAD, OR OF HURTING YOURSELF: 0
8. MOVING OR SPEAKING SO SLOWLY THAT OTHER PEOPLE COULD HAVE NOTICED. OR THE OPPOSITE, BEING SO FIGETY OR RESTLESS THAT YOU HAVE BEEN MOVING AROUND A LOT MORE THAN USUAL: 0
5. POOR APPETITE OR OVEREATING: 0
2. FEELING DOWN, DEPRESSED OR HOPELESS: 1
SUM OF ALL RESPONSES TO PHQ QUESTIONS 1-9: 2
10. IF YOU CHECKED OFF ANY PROBLEMS, HOW DIFFICULT HAVE THESE PROBLEMS MADE IT FOR YOU TO DO YOUR WORK, TAKE CARE OF THINGS AT HOME, OR GET ALONG WITH OTHER PEOPLE: 1
SUM OF ALL RESPONSES TO PHQ9 QUESTIONS 1 & 2: 1

## 2023-05-11 ASSESSMENT — LIFESTYLE VARIABLES
HOW MANY STANDARD DRINKS CONTAINING ALCOHOL DO YOU HAVE ON A TYPICAL DAY: PATIENT DOES NOT DRINK
HOW OFTEN DO YOU HAVE A DRINK CONTAINING ALCOHOL: NEVER

## 2023-05-18 ASSESSMENT — ENCOUNTER SYMPTOMS
ANAL BLEEDING: 0
CHEST TIGHTNESS: 0
CONSTIPATION: 0
NAUSEA: 0
SHORTNESS OF BREATH: 0
DIARRHEA: 0
COUGH: 0
ABDOMINAL PAIN: 0

## 2023-06-22 DIAGNOSIS — F41.8 DEPRESSION WITH ANXIETY: ICD-10-CM

## 2023-06-22 RX ORDER — VENLAFAXINE HYDROCHLORIDE 150 MG/1
150 CAPSULE, EXTENDED RELEASE ORAL DAILY
Qty: 90 CAPSULE | Refills: 3 | OUTPATIENT
Start: 2023-06-22

## 2023-08-21 ENCOUNTER — ANESTHESIA EVENT (OUTPATIENT)
Dept: OPERATING ROOM | Age: 68
End: 2023-08-21

## 2023-08-22 ENCOUNTER — HOSPITAL ENCOUNTER (OUTPATIENT)
Age: 68
Setting detail: OUTPATIENT SURGERY
Discharge: HOME | End: 2023-08-22
Attending: INTERNAL MEDICINE | Admitting: INTERNAL MEDICINE
Payer: MEDICARE

## 2023-08-22 ENCOUNTER — ANESTHESIA (OUTPATIENT)
Dept: OPERATING ROOM | Age: 68
End: 2023-08-22

## 2023-08-22 ENCOUNTER — APPOINTMENT (OUTPATIENT)
Dept: OPERATING ROOM | Age: 68
End: 2023-08-22
Attending: INTERNAL MEDICINE

## 2023-08-22 VITALS
SYSTOLIC BLOOD PRESSURE: 106 MMHG | HEART RATE: 82 BPM | HEIGHT: 67 IN | WEIGHT: 163 LBS | RESPIRATION RATE: 18 BRPM | DIASTOLIC BLOOD PRESSURE: 57 MMHG | TEMPERATURE: 97.7 F | BODY MASS INDEX: 25.58 KG/M2 | OXYGEN SATURATION: 100 %

## 2023-08-22 PROCEDURE — G8907 PT DOC NO EVENTS ON DISCHARG: HCPCS

## 2023-08-22 PROCEDURE — G0105 COLORECTAL SCRN; HI RISK IND: HCPCS | Performed by: INTERNAL MEDICINE

## 2023-08-22 PROCEDURE — G0105 COLORECTAL SCRN; HI RISK IND: HCPCS

## 2023-08-22 PROCEDURE — G8918 PT W/O PREOP ORDER IV AB PRO: HCPCS

## 2023-08-22 RX ORDER — PROPOFOL 10 MG/ML
INJECTION, EMULSION INTRAVENOUS PRN
Status: DISCONTINUED | OUTPATIENT
Start: 2023-08-22 | End: 2023-08-22 | Stop reason: SDUPTHER

## 2023-08-22 RX ORDER — SODIUM CHLORIDE, SODIUM LACTATE, POTASSIUM CHLORIDE, CALCIUM CHLORIDE 600; 310; 30; 20 MG/100ML; MG/100ML; MG/100ML; MG/100ML
INJECTION, SOLUTION INTRAVENOUS CONTINUOUS
Status: DISCONTINUED | OUTPATIENT
Start: 2023-08-22 | End: 2023-08-22 | Stop reason: HOSPADM

## 2023-08-22 RX ORDER — LIDOCAINE HYDROCHLORIDE 10 MG/ML
INJECTION, SOLUTION EPIDURAL; INFILTRATION; INTRACAUDAL; PERINEURAL PRN
Status: DISCONTINUED | OUTPATIENT
Start: 2023-08-22 | End: 2023-08-22 | Stop reason: SDUPTHER

## 2023-08-22 RX ADMIN — PROPOFOL 30 MG: 10 INJECTION, EMULSION INTRAVENOUS at 08:46

## 2023-08-22 RX ADMIN — PROPOFOL 20 MG: 10 INJECTION, EMULSION INTRAVENOUS at 08:40

## 2023-08-22 RX ADMIN — PROPOFOL 40 MG: 10 INJECTION, EMULSION INTRAVENOUS at 08:38

## 2023-08-22 RX ADMIN — LIDOCAINE HYDROCHLORIDE 30 MG: 10 INJECTION, SOLUTION EPIDURAL; INFILTRATION; INTRACAUDAL; PERINEURAL at 08:32

## 2023-08-22 RX ADMIN — PROPOFOL 30 MG: 10 INJECTION, EMULSION INTRAVENOUS at 08:34

## 2023-08-22 RX ADMIN — PROPOFOL 20 MG: 10 INJECTION, EMULSION INTRAVENOUS at 08:36

## 2023-08-22 RX ADMIN — PROPOFOL 40 MG: 10 INJECTION, EMULSION INTRAVENOUS at 08:42

## 2023-08-22 RX ADMIN — PROPOFOL 70 MG: 10 INJECTION, EMULSION INTRAVENOUS at 08:32

## 2023-08-22 RX ADMIN — PROPOFOL 30 MG: 10 INJECTION, EMULSION INTRAVENOUS at 08:44

## 2023-08-22 RX ADMIN — SODIUM CHLORIDE, SODIUM LACTATE, POTASSIUM CHLORIDE, CALCIUM CHLORIDE: 600; 310; 30; 20 INJECTION, SOLUTION INTRAVENOUS at 07:44

## 2023-08-22 RX ADMIN — PROPOFOL 20 MG: 10 INJECTION, EMULSION INTRAVENOUS at 08:50

## 2023-08-22 NOTE — OP NOTE
Patient: Romeo Jay : 1955  Med Rec#: [id number] Acc#: [id number]   Primary Care Provider Yoel Whitfield MD    Date of Procedure:  2023    Endoscopist: Francia Nicholson MD, MD    Referring Provider: Yoel Whitfield MD,     Operation Performed: Colonoscopy up to the terminal ileum    Indications: History of polyps; needs colon cancer surveillance    Anesthesia:  Sedation was administered by anesthesia who monitored the patient during the procedure. I met with Romeo Jay prior to procedure. We discussed the procedure itself, and I have discussed the risks of endoscopy (including-- but not limited to-- pain, discomfort, bleeding potentially requiring second endoscopic procedure and/or blood transfusion, organ perforation requiring operative repair, damage to organs near the colon, infection, aspiration, cardiopulmonary/allergic reaction), benefits, indications to endoscopy. Additionally, we discussed options other than colonoscopy. The patient expressed understanding. All questions answered. The patient decided to proceed with the procedure. Signed informed consent was placed on the chart. Blood Loss: minimal    Withdrawal time: More than 9 minutes  Bowel Prep: Fair  with small amounts of thick solid and semisolid stool and a moderate amount of thick, opaque liquid scattered in patchy segments throughout the colon obscuring the underlying mucosa. Lesions including polyps may have been missed. Complications: no immediate complications    DESCRIPTION OF PROCEDURE:     A time out was performed. After written informed consent was obtained, the patient was placed in the left lateral position. The perianal area was inspected, and a digital rectal exam was performed. A rectal exam was performed: normal tone, no palpable lesions. At this point, a forward viewing Olympus colonoscope was inserted into the anus and carefully advanced to the terminal ileum.   The cecum was

## 2023-08-22 NOTE — DISCHARGE INSTRUCTIONS
1. Repeat colonoscopy:  -Based on her prior history of polyps, colonoscopy findings and prep quality today, in 3 years with a strict 2-day clear liquid diet and a 2-day prep using Plenvu or a similar solution along with Zofran 4 mg p.o. every 4-6 hours as needed; sooner if her  personal or family history as pertaining to colorectal cancer risk changes requiring an earlier exam or if the patient were to develop lower GI symptoms such as bleeding, abdominal pain, change in bowel habits or stool caliber or if the patient has anemia or unexplained weight loss in the future. 2.- Resume previous meds and diet  - GI clinic f/u PRN   - Keep scheduled f/u appts with other MDs     POST-OP ORDERS: COLONOSCOPY:    1. Rest today. 2. DO NOT eat or drink until wide awake; eat your usual diet today in moderate amount only. 3. DO NOT drive today. 4. Call physician if you have severe pain, vomiting, fever, rectal bleeding or black bowel movements. 5.  If a biopsy was taken or a polyp removed, you should expect to hear results in about 21 days. If you have heard nothing from your physician by then, call the office for results. 6.  Discharge home when patient awake, vitals signs stable and tolerating liquids.

## 2023-08-22 NOTE — ANESTHESIA POSTPROCEDURE EVALUATION
Department of Anesthesiology  Postprocedure Note    Patient: Ozzy Resendiz  MRN: [id number]  YOB: 1955  Date of evaluation: 8/22/2023      Procedure Summary     Date: 08/22/23 Room / Location: [facility] ENDO 02 / [address]    Anesthesia Start: 8898 Anesthesia Stop: 1968    Procedure: COLORECTAL CANCER SCREENING, NOT HIGH RISK (Abdomen) Diagnosis:       Hx of colonic polyps      (Hx of colonic polyps [Z86.010])    Surgeons: Renee Moss MD Responsible Provider: HE Munoz CRNA    Anesthesia Type: general, TIVA ASA Status: 2          Anesthesia Type: No value filed.     Meghan Phase I:      Meghan Phase II:        Anesthesia Post Evaluation    Patient location during evaluation: bedside  Patient participation: complete - patient participated  Level of consciousness: sleepy but conscious  Airway patency: patent  Nausea & Vomiting: no vomiting and no nausea  Complications: no  Cardiovascular status: hemodynamically stable  Respiratory status: acceptable and room air  Hydration status: stable  Pain management: adequate

## 2023-08-22 NOTE — ANESTHESIA PRE PROCEDURE
Department of Anesthesiology  Preprocedure Note       Name:  Sarath Old   Age:  77 y.o.  :  1955                                          MRN:  [id number]         Date:  2023      Surgeon: Ronan Elizabeth):  Arnaud Crespo MD    Procedure: Procedure(s):  COLONOSCOPY    Medications prior to admission:   Prior to Admission medications    Medication Sig Start Date End Date Taking? Authorizing Provider   venlafaxine (EFFEXOR XR) 150 MG extended release capsule Take 1 capsule by mouth daily 23   Iris Roman MD   montelukast (SINGULAIR) 10 MG tablet TAKE 1 TABLET BY MOUTH EVERY NIGHT 23   Iris Roman MD   lisinopril (PRINIVIL;ZESTRIL) 10 MG tablet TAKE 1 TABLET BY MOUTH EVERY DAY 11/10/22   Iris Roman MD   naproxen sodium (ANAPROX) 220 MG tablet Take 1 tablet by mouth 2 times daily (with meals)    Historical Provider, MD   oxymetazoline (AFRIN) 0.05 % nasal spray 2 sprays by Nasal route 2 times daily  Patient not taking: Reported on 2023    Historical Provider, MD   sodium chloride (OCEAN) 0.65 % nasal spray 2 sprays by Nasal route 5 times daily    Historical Provider, MD   Blood Pressure KIT 1 Units by Does not apply route daily 22   HE Kitchen   diclofenac sodium (VOLTAREN) 1 % GEL Apply 4 g topically 4 times daily 21  Iris Roman MD   Multiple Vitamins-Minerals (OCUVITE ADULT 50+ PO) Take by mouth daily    Historical Provider, MD   fexofenadine (ALLEGRA) 180 MG tablet Take 1 tablet by mouth daily    Historical Provider, MD   Calcium-Vitamin D (CALTRATE 600 PLUS-VIT D PO) Take  by mouth 2 times daily. Historical Provider, MD   therapeutic multivitamin-minerals (THERAGRAN-M) tablet Take 1 tablet by mouth daily    Historical Provider, MD       Current medications:    No current facility-administered medications for this visit. No current outpatient medications on file.      Facility-Administered Medications Ordered in Other Visits

## 2023-09-07 ENCOUNTER — HOSPITAL ENCOUNTER (OUTPATIENT)
Dept: WOMENS IMAGING | Age: 68
Discharge: HOME | End: 2023-09-07
Attending: FAMILY MEDICINE
Payer: MEDICARE

## 2023-09-07 DIAGNOSIS — Z12.31 ENCOUNTER FOR SCREENING MAMMOGRAM FOR BREAST CANCER: ICD-10-CM

## 2023-09-07 PROCEDURE — 77063 BREAST TOMOSYNTHESIS BI: CPT

## 2023-09-08 DIAGNOSIS — Z80.0 FAMILY HISTORY OF PANCREATIC CANCER: ICD-10-CM

## 2023-09-08 DIAGNOSIS — Z80.41 FAMILY HISTORY OF OVARIAN CANCER: ICD-10-CM

## 2023-10-19 RX ORDER — MONTELUKAST SODIUM 10 MG/1
TABLET ORAL
Qty: 90 TABLET | Refills: 1 | Status: SHIPPED | OUTPATIENT
Start: 2023-10-19

## 2023-11-09 DIAGNOSIS — E78.2 MIXED HYPERLIPIDEMIA: ICD-10-CM

## 2023-11-09 LAB
ALBUMIN SERPL-MCNC: 4.2 G/DL (ref 3.5–5.2)
ALP SERPL-CCNC: 53 U/L (ref 35–104)
ALT SERPL-CCNC: 20 U/L (ref 5–33)
ANION GAP SERPL CALCULATED.3IONS-SCNC: 12 MMOL/L (ref 7–19)
AST SERPL-CCNC: 22 U/L (ref 5–32)
BILIRUB SERPL-MCNC: 0.3 MG/DL (ref 0.2–1.2)
BUN SERPL-MCNC: 19 MG/DL (ref 8–23)
CALCIUM SERPL-MCNC: 9.5 MG/DL (ref 8.8–10.2)
CHLORIDE SERPL-SCNC: 104 MMOL/L (ref 98–111)
CHOLEST SERPL-MCNC: 175 MG/DL (ref 160–199)
CO2 SERPL-SCNC: 27 MMOL/L (ref 22–29)
CREAT SERPL-MCNC: 0.9 MG/DL (ref 0.5–0.9)
GLUCOSE SERPL-MCNC: 84 MG/DL (ref 74–109)
HDLC SERPL-MCNC: 67 MG/DL (ref 65–121)
LDLC SERPL CALC-MCNC: 94 MG/DL
POTASSIUM SERPL-SCNC: 4.9 MMOL/L (ref 3.5–5)
PROT SERPL-MCNC: 6.5 G/DL (ref 6.6–8.7)
SODIUM SERPL-SCNC: 143 MMOL/L (ref 136–145)
TRIGL SERPL-MCNC: 72 MG/DL (ref 0–149)

## 2023-11-14 ENCOUNTER — OFFICE VISIT (OUTPATIENT)
Dept: PRIMARY CARE CLINIC | Age: 68
End: 2023-11-14

## 2023-11-14 VITALS
TEMPERATURE: 96.1 F | SYSTOLIC BLOOD PRESSURE: 128 MMHG | OXYGEN SATURATION: 95 % | WEIGHT: 162 LBS | HEART RATE: 79 BPM | DIASTOLIC BLOOD PRESSURE: 76 MMHG | BODY MASS INDEX: 25.37 KG/M2

## 2023-11-14 DIAGNOSIS — E78.2 MIXED HYPERLIPIDEMIA: ICD-10-CM

## 2023-11-14 DIAGNOSIS — I10 ESSENTIAL (PRIMARY) HYPERTENSION: Primary | ICD-10-CM

## 2023-11-14 DIAGNOSIS — E55.9 VITAMIN D DEFICIENCY: ICD-10-CM

## 2023-11-14 DIAGNOSIS — M85.80 OSTEOPENIA, UNSPECIFIED LOCATION: ICD-10-CM

## 2023-11-14 DIAGNOSIS — F41.8 DEPRESSION WITH ANXIETY: ICD-10-CM

## 2023-11-14 LAB — 25(OH)D3 SERPL-MCNC: 62.2 NG/ML

## 2023-11-14 RX ORDER — LISINOPRIL 10 MG/1
TABLET ORAL
Qty: 90 TABLET | Refills: 3 | Status: SHIPPED | OUTPATIENT
Start: 2023-11-14

## 2023-11-14 NOTE — PROGRESS NOTES
Pulmonary effort is normal. No respiratory distress. Breath sounds: Normal breath sounds. No wheezing or rales. Abdominal:      General: Bowel sounds are normal. There is no distension. Palpations: Abdomen is soft. Tenderness: There is no abdominal tenderness. There is no guarding or rebound. Musculoskeletal:      Cervical back: Normal range of motion and neck supple. Neurological:      Mental Status: She is alert. Psychiatric:         Behavior: Behavior normal.           Lab Review   Recent Results (from the past 672 hour(s))   Lipid Panel    Collection Time: 11/09/23  9:54 AM   Result Value Ref Range    Cholesterol, Total 175 160 - 199 mg/dL    Triglycerides 72 0 - 149 mg/dL    HDL 67 65 - 121 mg/dL    LDL Calculated 94 <100 mg/dL   Comprehensive Metabolic Panel    Collection Time: 11/09/23  9:54 AM   Result Value Ref Range    Sodium 143 136 - 145 mmol/L    Potassium 4.9 3.5 - 5.0 mmol/L    Chloride 104 98 - 111 mmol/L    CO2 27 22 - 29 mmol/L    Anion Gap 12 7 - 19 mmol/L    Glucose 84 74 - 109 mg/dL    BUN 19 8 - 23 mg/dL    Creatinine 0.9 0.5 - 0.9 mg/dL    Est, Glom Filt Rate >60 >60    Calcium 9.5 8.8 - 10.2 mg/dL    Total Protein 6.5 (L) 6.6 - 8.7 g/dL    Albumin 4.2 3.5 - 5.2 g/dL    Total Bilirubin 0.3 0.2 - 1.2 mg/dL    Alkaline Phosphatase 53 35 - 104 U/L    ALT 20 5 - 33 U/L    AST 22 5 - 32 U/L   Vitamin D 25 Hydroxy    Collection Time: 11/14/23 12:49 PM   Result Value Ref Range    Vit D, 25-Hydroxy 62.2 >=30 ng/mL               Assessment & Plan: The following diagnoses and conditions are stable withno further orders unless indicated:  Kristie Wen was seen today for 6 month follow-up. Diagnoses and all orders for this visit:    Essential (primary) hypertension  -     Comprehensive Metabolic Panel; Future  Blood pressure is stable. Continue current medications. Monitor ambulatory bp readings, if persistently >140/90, return to clinic.      Depression with anxiety  Stable,

## 2023-12-01 ASSESSMENT — ENCOUNTER SYMPTOMS
COUGH: 0
ABDOMINAL PAIN: 0
SHORTNESS OF BREATH: 0
CONSTIPATION: 0
ANAL BLEEDING: 0
NAUSEA: 0
CHEST TIGHTNESS: 0
DIARRHEA: 0

## 2023-12-12 ENCOUNTER — OFFICE VISIT (OUTPATIENT)
Dept: NEUROLOGY | Age: 68
End: 2023-12-12
Payer: MEDICARE

## 2023-12-12 VITALS
OXYGEN SATURATION: 99 % | HEART RATE: 85 BPM | BODY MASS INDEX: 25.43 KG/M2 | DIASTOLIC BLOOD PRESSURE: 59 MMHG | HEIGHT: 67 IN | SYSTOLIC BLOOD PRESSURE: 100 MMHG | WEIGHT: 162 LBS

## 2023-12-12 DIAGNOSIS — Z99.89 CPAP (CONTINUOUS POSITIVE AIRWAY PRESSURE) DEPENDENCE: ICD-10-CM

## 2023-12-12 DIAGNOSIS — G47.33 OBSTRUCTIVE SLEEP APNEA: Primary | ICD-10-CM

## 2023-12-12 PROCEDURE — G8427 DOCREV CUR MEDS BY ELIG CLIN: HCPCS | Performed by: PHYSICIAN ASSISTANT

## 2023-12-12 PROCEDURE — 3017F COLORECTAL CA SCREEN DOC REV: CPT | Performed by: PHYSICIAN ASSISTANT

## 2023-12-12 PROCEDURE — G8399 PT W/DXA RESULTS DOCUMENT: HCPCS | Performed by: PHYSICIAN ASSISTANT

## 2023-12-12 PROCEDURE — 1036F TOBACCO NON-USER: CPT | Performed by: PHYSICIAN ASSISTANT

## 2023-12-12 PROCEDURE — 99213 OFFICE O/P EST LOW 20 MIN: CPT | Performed by: PHYSICIAN ASSISTANT

## 2023-12-12 PROCEDURE — G8484 FLU IMMUNIZE NO ADMIN: HCPCS | Performed by: PHYSICIAN ASSISTANT

## 2023-12-12 PROCEDURE — 1090F PRES/ABSN URINE INCON ASSESS: CPT | Performed by: PHYSICIAN ASSISTANT

## 2023-12-12 PROCEDURE — 3074F SYST BP LT 130 MM HG: CPT | Performed by: PHYSICIAN ASSISTANT

## 2023-12-12 PROCEDURE — G8417 CALC BMI ABV UP PARAM F/U: HCPCS | Performed by: PHYSICIAN ASSISTANT

## 2023-12-12 PROCEDURE — 3078F DIAST BP <80 MM HG: CPT | Performed by: PHYSICIAN ASSISTANT

## 2023-12-12 PROCEDURE — 1123F ACP DISCUSS/DSCN MKR DOCD: CPT | Performed by: PHYSICIAN ASSISTANT

## 2023-12-12 NOTE — PROGRESS NOTES
REVIEW OF SYSTEMS    Constitutional: []Fever []Sweats []Chills [] Recent Injury   [x] Denies all unless marked  HENT:[]Headache  [] Head Injury  [] Sore Throat  [] Ear Pain  [] Dizziness [] Hearing Loss   [x] Denies all unless marked  Musculoskeletal: [] Arthralgia  [] Myalgias [] Muscle cramps  [] Muscle twitches   [x] Denies all unless marked   Spine:  [] Neck pain  [] Back pain  [] Sciatica  [x] Denies all unless marked  Neurological:[] Visual Disturbance [] Double Vision [] Slurred Speech [] Trouble swallowing  [] Vertigo [] Tingling [] Numbness [] Weakness [] Loss of Balance   [] Loss of Consciousness [] Memory Loss [] Seizures  [x] Denies all unless marked  Psychiatric/Behavioral:[] Depression [] Anxiety  [x] Denies all unless marked  Sleep: []  Insomnia [] Sleep Disturbance [] Snoring [] Restless Legs [] Daytime Sleepiness [x] Sleep Apnea  [] Denies all unless marked
normal.  No scars, masses, or lesions over external nose or ears, hearing intact, no neck masses noted, no jugular vein distension, no bruit  Cardiac- Regular rate and rhythm  Pulmonary- Clear to auscultation, good expansion, normal effort without use of accessory muscles  Musculoskeletal - No significant wasting of muscles noted. No bony deformities  Extremities - No clubbing, cyanosis or edema  Skin - Warm, dry, and intact.   No rash, erythema, or pallor  Psychiatric - Mood, affect, and behavior appear normal      Neurological exam  Awake, alert, fluent oriented  appropriate affect  Attention and concentration appear appropriate  Recent and remote memory appears unremarkable  Speech normal without dysarthria  No clear issues with language of fund of knowledge    Cranial Nerve Exam     CN III, IV,VI-EOMI, No nystagmus, conjugate eye movements, no ptosis  CN VII-No facial assymetry    Motor Exam    Antigravity throughout upper and lower extremities bilaterally    Gait    Normal base and speed  No ataxia    I reviewed the following studies:       []  :  Clinical laboratory test results     []  :  Radiology reports                    []  :  Review and summarization of medical records     []     Request for medical records       []  :  Reviewed previous/recent polysomnogram report(s)      [x]  :  [place] Sleepiness Scale:       [place] Sleepiness Scale    Rate the likelihood of dozin = would never doze-\"never\"  1 = slight chance of dozing-\"rarely\"  2 = moderate chance of dozing-\"sometimes\"  3 = high chance of dozing-\"always\"    Situation Chance of Dozing (0-3)    Sitting and reading       1    Watching TV        2    Sitting, inactive in a public place (e.g. a theatre or a meeting) 0    As a passenger in a car for an hour without a break   1    Lying down to rest in the afternoon when circumstances permit 2    Sitting and talking to someone     0    Sitting quietly after lunch without alcohol    1    In a

## 2023-12-12 NOTE — PATIENT INSTRUCTIONS
reports, additional testing, and face-to-face  clinical evaluation subsequent to any treatment, changes in treatment, and continued treatment. Caution:  Please abstain from driving or engaging in other activities which may be hazardous in the presence of diminished alertness or daytime drowsiness. And avoid the use of sedatives or alcohol, which can worsen sleep apnea and daytime drowsiness. Mask suggestions:  -     Resmed Airfit N20 (Nasal) or F20 (Full face mask). They conform to your face, thus decreasing the potential for mask leakage. You might like the AirTouch F20(full face mask). It has a \"memory foam\" like cushion. The AirFit F30 is a smaller style full face mask designed to sit low on and cover less of your face for fewer facial marks. AirFit N30i has a top of the head tube with a nasal mask. AirFit P10 reported to be the most comfortable nasal pillow mask. Resmed Mirage FX reported to be the most comfortable nasal mask. Resmed Mirage Cogswell reported to be the most comfortable hybrid mask. AirTouch N20-memory foam nasal mask. Respironics: You might also like to try a nasal mask called a Dreamwear nasal mask or the Dreamwear nasal pillow. Another suggestion is the [employer], it is a minimal contact full face mask. The Karole Hang incredible under the nose design makes it the only full face mask that won't cause red marks on the bridge of your nose when compared to other full face masks. The Dreamwear full face mask has a  soft feel, unique in-frame air-flow, and innovative air tube connection at the top of the head for the ultimate in sleep comfort. Comfort Gel Blue. Dreamwear gel pillows. Gerber [employer] Eliud: Brevida nasal pillow mask and Simplus FFM    The use of a memory foam CPAP pillow supports the head and neck throughout the night.

## 2023-12-14 ENCOUNTER — TELEPHONE (OUTPATIENT)
Dept: NEUROLOGY | Age: 68
End: 2023-12-14

## 2023-12-15 NOTE — TELEPHONE ENCOUNTER
Please let her know that I finally received the compliance report. It looked great. Good control of the apneas but did note leaks. I think she had mentioned needing to replace supplies. Other than that, all great.

## 2024-01-31 RX ORDER — LISINOPRIL 10 MG/1
TABLET ORAL
Qty: 90 TABLET | Refills: 0 | Status: SHIPPED | OUTPATIENT
Start: 2024-01-31

## 2024-04-15 RX ORDER — MONTELUKAST SODIUM 10 MG/1
TABLET ORAL
Qty: 90 TABLET | Refills: 1 | Status: SHIPPED | OUTPATIENT
Start: 2024-04-15

## 2024-05-07 DIAGNOSIS — I10 ESSENTIAL (PRIMARY) HYPERTENSION: ICD-10-CM

## 2024-05-07 DIAGNOSIS — E78.2 MIXED HYPERLIPIDEMIA: ICD-10-CM

## 2024-05-07 LAB
ALBUMIN SERPL-MCNC: 4.3 G/DL (ref 3.5–5.2)
ALP SERPL-CCNC: 59 U/L (ref 35–104)
ALT SERPL-CCNC: 17 U/L (ref 5–33)
ANION GAP SERPL CALCULATED.3IONS-SCNC: 9 MMOL/L (ref 7–19)
AST SERPL-CCNC: 18 U/L (ref 5–32)
BILIRUB SERPL-MCNC: 0.5 MG/DL (ref 0.2–1.2)
BUN SERPL-MCNC: 35 MG/DL (ref 8–23)
CALCIUM SERPL-MCNC: 9.4 MG/DL (ref 8.8–10.2)
CHLORIDE SERPL-SCNC: 99 MMOL/L (ref 98–111)
CHOLEST SERPL-MCNC: 189 MG/DL (ref 160–199)
CO2 SERPL-SCNC: 29 MMOL/L (ref 22–29)
CREAT SERPL-MCNC: 0.9 MG/DL (ref 0.5–0.9)
GLUCOSE SERPL-MCNC: 98 MG/DL (ref 74–109)
HDLC SERPL-MCNC: 81 MG/DL (ref 65–121)
LDLC SERPL CALC-MCNC: 96 MG/DL
POTASSIUM SERPL-SCNC: 4.1 MMOL/L (ref 3.5–5)
PROT SERPL-MCNC: 6.8 G/DL (ref 6.6–8.7)
SODIUM SERPL-SCNC: 137 MMOL/L (ref 136–145)
TRIGL SERPL-MCNC: 61 MG/DL (ref 0–149)

## 2024-05-14 ENCOUNTER — OFFICE VISIT (OUTPATIENT)
Dept: PRIMARY CARE CLINIC | Age: 69
End: 2024-05-14

## 2024-05-14 VITALS
OXYGEN SATURATION: 99 % | HEART RATE: 92 BPM | WEIGHT: 156 LBS | HEIGHT: 67 IN | DIASTOLIC BLOOD PRESSURE: 72 MMHG | SYSTOLIC BLOOD PRESSURE: 122 MMHG | BODY MASS INDEX: 24.48 KG/M2

## 2024-05-14 DIAGNOSIS — J30.9 ALLERGIC RHINITIS, UNSPECIFIED SEASONALITY, UNSPECIFIED TRIGGER: ICD-10-CM

## 2024-05-14 DIAGNOSIS — F41.8 DEPRESSION WITH ANXIETY: ICD-10-CM

## 2024-05-14 DIAGNOSIS — N32.81 OVERACTIVE BLADDER: ICD-10-CM

## 2024-05-14 DIAGNOSIS — I10 ESSENTIAL (PRIMARY) HYPERTENSION: ICD-10-CM

## 2024-05-14 DIAGNOSIS — Z00.00 MEDICARE ANNUAL WELLNESS VISIT, SUBSEQUENT: Primary | ICD-10-CM

## 2024-05-14 PROBLEM — S02.40FD CLOSED FRACTURE OF LEFT ZYGOMATIC ARCH WITH ROUTINE HEALING: Status: ACTIVE | Noted: 2022-06-13

## 2024-05-14 PROBLEM — S00.10XA ECCHYMOSIS OF EYELID: Status: ACTIVE | Noted: 2022-06-13

## 2024-05-14 PROBLEM — S02.842A: Status: RESOLVED | Noted: 2022-06-13 | Resolved: 2024-05-14

## 2024-05-14 PROBLEM — S01.81XA LACERATION OF PERIORBITAL AREA: Status: ACTIVE | Noted: 2022-06-13

## 2024-05-14 PROBLEM — R55 SYNCOPE: Status: ACTIVE | Noted: 2022-06-12

## 2024-05-14 PROBLEM — S02.842A: Status: ACTIVE | Noted: 2022-06-13

## 2024-05-14 PROBLEM — R00.0 TACHYCARDIA: Status: ACTIVE | Noted: 2022-06-13

## 2024-05-14 RX ORDER — LISINOPRIL 10 MG/1
10 TABLET ORAL DAILY
Qty: 90 TABLET | Refills: 3 | Status: SHIPPED | OUTPATIENT
Start: 2024-05-14

## 2024-05-14 RX ORDER — VENLAFAXINE HYDROCHLORIDE 150 MG/1
150 CAPSULE, EXTENDED RELEASE ORAL DAILY
Qty: 90 CAPSULE | Refills: 3 | Status: SHIPPED | OUTPATIENT
Start: 2024-05-14

## 2024-05-14 RX ORDER — OMEGA-3S/DHA/EPA/FISH OIL/D3 300MG-1000
400 CAPSULE ORAL DAILY
COMMUNITY

## 2024-05-14 RX ORDER — OXYBUTYNIN CHLORIDE 5 MG/1
5 TABLET, EXTENDED RELEASE ORAL DAILY
Qty: 30 TABLET | Refills: 3 | Status: SHIPPED | OUTPATIENT
Start: 2024-05-14

## 2024-05-14 RX ORDER — PHENOL 1.4 %
600 AEROSOL, SPRAY (ML) MUCOUS MEMBRANE DAILY
COMMUNITY

## 2024-05-14 RX ORDER — MONTELUKAST SODIUM 10 MG/1
10 TABLET ORAL NIGHTLY
Qty: 90 TABLET | Refills: 3 | Status: SHIPPED | OUTPATIENT
Start: 2024-05-14

## 2024-05-14 SDOH — ECONOMIC STABILITY: FOOD INSECURITY: WITHIN THE PAST 12 MONTHS, YOU WORRIED THAT YOUR FOOD WOULD RUN OUT BEFORE YOU GOT MONEY TO BUY MORE.: NEVER TRUE

## 2024-05-14 SDOH — ECONOMIC STABILITY: INCOME INSECURITY: HOW HARD IS IT FOR YOU TO PAY FOR THE VERY BASICS LIKE FOOD, HOUSING, MEDICAL CARE, AND HEATING?: NOT HARD AT ALL

## 2024-05-14 SDOH — ECONOMIC STABILITY: HOUSING INSECURITY
IN THE LAST 12 MONTHS, WAS THERE A TIME WHEN YOU DID NOT HAVE A STEADY PLACE TO SLEEP OR SLEPT IN A SHELTER (INCLUDING NOW)?: NO

## 2024-05-14 SDOH — ECONOMIC STABILITY: FOOD INSECURITY: WITHIN THE PAST 12 MONTHS, THE FOOD YOU BOUGHT JUST DIDN'T LAST AND YOU DIDN'T HAVE MONEY TO GET MORE.: NEVER TRUE

## 2024-05-14 ASSESSMENT — PATIENT HEALTH QUESTIONNAIRE - PHQ9
SUM OF ALL RESPONSES TO PHQ QUESTIONS 1-9: 0
4. FEELING TIRED OR HAVING LITTLE ENERGY: NOT AT ALL
8. MOVING OR SPEAKING SO SLOWLY THAT OTHER PEOPLE COULD HAVE NOTICED. OR THE OPPOSITE, BEING SO FIGETY OR RESTLESS THAT YOU HAVE BEEN MOVING AROUND A LOT MORE THAN USUAL: NOT AT ALL
1. LITTLE INTEREST OR PLEASURE IN DOING THINGS: NOT AT ALL
SUM OF ALL RESPONSES TO PHQ QUESTIONS 1-9: 0
SUM OF ALL RESPONSES TO PHQ9 QUESTIONS 1 & 2: 0
6. FEELING BAD ABOUT YOURSELF - OR THAT YOU ARE A FAILURE OR HAVE LET YOURSELF OR YOUR FAMILY DOWN: NOT AT ALL
9. THOUGHTS THAT YOU WOULD BE BETTER OFF DEAD, OR OF HURTING YOURSELF: NOT AT ALL
10. IF YOU CHECKED OFF ANY PROBLEMS, HOW DIFFICULT HAVE THESE PROBLEMS MADE IT FOR YOU TO DO YOUR WORK, TAKE CARE OF THINGS AT HOME, OR GET ALONG WITH OTHER PEOPLE: NOT DIFFICULT AT ALL
3. TROUBLE FALLING OR STAYING ASLEEP: NOT AT ALL
7. TROUBLE CONCENTRATING ON THINGS, SUCH AS READING THE NEWSPAPER OR WATCHING TELEVISION: NOT AT ALL
SUM OF ALL RESPONSES TO PHQ QUESTIONS 1-9: 0
5. POOR APPETITE OR OVEREATING: NOT AT ALL
2. FEELING DOWN, DEPRESSED OR HOPELESS: NOT AT ALL
SUM OF ALL RESPONSES TO PHQ QUESTIONS 1-9: 0

## 2024-05-14 ASSESSMENT — LIFESTYLE VARIABLES
HOW OFTEN DO YOU HAVE A DRINK CONTAINING ALCOHOL: NEVER
HOW MANY STANDARD DRINKS CONTAINING ALCOHOL DO YOU HAVE ON A TYPICAL DAY: PATIENT DOES NOT DRINK

## 2024-05-14 NOTE — PATIENT INSTRUCTIONS
recommended every 6 months.  Try to get at least 150 minutes of exercise per week or 10,000 steps per day on a pedometer .  Order or download the FREE \"Exercise & Physical Activity: Your Everyday Guide\" from [employer]. Call 1-446.431.2798 or search [employer] online.  You need 6810-1781 mg of calcium and 6662-6616 IU of vitamin D per day. It is possible to meet your calcium requirement with diet alone, but a vitamin D supplement is usually necessary to meet this goal.  When exposed to the sun, use a sunscreen that protects against both UVA and UVB radiation with an SPF of 30 or greater. Reapply every 2 to 3 hours or after sweating, drying off with a towel, or swimming.  Always wear a seat belt when traveling in a car. Always wear a helmet when riding a bicycle or motorcycle.

## 2024-05-14 NOTE — PROGRESS NOTES
Medicare Annual Wellness Visit    Lynne Hinds is here for Medicare AWV and New Patient    Assessment & Plan   Medicare annual wellness visit, subsequent  Essential (primary) hypertension  -     lisinopril (PRINIVIL;ZESTRIL) 10 MG tablet; Take 1 tablet by mouth daily, Disp-90 tablet, R-3Normal  Allergic rhinitis, unspecified seasonality, unspecified trigger  -     montelukast (SINGULAIR) 10 MG tablet; Take 1 tablet by mouth nightly, Disp-90 tablet, R-3Normal  Depression with anxiety  -     venlafaxine (EFFEXOR XR) 150 MG extended release capsule; Take 1 capsule by mouth daily, Disp-90 capsule, R-3Normal  Overactive bladder  -     oxyBUTYnin (DITROPAN XL) 5 MG extended release tablet; Take 1 tablet by mouth daily, Disp-30 tablet, R-3Normal      Essenital Hypertesnion: This is a chronic, controlled problem. She currently takes Lisinopril 10 mg 1 tab daily. She has no side effects. I will continue this at the current dose.    Allergies: This is a chronic, stable problem. She is currently taking Allegra 180 mg 1 tab daily and Singulair 10 mg 1 tab daily. She feels these help to control her symptoms.     Anxiety: This is a chronic, stable problem. She is currently taking Effexor  mg 1 cap daily. 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 She had increased anxiety after they passed. She feels this works well for her. I will continue the Effexor at the current dose.     Recommendations for Preventive Services Due: see orders and patient instructions/AVS.  Recommended screening schedule for the next 5-10 years is provided to the patient in written form: see Patient Instructions/AVS.     Return in about 6 months (around 11/14/2024) for HTN, BATOOL. .     Subjective       Patient's complete Health Risk Assessment and screening values have been reviewed and are found in Flowsheets. The following problems were reviewed today and where indicated follow up appointments were made and/or referrals ordered.    Positive Risk

## 2024-06-18 DIAGNOSIS — F41.8 DEPRESSION WITH ANXIETY: ICD-10-CM

## 2024-06-20 RX ORDER — VENLAFAXINE HYDROCHLORIDE 150 MG/1
150 CAPSULE, EXTENDED RELEASE ORAL DAILY
Qty: 90 CAPSULE | Refills: 3 | Status: SHIPPED | OUTPATIENT
Start: 2024-06-20

## 2024-08-09 DIAGNOSIS — N32.81 OVERACTIVE BLADDER: ICD-10-CM

## 2024-08-09 RX ORDER — OXYBUTYNIN CHLORIDE 5 MG/1
5 TABLET, EXTENDED RELEASE ORAL DAILY
Qty: 90 TABLET | Refills: 3 | Status: SHIPPED | OUTPATIENT
Start: 2024-08-09

## 2024-10-08 DIAGNOSIS — J30.9 ALLERGIC RHINITIS, UNSPECIFIED SEASONALITY, UNSPECIFIED TRIGGER: ICD-10-CM

## 2024-10-08 RX ORDER — MONTELUKAST SODIUM 10 MG/1
10 TABLET ORAL NIGHTLY
Qty: 90 TABLET | Refills: 3 | Status: SHIPPED | OUTPATIENT
Start: 2024-10-08

## 2024-10-15 ENCOUNTER — HOSPITAL ENCOUNTER (OUTPATIENT)
Dept: WOMENS IMAGING | Age: 69
Discharge: HOME | End: 2024-10-15
Payer: MEDICARE

## 2024-10-15 DIAGNOSIS — Z12.31 ENCOUNTER FOR SCREENING MAMMOGRAM FOR MALIGNANT NEOPLASM OF BREAST: ICD-10-CM

## 2024-10-15 PROCEDURE — 77067 SCR MAMMO BI INCL CAD: CPT

## 2024-10-15 PROCEDURE — 77063 BREAST TOMOSYNTHESIS BI: CPT

## 2024-11-21 ENCOUNTER — OFFICE VISIT (OUTPATIENT)
Dept: PRIMARY CARE CLINIC | Age: 69
End: 2024-11-21

## 2024-11-21 VITALS
SYSTOLIC BLOOD PRESSURE: 118 MMHG | WEIGHT: 159.4 LBS | HEART RATE: 88 BPM | OXYGEN SATURATION: 100 % | HEIGHT: 67 IN | BODY MASS INDEX: 25.02 KG/M2 | DIASTOLIC BLOOD PRESSURE: 68 MMHG

## 2024-11-21 DIAGNOSIS — M70.61 TROCHANTERIC BURSITIS OF RIGHT HIP: ICD-10-CM

## 2024-11-21 DIAGNOSIS — F41.1 GAD (GENERALIZED ANXIETY DISORDER): Primary | ICD-10-CM

## 2024-11-21 DIAGNOSIS — R73.9 HYPERGLYCEMIA: ICD-10-CM

## 2024-11-21 DIAGNOSIS — N32.81 OVERACTIVE BLADDER: ICD-10-CM

## 2024-11-21 DIAGNOSIS — I10 ESSENTIAL (PRIMARY) HYPERTENSION: ICD-10-CM

## 2024-11-21 DIAGNOSIS — E78.2 MIXED HYPERLIPIDEMIA: ICD-10-CM

## 2024-11-21 DIAGNOSIS — J30.9 ALLERGIC RHINITIS, UNSPECIFIED SEASONALITY, UNSPECIFIED TRIGGER: ICD-10-CM

## 2024-11-21 DIAGNOSIS — H90.3 SENSORINEURAL HEARING LOSS (SNHL) OF BOTH EARS: ICD-10-CM

## 2024-11-21 RX ORDER — MONTELUKAST SODIUM 10 MG/1
10 TABLET ORAL NIGHTLY
Qty: 90 TABLET | Refills: 3 | Status: SHIPPED | OUTPATIENT
Start: 2024-11-21

## 2024-11-21 RX ORDER — PREDNISONE 20 MG/1
TABLET ORAL
Qty: 15 TABLET | Refills: 0 | Status: SHIPPED | OUTPATIENT
Start: 2024-11-21

## 2024-11-21 RX ORDER — MULTIVITAMIN WITH IRON
1 TABLET ORAL DAILY
COMMUNITY

## 2024-11-21 NOTE — PROGRESS NOTES
MG tablet Take 1 tablet by mouth nightly 90 tablet 3    predniSONE (DELTASONE) 20 MG tablet Take 2 tabs (40 mg) p.o daily for 3 days, then take 1.5 tabs (30 mg) p.o daily for 3 days, then take 1 tab (20 mg) p.o daily for 3 days, then take 0.5 tab (10 mg) p.o daily for 3 days, then stop. 15 tablet 0    oxyBUTYnin (DITROPAN-XL) 5 MG extended release tablet TAKE 1 TABLET BY MOUTH DAILY 90 tablet 3    venlafaxine (EFFEXOR XR) 150 MG extended release capsule TAKE 1 CAPSULE BY MOUTH DAILY 90 capsule 3    calcium carbonate 600 MG TABS tablet Take 1 tablet by mouth daily      vitamin D3 (CHOLECALCIFEROL) 10 MCG (400 UNIT) TABS tablet Take 1 tablet by mouth daily      lisinopril (PRINIVIL;ZESTRIL) 10 MG tablet Take 1 tablet by mouth daily 90 tablet 3    ciclopirox (PENLAC) 8 % solution APPLY TO AFFECTED TOENAILS EVERY NIGHT      naproxen sodium (ANAPROX) 220 MG tablet Take 1 tablet by mouth 2 times daily (with meals)      Blood Pressure KIT 1 Units by Does not apply route daily 1 kit 0    diclofenac sodium (VOLTAREN) 1 % GEL Apply 4 g topically 4 times daily 480 g 2    Multiple Vitamins-Minerals (OCUVITE ADULT 50+ PO) Take by mouth daily      fexofenadine (ALLEGRA) 180 MG tablet Take 1 tablet by mouth daily      Calcium-Vitamin D (CALTRATE 600 PLUS-VIT D PO) Take  by mouth 2 times daily.       No current facility-administered medications for this visit.     Allergies   Allergen Reactions    Latex Hives       Review of Systems   Constitutional:  Negative for chills, fatigue and fever.   HENT:  Negative for hearing loss and trouble swallowing.    Eyes:  Negative for visual disturbance.   Respiratory:  Negative for cough, shortness of breath and wheezing.    Cardiovascular:  Negative for chest pain and palpitations.   Gastrointestinal:  Negative for abdominal pain, constipation, diarrhea, nausea and vomiting.   Genitourinary:  Positive for frequency.   Musculoskeletal:  Positive for arthralgias.   Skin:  Negative for rash and

## 2024-11-27 ASSESSMENT — ENCOUNTER SYMPTOMS
TROUBLE SWALLOWING: 0
NAUSEA: 0
ABDOMINAL PAIN: 0
CONSTIPATION: 0
DIARRHEA: 0
WHEEZING: 0
VOMITING: 0
COUGH: 0
SHORTNESS OF BREATH: 0

## 2024-12-17 ENCOUNTER — OFFICE VISIT (OUTPATIENT)
Dept: NEUROLOGY | Age: 69
End: 2024-12-17
Payer: MEDICARE

## 2024-12-17 VITALS
HEART RATE: 88 BPM | HEIGHT: 66 IN | DIASTOLIC BLOOD PRESSURE: 54 MMHG | WEIGHT: 156.6 LBS | BODY MASS INDEX: 25.17 KG/M2 | SYSTOLIC BLOOD PRESSURE: 112 MMHG

## 2024-12-17 DIAGNOSIS — Z99.89 CPAP (CONTINUOUS POSITIVE AIRWAY PRESSURE) DEPENDENCE: ICD-10-CM

## 2024-12-17 DIAGNOSIS — G47.33 OBSTRUCTIVE SLEEP APNEA: Primary | ICD-10-CM

## 2024-12-17 PROCEDURE — 3074F SYST BP LT 130 MM HG: CPT | Performed by: PHYSICIAN ASSISTANT

## 2024-12-17 PROCEDURE — G8419 CALC BMI OUT NRM PARAM NOF/U: HCPCS | Performed by: PHYSICIAN ASSISTANT

## 2024-12-17 PROCEDURE — 3078F DIAST BP <80 MM HG: CPT | Performed by: PHYSICIAN ASSISTANT

## 2024-12-17 PROCEDURE — G8399 PT W/DXA RESULTS DOCUMENT: HCPCS | Performed by: PHYSICIAN ASSISTANT

## 2024-12-17 PROCEDURE — 1090F PRES/ABSN URINE INCON ASSESS: CPT | Performed by: PHYSICIAN ASSISTANT

## 2024-12-17 PROCEDURE — G8484 FLU IMMUNIZE NO ADMIN: HCPCS | Performed by: PHYSICIAN ASSISTANT

## 2024-12-17 PROCEDURE — 1036F TOBACCO NON-USER: CPT | Performed by: PHYSICIAN ASSISTANT

## 2024-12-17 PROCEDURE — G8428 CUR MEDS NOT DOCUMENT: HCPCS | Performed by: PHYSICIAN ASSISTANT

## 2024-12-17 PROCEDURE — 99213 OFFICE O/P EST LOW 20 MIN: CPT | Performed by: PHYSICIAN ASSISTANT

## 2024-12-17 PROCEDURE — 1123F ACP DISCUSS/DSCN MKR DOCD: CPT | Performed by: PHYSICIAN ASSISTANT

## 2024-12-17 PROCEDURE — 3017F COLORECTAL CA SCREEN DOC REV: CPT | Performed by: PHYSICIAN ASSISTANT

## 2024-12-17 NOTE — PROGRESS NOTES
REVIEW OF SYSTEMS    Constitutional: []Fever []Sweats []Chills [] Recent Injury [x] Denies all unless marked  HEENT:[]Headache  [] Head Injury [] Hearing Loss  [] Sore Throat  [] Ear Ache [x] Denies all unless marked  Spine:  [] Neck pain  [] Back pain  [] Sciaticia  [x] Denies all unless marked  Cardiovascular:[]Heart Disease []Palpitations [] Chest Pain   [x] Denies all unless marked  Pulmonary: []Shortness of Breath []Cough   [x] Denies all unless marked  Psychiatric/Behavioral:[] Depression [] Anxiety [x] Denies all unless marked  Gastrointestinal: []Nausea  []Vomiting  []Abdominal Pain  []Constipation  []Diarrhea  [x] Denies all unless marked  Genitourinary:   [] Frequency  [] Urgency  [] Dysuria [] Incontinence  [x] Denies all unless marked  Extremities: []Pain  []Swelling  [x] Denies all unless marked  Musculoskeletal: [] Myalgias  [] Joint Pain  [] Arthritis [] Muscle Cramps [] Muscle Twitches  [x] Denies all unless marked  Sleep: []Insomnia[]Snoring []Restless Legs  [x]Sleep Apnea  []Daytime Sleepiness  [x] Denies all unless marked  Skin:[] Rash [] Color Change [x] Denies all unless marked   Neurological:[]Visual Disturbance [] Memory Loss []Loss of Balance []Slurred Speech []Weakness []Seizures  [] Dizziness [x] Denies all unless marked     
Supplies, 1 year supply  Item HPCPS Code Frequency   Mask of choice  or  1 per 3 months   Nasal Mask cushion/pillows  or  2 per 30 days   Full Face Mask Interface  1 per 30 days   Headgear  1 per 6 months   Tubing, length of choice  or  1 per 3 months   Water Chamber  1 per 6 months   Chinstrap  1 per 6 months   Disposable Filters  2 per 30 days   Reusable Filters  1 per 6 months     Diagnoses:  Obstructive sleep apnea (G47.33)  Length of Need: Lifetime, 99    Ordering Provider: Christine Ugalde PA-C  NPI:  [id number]    DME Equipment HPCPS Code Setting        Auto Adjusting CPAP device with flex or comparable pressure relief per comfort  6cm to 16cm   Heated Humidifier  Patient Choice     Diagnoses:  Obstructive sleep apnea (G47.33)  Length of Need: Lifetime, 99    Ordering Provider: Christine Ugalde PA-C  NPI:  [id number]    I spent 20 minutes caring for Lynne Hinds on this date of service. This time includes time spent by me in the following activities:preparing for the visit, reviewing tests, performing a medically appropriate examination and/or evaluation , counseling and educating the patient/family/caregiver, ordering medications, tests, or procedures, documenting information in the medical record and care coordination.

## 2025-03-19 ENCOUNTER — OFFICE VISIT (OUTPATIENT)
Dept: NEUROLOGY | Age: 70
End: 2025-03-19
Payer: MEDICARE

## 2025-03-19 VITALS
HEART RATE: 86 BPM | OXYGEN SATURATION: 97 % | BODY MASS INDEX: 24.91 KG/M2 | DIASTOLIC BLOOD PRESSURE: 59 MMHG | SYSTOLIC BLOOD PRESSURE: 107 MMHG | HEIGHT: 66 IN | WEIGHT: 155 LBS

## 2025-03-19 DIAGNOSIS — Z99.89 CPAP (CONTINUOUS POSITIVE AIRWAY PRESSURE) DEPENDENCE: ICD-10-CM

## 2025-03-19 DIAGNOSIS — G47.33 OBSTRUCTIVE SLEEP APNEA: Primary | ICD-10-CM

## 2025-03-19 PROCEDURE — 1090F PRES/ABSN URINE INCON ASSESS: CPT | Performed by: PHYSICIAN ASSISTANT

## 2025-03-19 PROCEDURE — G8399 PT W/DXA RESULTS DOCUMENT: HCPCS | Performed by: PHYSICIAN ASSISTANT

## 2025-03-19 PROCEDURE — 3074F SYST BP LT 130 MM HG: CPT | Performed by: PHYSICIAN ASSISTANT

## 2025-03-19 PROCEDURE — 1123F ACP DISCUSS/DSCN MKR DOCD: CPT | Performed by: PHYSICIAN ASSISTANT

## 2025-03-19 PROCEDURE — 3078F DIAST BP <80 MM HG: CPT | Performed by: PHYSICIAN ASSISTANT

## 2025-03-19 PROCEDURE — G8427 DOCREV CUR MEDS BY ELIG CLIN: HCPCS | Performed by: PHYSICIAN ASSISTANT

## 2025-03-19 PROCEDURE — 1159F MED LIST DOCD IN RCRD: CPT | Performed by: PHYSICIAN ASSISTANT

## 2025-03-19 PROCEDURE — 99212 OFFICE O/P EST SF 10 MIN: CPT | Performed by: PHYSICIAN ASSISTANT

## 2025-03-19 PROCEDURE — 1036F TOBACCO NON-USER: CPT | Performed by: PHYSICIAN ASSISTANT

## 2025-03-19 PROCEDURE — 3017F COLORECTAL CA SCREEN DOC REV: CPT | Performed by: PHYSICIAN ASSISTANT

## 2025-03-19 PROCEDURE — G8419 CALC BMI OUT NRM PARAM NOF/U: HCPCS | Performed by: PHYSICIAN ASSISTANT

## 2025-03-19 NOTE — PATIENT INSTRUCTIONS
Patient education: Sleep apnea in adults       INTRODUCTION -- Normally during sleep, air moves through the throat and in and out of the lungs at a regular rhythm. In a person with sleep apnea, air movement is periodically diminished or stopped. There are two types of sleep apnea: obstructive sleep apnea and central sleep apnea. In obstructive sleep apnea, breathing is abnormal because of narrowing or closure of the throat. In central sleep apnea, breathing is abnormal because of a change in the breathing control and rhythm.  Sleep apnea is a serious condition that can affect a person's ability to safely perform normal daily activities and can affect long term health. Approximately 25 percent of adults are at risk for sleep apnea of some degree.  Men are more commonly affected than women. Other risk factors include middle and older age, being overweight or obese, and having a small mouth and throat.  This topic review focuses on the most common type of sleep apnea in adults, obstructive sleep apnea (PRASAD).    HOW SLEEP APNEA OCCURS -- The throat is surrounded by muscles that control the airway for speaking, swallowing, and breathing. During sleep, these muscles are less active, and this causes the throat to narrow.  In most people, this narrowing does not affect breathing. In others, it can cause snoring, sometimes with reduced or completely blocked airflow.  A completely blocked airway without airflow is called an obstructive apnea. Partial obstruction with diminished airflow is called a hypopnea. A person may have apnea and hypopnea during sleep.  Insufficient breathing due to apnea or hypopnea causes oxygen levels to fall and carbon dioxide to rise. Because the airway is blocked, breathing faster or harder does not help to improve oxygen levels until the airway is reopened. Typically, the obstruction requires the person to awaken to activate the upper airway muscles. Once the airway is opened, the person then

## 2025-03-19 NOTE — PROGRESS NOTES
REVIEW OF SYSTEMS    Constitutional: []Fever []Sweats []Chills [] Recent Injury [x] Denies all unless marked  HEENT:[]Headache  [] Head Injury [] Hearing Loss  [] Sore Throat  [] Ear Ache [x] Denies all unless marked  Spine:  [] Neck pain  [] Back pain  [] Sciaticia  [x] Denies all unless marked  Cardiovascular:[]Heart Disease []Palpitations [] Chest Pain   [x] Denies all unless marked  Pulmonary: []Shortness of Breath []Cough   [x] Denies all unless marked  Psychiatric/Behavioral:[] Depression [] Anxiety [x] Denies all unless marked  Gastrointestinal: []Nausea  []Vomiting  []Abdominal Pain  []Constipation  []Diarrhea  [x] Denies all unless marked  Genitourinary:   [] Frequency  [] Urgency  [] Dysuria [] Incontinence  [x] Denies all unless marked  Extremities: []Pain  []Swelling  [x] Denies all unless marked  Musculoskeletal: [] Myalgias  [] Joint Pain  [] Arthritis [] Muscle Cramps [] Muscle Twitches  [x] Denies all unless marked  Sleep: []Insomnia[]Snoring []Restless Legs  [x]Sleep Apnea  []Daytime Sleepiness  [x] Denies all unless marked  Skin:[] Rash [] Color Change [x] Denies all unless marked   Neurological:[]Visual Disturbance [] Memory Loss []Loss of Balance []Slurred Speech []Weakness []Seizures  [] Dizziness [x] Denies all unless marked     
6 months   Tubing, length of choice  or  1 per 3 months   Water Chamber  1 per 6 months   Chinstrap  1 per 6 months   Disposable Filters  2 per 30 days   Reusable Filters  1 per 6 months     Diagnoses:  Obstructive sleep apnea (G47.33)  Length of Need: Lifetime, 99    Ordering Provider: Christine Ugalde PA-C  NPI:  [id number]    I spent 15 minutes caring for Lynne Hinds on this date of service. This time includes time spent by me in the following activities:preparing for the visit, reviewing tests, performing a medically appropriate examination and/or evaluation , counseling and educating the patient/family/caregiver, ordering medications, tests, or procedures, documenting information in the medical record and care coordination.

## 2025-04-11 ENCOUNTER — OFFICE VISIT (OUTPATIENT)
Age: 70
End: 2025-04-11
Payer: MEDICARE

## 2025-04-11 VITALS
WEIGHT: 155 LBS | OXYGEN SATURATION: 96 % | HEIGHT: 66 IN | RESPIRATION RATE: 20 BRPM | SYSTOLIC BLOOD PRESSURE: 128 MMHG | BODY MASS INDEX: 24.91 KG/M2 | HEART RATE: 89 BPM | TEMPERATURE: 97.1 F | DIASTOLIC BLOOD PRESSURE: 58 MMHG
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
## 2025-04-11 DIAGNOSIS — S70.361A TICK BITE OF RIGHT THIGH, INITIAL ENCOUNTER: Primary | ICD-10-CM

## 2025-04-11 PROCEDURE — 1036F TOBACCO NON-USER: CPT

## 2025-04-11 PROCEDURE — G8399 PT W/DXA RESULTS DOCUMENT: HCPCS

## 2025-04-11 PROCEDURE — 1159F MED LIST DOCD IN RCRD: CPT

## 2025-04-11 PROCEDURE — G8419 CALC BMI OUT NRM PARAM NOF/U: HCPCS

## 2025-04-11 PROCEDURE — 99213 OFFICE O/P EST LOW 20 MIN: CPT

## 2025-04-11 PROCEDURE — 1090F PRES/ABSN URINE INCON ASSESS: CPT

## 2025-04-11 PROCEDURE — 3078F DIAST BP <80 MM HG: CPT

## 2025-04-11 PROCEDURE — 1123F ACP DISCUSS/DSCN MKR DOCD: CPT

## 2025-04-11 PROCEDURE — 3017F COLORECTAL CA SCREEN DOC REV: CPT

## 2025-04-11 PROCEDURE — G8427 DOCREV CUR MEDS BY ELIG CLIN: HCPCS

## 2025-04-11 PROCEDURE — 3074F SYST BP LT 130 MM HG: CPT

## 2025-04-11 RX ORDER — BENZOCAINE/MENTHOL 6 MG-10 MG
LOZENGE MUCOUS MEMBRANE
Qty: 30 G | Refills: 1 | Status: SHIPPED | OUTPATIENT
Start: 2025-04-11 | End: 2025-04-18

## 2025-04-11 RX ORDER — DOXYCYCLINE HYCLATE 100 MG
200 TABLET ORAL ONCE
Qty: 2 TABLET | Refills: 0 | Status: SHIPPED | OUTPATIENT
Start: 2025-04-11 | End: 2025-04-11

## 2025-04-11 ASSESSMENT — ENCOUNTER SYMPTOMS
SINUS PAIN: 0
DIARRHEA: 0
NAUSEA: 0
COUGH: 0
TROUBLE SWALLOWING: 0
COLOR CHANGE: 0
SHORTNESS OF BREATH: 0
CHEST TIGHTNESS: 0
EYE DISCHARGE: 0
ABDOMINAL DISTENTION: 0
SINUS PRESSURE: 0
CONSTIPATION: 0
WHEEZING: 0
RHINORRHEA: 0
ABDOMINAL PAIN: 0
EYE PAIN: 0
SORE THROAT: 0
APNEA: 0
VOMITING: 0
EYE ITCHING: 0

## 2025-04-11 NOTE — PROGRESS NOTES
if symptoms worsen/fail to improve.Any condition can change, despite proper treatment: therefore, if symptoms still persist or worsen after treatment plan intitated today, either go to the nearest ER, call PCP, or return to  for further evaluation.     Urgent Care evaluation today is not a substitute for PCP visit. Follow up care is your responsibility to discuss and review this Urgent Care visit.      Patient Instructions   Take one-time dose of doxycycline.  Apply hydrocortisone cream as prescribed.  Keep area clean and dry.   Follow-up with primary care provider or return to the clinic if signs of infection occur: increasing redness/swelling/warmth, purulent drainage.   Follow-up in the ER for systemic signs of infection, including fever.      Electronically signed by HE Lomax CNP on 4/11/2025 at 2:01 PM      EMR Dragon/translation disclaimer: Much of this encounter note is an electronic transcription/translation of spoken language to printed text. The electronic translation of spoken language may be erroneous, or at times, nonsensical words or phrases may be inadvertently transcribed.  Although I have reviewed the note for such errors, some may still exist.

## 2025-04-11 NOTE — PATIENT INSTRUCTIONS
Take one-time dose of doxycycline.  Apply hydrocortisone cream as prescribed.  Keep area clean and dry.   Follow-up with primary care provider or return to the clinic if signs of infection occur: increasing redness/swelling/warmth, purulent drainage.   Follow-up in the ER for systemic signs of infection, including fever.

## 2025-05-02 DIAGNOSIS — I10 ESSENTIAL (PRIMARY) HYPERTENSION: ICD-10-CM

## 2025-05-06 RX ORDER — LISINOPRIL 10 MG/1
10 TABLET ORAL DAILY
Qty: 90 TABLET | Refills: 3 | Status: SHIPPED | OUTPATIENT
Start: 2025-05-06

## 2025-05-06 NOTE — TELEPHONE ENCOUNTER
Lynne Hinds called to request a refill on her medication.      Last office visit : 11/21/2024   Next office visit : 5/22/2025     Requested Prescriptions     Signed Prescriptions Disp Refills    lisinopril (PRINIVIL;ZESTRIL) 10 MG tablet 90 tablet 3     Sig: TAKE 1 TABLET BY MOUTH DAILY     Authorizing Provider: BUCK GRIMES     Ordering User: RAZA WARE LPN

## 2025-05-14 DIAGNOSIS — E78.2 MIXED HYPERLIPIDEMIA: ICD-10-CM

## 2025-05-14 DIAGNOSIS — I10 ESSENTIAL (PRIMARY) HYPERTENSION: ICD-10-CM

## 2025-05-14 DIAGNOSIS — R73.9 HYPERGLYCEMIA: ICD-10-CM

## 2025-05-14 LAB
ALBUMIN SERPL-MCNC: 4.3 G/DL (ref 3.5–5.2)
ALP SERPL-CCNC: 64 U/L (ref 35–104)
ALT SERPL-CCNC: 22 U/L (ref 10–35)
ANION GAP SERPL CALCULATED.3IONS-SCNC: 10 MMOL/L (ref 8–16)
AST SERPL-CCNC: 21 U/L (ref 10–35)
BILIRUB DIRECT SERPL-MCNC: 0.2 MG/DL (ref 0–0.3)
BILIRUB INDIRECT SERPL-MCNC: 0.2 MG/DL (ref 0–1)
BILIRUB SERPL-MCNC: 0.4 MG/DL (ref 0.2–1.2)
BUN SERPL-MCNC: 41 MG/DL (ref 8–23)
CALCIUM SERPL-MCNC: 9.8 MG/DL (ref 8.8–10.2)
CHLORIDE SERPL-SCNC: 104 MMOL/L (ref 98–107)
CHOLEST SERPL-MCNC: 186 MG/DL (ref 0–199)
CO2 SERPL-SCNC: 29 MMOL/L (ref 22–29)
CREAT SERPL-MCNC: 0.9 MG/DL (ref 0.5–0.9)
GLUCOSE SERPL-MCNC: 95 MG/DL (ref 70–99)
HBA1C MFR BLD: 5.6 % (ref 4–5.6)
HDLC SERPL-MCNC: 69 MG/DL (ref 40–60)
LDLC SERPL CALC-MCNC: 103 MG/DL
POTASSIUM SERPL-SCNC: 4.4 MMOL/L (ref 3.5–5.1)
PROT SERPL-MCNC: 6.6 G/DL (ref 6.4–8.3)
SODIUM SERPL-SCNC: 143 MMOL/L (ref 136–145)
TRIGL SERPL-MCNC: 68 MG/DL (ref 0–149)
TSH SERPL DL<=0.005 MIU/L-ACNC: 1.11 UIU/ML (ref 0.27–4.2)

## 2025-05-20 ENCOUNTER — RESULTS FOLLOW-UP (OUTPATIENT)
Dept: PRIMARY CARE CLINIC | Age: 70
End: 2025-05-20

## 2025-05-28 ENCOUNTER — OFFICE VISIT (OUTPATIENT)
Dept: PRIMARY CARE CLINIC | Age: 70
End: 2025-05-28
Payer: MEDICARE

## 2025-05-28 VITALS
WEIGHT: 152.4 LBS | HEIGHT: 66 IN | TEMPERATURE: 98.2 F | OXYGEN SATURATION: 100 % | SYSTOLIC BLOOD PRESSURE: 120 MMHG | BODY MASS INDEX: 24.49 KG/M2 | DIASTOLIC BLOOD PRESSURE: 68 MMHG | HEART RATE: 80 BPM

## 2025-05-28 DIAGNOSIS — E78.2 MIXED HYPERLIPIDEMIA: ICD-10-CM

## 2025-05-28 DIAGNOSIS — F41.1 GAD (GENERALIZED ANXIETY DISORDER): ICD-10-CM

## 2025-05-28 DIAGNOSIS — N32.81 OVERACTIVE BLADDER: ICD-10-CM

## 2025-05-28 DIAGNOSIS — I10 ESSENTIAL (PRIMARY) HYPERTENSION: Primary | ICD-10-CM

## 2025-05-28 PROCEDURE — G8420 CALC BMI NORM PARAMETERS: HCPCS | Performed by: NURSE PRACTITIONER

## 2025-05-28 PROCEDURE — G8427 DOCREV CUR MEDS BY ELIG CLIN: HCPCS | Performed by: NURSE PRACTITIONER

## 2025-05-28 PROCEDURE — 3074F SYST BP LT 130 MM HG: CPT | Performed by: NURSE PRACTITIONER

## 2025-05-28 PROCEDURE — 3078F DIAST BP <80 MM HG: CPT | Performed by: NURSE PRACTITIONER

## 2025-05-28 PROCEDURE — 1160F RVW MEDS BY RX/DR IN RCRD: CPT | Performed by: NURSE PRACTITIONER

## 2025-05-28 PROCEDURE — 99214 OFFICE O/P EST MOD 30 MIN: CPT | Performed by: NURSE PRACTITIONER

## 2025-05-28 PROCEDURE — 1090F PRES/ABSN URINE INCON ASSESS: CPT | Performed by: NURSE PRACTITIONER

## 2025-05-28 PROCEDURE — 1123F ACP DISCUSS/DSCN MKR DOCD: CPT | Performed by: NURSE PRACTITIONER

## 2025-05-28 PROCEDURE — 1159F MED LIST DOCD IN RCRD: CPT | Performed by: NURSE PRACTITIONER

## 2025-05-28 PROCEDURE — 1036F TOBACCO NON-USER: CPT | Performed by: NURSE PRACTITIONER

## 2025-05-28 PROCEDURE — 3017F COLORECTAL CA SCREEN DOC REV: CPT | Performed by: NURSE PRACTITIONER

## 2025-05-28 PROCEDURE — G8399 PT W/DXA RESULTS DOCUMENT: HCPCS | Performed by: NURSE PRACTITIONER

## 2025-05-28 RX ORDER — MV-MN/C/THEANINE/HERB NO.310 1000-200MG
100 POWDER IN PACKET (EA) ORAL
COMMUNITY

## 2025-05-28 SDOH — ECONOMIC STABILITY: FOOD INSECURITY: WITHIN THE PAST 12 MONTHS, THE FOOD YOU BOUGHT JUST DIDN'T LAST AND YOU DIDN'T HAVE MONEY TO GET MORE.: PATIENT DECLINED

## 2025-05-28 SDOH — ECONOMIC STABILITY: FOOD INSECURITY: WITHIN THE PAST 12 MONTHS, YOU WORRIED THAT YOUR FOOD WOULD RUN OUT BEFORE YOU GOT MONEY TO BUY MORE.: PATIENT DECLINED

## 2025-05-28 ASSESSMENT — ENCOUNTER SYMPTOMS
SHORTNESS OF BREATH: 0
NAUSEA: 0
VOMITING: 0
COLOR CHANGE: 0
VOICE CHANGE: 0
COUGH: 0
RHINORRHEA: 0
PHOTOPHOBIA: 0
BACK PAIN: 0

## 2025-05-28 ASSESSMENT — PATIENT HEALTH QUESTIONNAIRE - PHQ9
SUM OF ALL RESPONSES TO PHQ QUESTIONS 1-9: 0
8. MOVING OR SPEAKING SO SLOWLY THAT OTHER PEOPLE COULD HAVE NOTICED. OR THE OPPOSITE, BEING SO FIGETY OR RESTLESS THAT YOU HAVE BEEN MOVING AROUND A LOT MORE THAN USUAL: NOT AT ALL
SUM OF ALL RESPONSES TO PHQ QUESTIONS 1-9: 0
7. TROUBLE CONCENTRATING ON THINGS, SUCH AS READING THE NEWSPAPER OR WATCHING TELEVISION: NOT AT ALL
5. POOR APPETITE OR OVEREATING: NOT AT ALL
1. LITTLE INTEREST OR PLEASURE IN DOING THINGS: NOT AT ALL
2. FEELING DOWN, DEPRESSED OR HOPELESS: NOT AT ALL
9. THOUGHTS THAT YOU WOULD BE BETTER OFF DEAD, OR OF HURTING YOURSELF: NOT AT ALL
10. IF YOU CHECKED OFF ANY PROBLEMS, HOW DIFFICULT HAVE THESE PROBLEMS MADE IT FOR YOU TO DO YOUR WORK, TAKE CARE OF THINGS AT HOME, OR GET ALONG WITH OTHER PEOPLE: NOT DIFFICULT AT ALL
4. FEELING TIRED OR HAVING LITTLE ENERGY: NOT AT ALL
6. FEELING BAD ABOUT YOURSELF - OR THAT YOU ARE A FAILURE OR HAVE LET YOURSELF OR YOUR FAMILY DOWN: NOT AT ALL
3. TROUBLE FALLING OR STAYING ASLEEP: NOT AT ALL

## 2025-05-28 NOTE — PROGRESS NOTES
BP regularly.    2. Anxiety: Stable.  - Continue current management and monitor mental health.    3. Sleep Apnea: Compliant.  - Continue current management and monitor compliance.    4. Health Maintenance.  - Labs in 6 months, prior to next visit.  - Today's labs normal.    Follow-up  - Due for Medicare annual wellness visit next appointment.  - Follow-up in 6 months for routine evaluation and wellness visit.      Diagnosis Orders   1. Essential (primary) hypertension  CBC with Auto Differential    Comprehensive Metabolic Panel    Hemoglobin A1C    Lipid Panel    TSH      2. Mixed hyperlipidemia  CBC with Auto Differential    Comprehensive Metabolic Panel    Hemoglobin A1C    Lipid Panel    TSH      3. Overactive bladder  CBC with Auto Differential    Comprehensive Metabolic Panel    Hemoglobin A1C    Lipid Panel    TSH      4. BATOOL (generalized anxiety disorder)  CBC with Auto Differential    Comprehensive Metabolic Panel    Hemoglobin A1C    Lipid Panel    TSH            Plan:   Total time spent today caring for this patient was 30 minutes    PDMP Monitoring:    Last PDMP [place] as Reviewed:  Review User Review Instant Review Result            Urine Drug Screenings (1 yr)    No resulted procedures found.       Medication Contract and Consent for Opioid Use Documents Filed        No documents found                     Patient given educational materials -see patient instructions.  Discussed use, benefit, and side effects of prescribed medications.  All patient questions answered.  Pt voiced understanding. Reviewed health maintenance.  Instructed to continue currentmedications, diet and exercise.  Patient agreed with treatment plan. Follow up as directed.   MEDICATIONS:  No orders of the defined types were placed in this encounter.        ORDERS:  Orders Placed This Encounter   Procedures    CBC with Auto Differential    Comprehensive Metabolic Panel    Hemoglobin A1C    Lipid Panel    TSH       Follow-up:  Return in

## 2025-07-05 ENCOUNTER — OFFICE VISIT (OUTPATIENT)
Age: 70
End: 2025-07-05

## 2025-07-05 VITALS
WEIGHT: 158.2 LBS | BODY MASS INDEX: 25.53 KG/M2 | SYSTOLIC BLOOD PRESSURE: 128 MMHG | OXYGEN SATURATION: 100 % | DIASTOLIC BLOOD PRESSURE: 58 MMHG | TEMPERATURE: 98.1 F | RESPIRATION RATE: 20 BRPM | HEART RATE: 98 BPM

## 2025-07-05 DIAGNOSIS — L23.7 CONTACT DERMATITIS DUE TO POISON IVY: Primary | ICD-10-CM

## 2025-07-05 RX ORDER — TRIAMCINOLONE ACETONIDE 1 MG/G
CREAM TOPICAL
Qty: 15 G | Refills: 0 | Status: SHIPPED | OUTPATIENT
Start: 2025-07-05

## 2025-07-05 RX ORDER — DEXAMETHASONE SODIUM PHOSPHATE 10 MG/ML
10 INJECTION, SOLUTION INTRAMUSCULAR; INTRAVENOUS ONCE
Status: COMPLETED | OUTPATIENT
Start: 2025-07-05 | End: 2025-07-05

## 2025-07-05 RX ORDER — METHYLPREDNISOLONE 4 MG/1
TABLET ORAL
Qty: 1 KIT | Refills: 0 | Status: SHIPPED | OUTPATIENT
Start: 2025-07-05 | End: 2025-07-11

## 2025-07-05 RX ADMIN — DEXAMETHASONE SODIUM PHOSPHATE 10 MG: 10 INJECTION, SOLUTION INTRAMUSCULAR; INTRAVENOUS at 14:06

## 2025-07-05 ASSESSMENT — ENCOUNTER SYMPTOMS
COLOR CHANGE: 0
EYE ITCHING: 0
EYE DISCHARGE: 0
EYE PAIN: 0
SINUS PRESSURE: 0
WHEEZING: 0
APNEA: 0
CHEST TIGHTNESS: 0
DIARRHEA: 0
COUGH: 0
TROUBLE SWALLOWING: 0
ABDOMINAL PAIN: 0
SINUS PAIN: 0
RHINORRHEA: 0
ABDOMINAL DISTENTION: 0
NAUSEA: 0
VOMITING: 0
SORE THROAT: 0
CONSTIPATION: 0
SHORTNESS OF BREATH: 0

## 2025-07-05 NOTE — PROGRESS NOTES
[facility], [facility] ([place])  [facility] [place] [facility]  [address]  [place] [address]  Dept: [phone]  Dept Fax: [phone]    Lynne Hinds is a 70 y.o. female who presents today for her medical conditions/complaints as noted below.  Lynne Hinds is c/o of Poison Ivy (Pateint stated that symptoms started 1 week ago. Patient had poison Ivy on right arm, left side of face and stated that it is spreading. )        HPI:     Lynne Hinds presents with complaints of a rash. Patient states she first noticed the rash x 1 week ago after she had been working outside. She has been taking benadryl and apply calamine lotion, but the rash hasn't gotten any better, it is actually getting worse. It is present on both of her arms, the left side of her face, and mildly on her stomach. She denies any other symptoms. She is stable at this time.     Last antibiotic administered was doxycycline on 4/11/25. No reported recent steroid.      Past Medical History:   Diagnosis Date    Anxiety     Asymptomatic postmenopausal status (age-related) (natural)     CPAP (continuous positive airway pressure) dependence     11cm    Depression     Headaches due to old head injury     Hyperlipidemia     Hypertension     Joint pain     knee and shoulder    Macular degeneration disease     beginning stage    Neoplasm of uncertain behavior of skin     Obstructive sleep apnea     AHI:  44.5 per [facility], 2/2009    Ringing in ears     Seasonal allergies     Unspecified sleep apnea     uses c-pap    Vitamin D deficiency      Past Surgical History:   Procedure Laterality Date    CATARACT EXTRACTION, BILATERAL      COLONOSCOPY  10 years    [place]-normal 10 yr recall    COLONOSCOPY N/A 08/22/2023    Dr Rivas, Mild diverticulosis left colon, int hem Gr 1 wo bleeding, 3 year recall    DILATION AND CURETTAGE OF UTERUS  2007    EYE SURGERY Bilateral 05/25/2023    MOLE REMOVAL Left 08/16/2024    Breast    AR COLONOSCOPY

## 2025-07-05 NOTE — PATIENT INSTRUCTIONS
Rash    Dexamethasone injection today; risks/benefits discussed in office, patient gave informed consent.     Medrol Dose Pack sent to pharmacy; take as prescribed starting tomorrow.     Triamcinolone topical prescribed; apply as directed.     Continue zyrtec and benadryl as needed.    If you have difficulty breathing or a sensation of your throat swelling, go to the ER.     The patient is to follow up with PCP or return to clinic if symptoms worsen/fail to improve.

## (undated) DEVICE — TRAP POLYP ETRAP

## (undated) DEVICE — CANNULA NSL AD L7FT DIV O2 CO2 W/ M LUERLOCK TRMPT CONN

## (undated) DEVICE — SINGLE PORT MANIFOLD: Brand: NEPTUNE 2

## (undated) DEVICE — ENDO KIT,LOURDES HOSPITAL: Brand: MEDLINE INDUSTRIES, INC.

## (undated) DEVICE — ADAPTER CLEANING PORPOISE CLEANING

## (undated) DEVICE — BRUSH ENDOSCP 2 END CHN HEDGEHOG

## (undated) DEVICE — SPONGE ENDOSCP CLN BS INF PREVENTION KOALA